# Patient Record
Sex: FEMALE | Race: BLACK OR AFRICAN AMERICAN | NOT HISPANIC OR LATINO | Employment: UNEMPLOYED | URBAN - METROPOLITAN AREA
[De-identification: names, ages, dates, MRNs, and addresses within clinical notes are randomized per-mention and may not be internally consistent; named-entity substitution may affect disease eponyms.]

---

## 2017-11-07 DIAGNOSIS — M25.561 RIGHT KNEE PAIN: ICD-10-CM

## 2017-11-07 DIAGNOSIS — M79.661 PAIN OF RIGHT LOWER LEG: Primary | ICD-10-CM

## 2017-11-13 ENCOUNTER — HOSPITAL ENCOUNTER (OUTPATIENT)
Dept: RADIOLOGY | Facility: HOSPITAL | Age: 72
Discharge: HOME | End: 2017-11-13
Attending: FAMILY MEDICINE
Payer: MEDICARE

## 2017-11-13 DIAGNOSIS — M79.661 PAIN OF RIGHT LOWER LEG: ICD-10-CM

## 2017-11-13 DIAGNOSIS — M25.561 RIGHT KNEE PAIN: ICD-10-CM

## 2017-11-13 PROCEDURE — 93971 EXTREMITY STUDY: CPT | Mod: 26,,, | Performed by: RADIOLOGY

## 2017-11-13 PROCEDURE — 93971 EXTREMITY STUDY: CPT | Mod: TC

## 2019-11-26 ENCOUNTER — TELEPHONE (OUTPATIENT)
Dept: ORTHOPEDICS | Facility: CLINIC | Age: 74
End: 2019-11-26

## 2019-11-26 NOTE — TELEPHONE ENCOUNTER
Spoke with pt    Rescheduled her appointment with Dr Lin to sports medicine.  Pt with right shoulder pain.  Pt will bring outside MRI.  Pt requesting MD only    
28-Oct-2018 09:12

## 2019-12-03 ENCOUNTER — HOSPITAL ENCOUNTER (OUTPATIENT)
Dept: RADIOLOGY | Facility: HOSPITAL | Age: 74
Discharge: HOME | End: 2019-12-03
Attending: ORTHOPAEDIC SURGERY
Payer: MEDICARE

## 2019-12-03 ENCOUNTER — OFFICE VISIT (OUTPATIENT)
Dept: SPORTS MEDICINE | Facility: CLINIC | Age: 74
End: 2019-12-03
Payer: MEDICARE

## 2019-12-03 VITALS
HEIGHT: 66 IN | BODY MASS INDEX: 22.5 KG/M2 | HEART RATE: 69 BPM | SYSTOLIC BLOOD PRESSURE: 128 MMHG | WEIGHT: 140 LBS | DIASTOLIC BLOOD PRESSURE: 83 MMHG

## 2019-12-03 DIAGNOSIS — M25.511 RIGHT SHOULDER PAIN, UNSPECIFIED CHRONICITY: ICD-10-CM

## 2019-12-03 DIAGNOSIS — M19.011 PRIMARY OSTEOARTHRITIS OF RIGHT SHOULDER: Primary | ICD-10-CM

## 2019-12-03 PROCEDURE — 99999 PR PBB SHADOW E&M-EST. PATIENT-LVL III: ICD-10-PCS | Mod: PBBFAC,,, | Performed by: ORTHOPAEDIC SURGERY

## 2019-12-03 PROCEDURE — 1101F PT FALLS ASSESS-DOCD LE1/YR: CPT | Mod: CPTII,S$GLB,, | Performed by: ORTHOPAEDIC SURGERY

## 2019-12-03 PROCEDURE — 1159F PR MEDICATION LIST DOCUMENTED IN MEDICAL RECORD: ICD-10-PCS | Mod: S$GLB,,, | Performed by: ORTHOPAEDIC SURGERY

## 2019-12-03 PROCEDURE — 99999 PR PBB SHADOW E&M-EST. PATIENT-LVL III: CPT | Mod: PBBFAC,,, | Performed by: ORTHOPAEDIC SURGERY

## 2019-12-03 PROCEDURE — 99203 PR OFFICE/OUTPT VISIT, NEW, LEVL III, 30-44 MIN: ICD-10-PCS | Mod: S$GLB,,, | Performed by: ORTHOPAEDIC SURGERY

## 2019-12-03 PROCEDURE — 1126F AMNT PAIN NOTED NONE PRSNT: CPT | Mod: S$GLB,,, | Performed by: ORTHOPAEDIC SURGERY

## 2019-12-03 PROCEDURE — 1101F PR PT FALLS ASSESS DOC 0-1 FALLS W/OUT INJ PAST YR: ICD-10-PCS | Mod: CPTII,S$GLB,, | Performed by: ORTHOPAEDIC SURGERY

## 2019-12-03 PROCEDURE — 1126F PR PAIN SEVERITY QUANTIFIED, NO PAIN PRESENT: ICD-10-PCS | Mod: S$GLB,,, | Performed by: ORTHOPAEDIC SURGERY

## 2019-12-03 PROCEDURE — 73030 X-RAY EXAM OF SHOULDER: CPT | Mod: TC,RT

## 2019-12-03 PROCEDURE — 1159F MED LIST DOCD IN RCRD: CPT | Mod: S$GLB,,, | Performed by: ORTHOPAEDIC SURGERY

## 2019-12-03 PROCEDURE — 73030 XR SHOULDER COMPLETE 2 OR MORE VIEWS RIGHT: ICD-10-PCS | Mod: 26,RT,, | Performed by: RADIOLOGY

## 2019-12-03 PROCEDURE — 99203 OFFICE O/P NEW LOW 30 MIN: CPT | Mod: S$GLB,,, | Performed by: ORTHOPAEDIC SURGERY

## 2019-12-03 PROCEDURE — 73030 X-RAY EXAM OF SHOULDER: CPT | Mod: 26,RT,, | Performed by: RADIOLOGY

## 2019-12-03 RX ORDER — AMLODIPINE BESYLATE 10 MG/1
10 TABLET ORAL NIGHTLY
Refills: 1 | COMMUNITY
Start: 2019-10-29

## 2019-12-03 RX ORDER — GABAPENTIN 300 MG/1
300 CAPSULE ORAL NIGHTLY PRN
Refills: 0 | COMMUNITY
Start: 2019-11-14

## 2019-12-03 RX ORDER — ACETAMINOPHEN AND CODEINE PHOSPHATE 300; 30 MG/1; MG/1
1 TABLET ORAL 2 TIMES DAILY PRN
Refills: 0 | COMMUNITY
Start: 2019-11-14 | End: 2022-12-05

## 2019-12-03 NOTE — LETTER
December 3, 2019      Raymond Delvalle MD  [address]  [place]  [facility] [address]           [facility]  [address]  [facility] [address]  Phone: [phone]          Patient: Elena Willingham   MR Number: [id number]   YOB: 1945   Date of Visit: 12/3/2019       Dear Dr. Raymond Delvalle:    Thank you for referring Elena Willingham to me for evaluation. Attached you will find relevant portions of my assessment and plan of care.    If you have questions, please do not hesitate to call me. I look forward to following Elena Willingham along with you.    Sincerely,    W Luca Woodson MD    Enclosure  CC:  No Recipients    If you would like to receive this communication electronically, please contact [email or web address] or [phone] to request more information on [employer] Link access.    For providers and/or their staff who would like to refer a patient to [facility], please contact us through our one-stop-shop provider referral line, [facility], at [phone].    If you feel you have received this communication in error or would no longer like to receive these types of communications, please e-mail [email or web address]

## 2019-12-03 NOTE — PROGRESS NOTES
"CC: Right shoulder pain     74 y.o. Female presents as a new patient to me. She is retired. RHD. Complaint is right shoulder pain for almost a year. Atraumatic onset. Pain localizes over the shoulder and radiates down the arm to the hand. Worse with activity and motion. Pain is disruptive to sleep at night. Better with rest. Denies neck pain. Treatment thus far has included activity modifications, rest, and oral medication.  She has also tried physical therapy with minimal relief.  She has not had any injections.  She has an MRI performed by her primary care doctor for her neck and her shoulder. Here today to discuss diagnosis and treatment options.     PMHx notable for HTN.   Negative for tobacco.   Negative for diabetes.     Pain Score: 0-No pain    PAST MEDICAL HISTORY:   History reviewed. No pertinent past medical history.    PAST SURGICAL HISTORY:  History reviewed. No pertinent surgical history.    FAMILY HISTORY:  History reviewed. No pertinent family history.    MEDICATIONS:    Current Outpatient Medications:     acetaminophen-codeine 300-30mg (TYLENOL #3) 300-30 mg Tab, Take 1 tablet by mouth 2 (two) times daily as needed. for pain, Disp: , Rfl: 0    amLODIPine (NORVASC) 10 MG tablet, Take 10 mg by mouth once daily., Disp: , Rfl: 1    gabapentin (NEURONTIN) 300 MG capsule, Take 300 mg by mouth 2 (two) times daily., Disp: , Rfl: 0    ALLERGIES:  Review of patient's allergies indicates:  No Known Allergies     REVIEW OF SYSTEMS:  Constitution: Negative. Negative for chills, fever and night sweats.    Hematologic/Lymphatic: Negative for bleeding problem. Does not bruise/bleed easily.   Skin: Negative for dry skin, itching and rash.   Musculoskeletal: Negative for falls. Positive for right shoulder pain and muscle weakness.     All other review of symptoms were reviewed and found to be noncontributory.     PHYSICAL EXAMINATION:  Vitals:  /83   Pulse 69   Ht 5' 6" (1.676 m)   Wt 63.5 kg (140 lb)   " BMI 22.60 kg/m²    General: Well-developed well-nourished 74 y.o. femalein no acute distress   Cardiovascular: Regular rhythm by palpation of distal pulse, normal color and temperature, no concerning varicosities on symptomatic side   Lungs: No labored breathing or wheezing appreciated   Neuro: Alert and oriented ×3   Psychiatric: well oriented to person, place and time, demonstrates normal mood and affect   Skin: No rashes, lesions or ulcers, normal temperature, turgor, and texture on uninvolved extremity    Ortho/SPM Exam  Examination of the right shoulder demonstrates no swelling.  Active forward elevation to 90° compared to 160° on the left side.  Passive forward elevation the right side also to 110°.  Active and passive external rotation to about 30°.  Pain and crepitus with range of motion. Internal rotation to the iliac crest.  4/-5 scaption strength.  4/5 external rotation strength.   Belly press negative.  Negative Spurling's exam.  No midline neck tenderness.    IMAGING:  Xrays including AP, Outlet and Axillary Lateral of RIGHT shoulder are ordered / images reviewed by me:   Advanced glenohumeral DJD with a large inferior goat's beard osteophyte.  Anterior humeral head subluxation.     MRI of RIGHT shoulder performed 9/26/2018: likely small partial thickness rotator cuff tear, advanced DJD noted    MRI of the cervical spine from the same date: multilevel degenerative disease most pronounced at the lower cervical segments with neural and foraminal stenosis.     ASSESSMENT:      ICD-10-CM ICD-9-CM   1. Primary osteoarthritis of right shoulder M19.011 715.11   2. Right shoulder pain, unspecified chronicity M25.511 719.41     Partial-thickness rotator cuff tearing on MRI    PLAN:     Findings were discussed with the patient. Treatment options reviewed both operative and non operative.  The patient is a candidate for an anatomic shoulder arthroplasty.  Would require intraoperative rotator cuff tissue assessment  and possible conversion to a reverse prosthesis. The patient has no interest in surgery. She also has no interest in an intra-articular injection at this time.  Possible return to clinic for that.  She has had prior physical therapy.  She will self-directed at this time. Return to clinic as needed.    Would need a preoperative CT scan prior to any shoulder arthroplasty.    Procedures

## 2020-05-13 ENCOUNTER — TELEPHONE (OUTPATIENT)
Dept: SPORTS MEDICINE | Facility: CLINIC | Age: 75
End: 2020-05-13

## 2020-05-13 NOTE — TELEPHONE ENCOUNTER
Scheduled patient for injection next Thursday.    ----- Message from Indiana Nunez MA sent at 5/13/2020 10:02 AM CDT -----  Contact: pt   Looks like pt saw Dr Woodson in December and note states pt was not interested in an injection at the time but would return to clinic if she decided to get one.  Nicolasa  ----- Message -----  From: Katie Hong  Sent: 5/13/2020   9:30 AM CDT  To: [facility]ANTO[facility] Staff    Type:  Needs Medical Advice    Who Called: Patient  Symptoms (please be specific): wants shot in shoulder  Would the patient rather a call back or a response via [facility]? Callback  Best Call Back Number: [phone]  Additional Information: pt wants to schedule an appointment to get injection in her shoulder

## 2020-05-21 ENCOUNTER — OFFICE VISIT (OUTPATIENT)
Dept: SPORTS MEDICINE | Facility: CLINIC | Age: 75
End: 2020-05-21
Payer: MEDICARE

## 2020-05-21 VITALS
WEIGHT: 140 LBS | HEIGHT: 66 IN | DIASTOLIC BLOOD PRESSURE: 87 MMHG | BODY MASS INDEX: 22.5 KG/M2 | HEART RATE: 82 BPM | SYSTOLIC BLOOD PRESSURE: 135 MMHG

## 2020-05-21 DIAGNOSIS — M19.011 PRIMARY OSTEOARTHRITIS OF RIGHT SHOULDER: Primary | ICD-10-CM

## 2020-05-21 PROCEDURE — 1159F PR MEDICATION LIST DOCUMENTED IN MEDICAL RECORD: ICD-10-PCS | Mod: S$GLB,,, | Performed by: PHYSICIAN ASSISTANT

## 2020-05-21 PROCEDURE — 1125F AMNT PAIN NOTED PAIN PRSNT: CPT | Mod: S$GLB,,, | Performed by: PHYSICIAN ASSISTANT

## 2020-05-21 PROCEDURE — 1101F PR PT FALLS ASSESS DOC 0-1 FALLS W/OUT INJ PAST YR: ICD-10-PCS | Mod: CPTII,S$GLB,, | Performed by: PHYSICIAN ASSISTANT

## 2020-05-21 PROCEDURE — 99999 PR PBB SHADOW E&M-EST. PATIENT-LVL III: ICD-10-PCS | Mod: PBBFAC,,, | Performed by: PHYSICIAN ASSISTANT

## 2020-05-21 PROCEDURE — 1159F MED LIST DOCD IN RCRD: CPT | Mod: S$GLB,,, | Performed by: PHYSICIAN ASSISTANT

## 2020-05-21 PROCEDURE — 20610 DRAIN/INJ JOINT/BURSA W/O US: CPT | Mod: RT,S$GLB,, | Performed by: PHYSICIAN ASSISTANT

## 2020-05-21 PROCEDURE — 99213 OFFICE O/P EST LOW 20 MIN: CPT | Mod: 25,S$GLB,, | Performed by: PHYSICIAN ASSISTANT

## 2020-05-21 PROCEDURE — 1101F PT FALLS ASSESS-DOCD LE1/YR: CPT | Mod: CPTII,S$GLB,, | Performed by: PHYSICIAN ASSISTANT

## 2020-05-21 PROCEDURE — 99213 PR OFFICE/OUTPT VISIT, EST, LEVL III, 20-29 MIN: ICD-10-PCS | Mod: 25,S$GLB,, | Performed by: PHYSICIAN ASSISTANT

## 2020-05-21 PROCEDURE — 99999 PR PBB SHADOW E&M-EST. PATIENT-LVL III: CPT | Mod: PBBFAC,,, | Performed by: PHYSICIAN ASSISTANT

## 2020-05-21 PROCEDURE — 1125F PR PAIN SEVERITY QUANTIFIED, PAIN PRESENT: ICD-10-PCS | Mod: S$GLB,,, | Performed by: PHYSICIAN ASSISTANT

## 2020-05-21 PROCEDURE — 20610 PR DRAIN/INJECT LARGE JOINT/BURSA: ICD-10-PCS | Mod: RT,S$GLB,, | Performed by: PHYSICIAN ASSISTANT

## 2020-05-21 RX ORDER — TRIAMCINOLONE ACETONIDE 40 MG/ML
40 INJECTION, SUSPENSION INTRA-ARTICULAR; INTRAMUSCULAR
Status: COMPLETED | OUTPATIENT
Start: 2020-05-21 | End: 2020-05-21

## 2020-05-21 RX ADMIN — TRIAMCINOLONE ACETONIDE 40 MG: 40 INJECTION, SUSPENSION INTRA-ARTICULAR; INTRAMUSCULAR at 08:05

## 2020-05-21 NOTE — PROGRESS NOTES
CC: Right shoulder pain     74 y.o. Female presents as a new patient to me. She is retired.  Following up for right shoulder pain for over 1 year now.  She was last seen by Dr. Woodson on 12/03/2019 where self-directed physical therapy was discussed.  She was not interested in injections or surgery at that time.  She returns to clinic today stating that the shoulder pain is described as constant, throbbing, 8/10.  She mentions the pain is located over her shoulder with occasional radiation down her arm to hand.  The pain is worse with any overhead motion or reaching across her body.  The pain is better with rest.  Pain does disrupt her sleep at night.  She denies neck pain, rather states if she is trying to use her shoulder a lot she will have occasional neck and trapezius stiffness.  Treatment thus far has included activity modifications, rest, and oral medication.  She has also tried physical therapy with minimal relief.  She has not had any injections.  She has an MRI performed by her primary care doctor for her neck and her shoulder. Here today to discuss diagnosis and treatment options.     PMHx notable for HTN.   Negative for tobacco.   Negative for diabetes.          PAST MEDICAL HISTORY:   No past medical history on file.    PAST SURGICAL HISTORY:  No past surgical history on file.    FAMILY HISTORY:  No family history on file.    MEDICATIONS:    Current Outpatient Medications:     acetaminophen-codeine 300-30mg (TYLENOL #3) 300-30 mg Tab, Take 1 tablet by mouth 2 (two) times daily as needed. for pain, Disp: , Rfl: 0    amLODIPine (NORVASC) 10 MG tablet, Take 10 mg by mouth once daily., Disp: , Rfl: 1    gabapentin (NEURONTIN) 300 MG capsule, Take 300 mg by mouth 2 (two) times daily., Disp: , Rfl: 0    ALLERGIES:  Review of patient's allergies indicates:  No Known Allergies     REVIEW OF SYSTEMS:  Constitution: Negative. Negative for chills, fever and night sweats.    Hematologic/Lymphatic: Negative for  bleeding problem. Does not bruise/bleed easily.   Skin: Negative for dry skin, itching and rash.   Musculoskeletal: Negative for falls. Positive for right shoulder pain and muscle weakness.     All other review of symptoms were reviewed and found to be noncontributory.     PHYSICAL EXAMINATION:  Vitals:  There were no vitals taken for this visit.   General: Well-developed well-nourished 74 y.o. femalein no acute distress   Cardiovascular: Regular rhythm by palpation of distal pulse, normal color and temperature, no concerning varicosities on symptomatic side   Lungs: No labored breathing or wheezing appreciated   Neuro: Alert and oriented ×3   Psychiatric: well oriented to person, place and time, demonstrates normal mood and affect   Skin: No rashes, lesions or ulcers, normal temperature, turgor, and texture on uninvolved extremity    Ortho/SPM Exam  Examination of the right shoulder demonstrates no swelling.  Active forward elevation to 95° compared to 150° on the left side.  Passive forward elevation the right side also to 100° with moderate to severe pain.  Active and passive external rotation to about 30°.  Pain and crepitus with range of motion. Internal rotation to the iliac crest.  4-/5 scaption strength.  4/5 external rotation strength.   Belly press negative.  Negative Spurling's exam.  No midline neck tenderness.    Previous IMAGING:  Xrays including AP, Outlet and Axillary Lateral of RIGHT shoulder are ordered / images reviewed by me:   Advanced glenohumeral DJD with a large inferior goat's beard osteophyte.  Anterior humeral head subluxation.     MRI of RIGHT shoulder performed 9/26/2018: likely small partial thickness rotator cuff tear, advanced DJD noted    MRI of the cervical spine from the same date: multilevel degenerative disease most pronounced at the lower cervical segments with neural and foraminal stenosis.     ASSESSMENT:      ICD-10-CM ICD-9-CM   1. Primary osteoarthritis of right shoulder  M19.011 715.11         PLAN:     1.  Discussed examination findings and imaging with patient in clinic today.  2.  Patient would like to proceed with cortisone injection and right shoulder as she is not interested in having surgery at all.  3.  Recommended Tylenol for pain as needed.  4.  Follow-up in 3 months.    Procedures  The injection site was identified and the skin was prepared with Chloraprep. The right shoulder was injected with 1 ml of Kenalog 40mg and 4 ml Ropivacaine under sterile technique. Elena Willingham tolerated the procedure well, she was advised to rest the right shoulder today, ice and support. she did receive immediate relief of the pain in and about her right shoulder. she was told this would be short lived and is secondary to the Ropivacaine. she may have an increase in her discomfort tonight followed by steady improvement over the next several days. It may take 1-3 weeks following the injection to get the full benefit of the medication.  I will see her back in 3 months. Sooner if she has any problems or concerns.

## 2020-08-11 DIAGNOSIS — M79.604 BILATERAL LEG AND FOOT PAIN: Primary | ICD-10-CM

## 2020-08-11 DIAGNOSIS — M79.671 BILATERAL LEG AND FOOT PAIN: Primary | ICD-10-CM

## 2020-08-11 DIAGNOSIS — M79.605 BILATERAL LEG AND FOOT PAIN: Primary | ICD-10-CM

## 2020-08-11 DIAGNOSIS — M79.672 BILATERAL LEG AND FOOT PAIN: Primary | ICD-10-CM

## 2020-08-13 ENCOUNTER — HOSPITAL ENCOUNTER (OUTPATIENT)
Dept: RADIOLOGY | Facility: OTHER | Age: 75
Discharge: HOME | End: 2020-08-13
Attending: SURGERY
Payer: MEDICARE

## 2020-08-13 DIAGNOSIS — M79.605 BILATERAL LEG AND FOOT PAIN: ICD-10-CM

## 2020-08-13 DIAGNOSIS — M79.672 BILATERAL LEG AND FOOT PAIN: ICD-10-CM

## 2020-08-13 DIAGNOSIS — M79.604 BILATERAL LEG AND FOOT PAIN: ICD-10-CM

## 2020-08-13 DIAGNOSIS — M79.671 BILATERAL LEG AND FOOT PAIN: ICD-10-CM

## 2020-08-13 PROCEDURE — 93970 EXTREMITY STUDY: CPT | Mod: TC

## 2020-08-13 PROCEDURE — 93970 EXTREMITY STUDY: CPT | Mod: 26,,, | Performed by: RADIOLOGY

## 2020-08-13 PROCEDURE — 93970 US LOWER EXTREMITY VEINS BILATERAL INSUFFICIENCY: ICD-10-PCS | Mod: 26,,, | Performed by: RADIOLOGY

## 2020-08-14 ENCOUNTER — INITIAL CONSULT (OUTPATIENT)
Dept: VASCULAR SURGERY | Facility: CLINIC | Age: 75
End: 2020-08-14
Payer: MEDICARE

## 2020-08-14 VITALS
HEIGHT: 66 IN | SYSTOLIC BLOOD PRESSURE: 139 MMHG | WEIGHT: 138.88 LBS | BODY MASS INDEX: 22.32 KG/M2 | HEART RATE: 111 BPM | DIASTOLIC BLOOD PRESSURE: 85 MMHG

## 2020-08-14 DIAGNOSIS — R20.2 RIGHT LEG PARESTHESIAS: ICD-10-CM

## 2020-08-14 DIAGNOSIS — I73.9 ASYMPTOMATIC PVD (PERIPHERAL VASCULAR DISEASE): Primary | ICD-10-CM

## 2020-08-14 PROCEDURE — 1101F PR PT FALLS ASSESS DOC 0-1 FALLS W/OUT INJ PAST YR: ICD-10-PCS | Mod: CPTII,S$GLB,, | Performed by: SURGERY

## 2020-08-14 PROCEDURE — 1101F PT FALLS ASSESS-DOCD LE1/YR: CPT | Mod: CPTII,S$GLB,, | Performed by: SURGERY

## 2020-08-14 PROCEDURE — 1126F PR PAIN SEVERITY QUANTIFIED, NO PAIN PRESENT: ICD-10-PCS | Mod: S$GLB,,, | Performed by: SURGERY

## 2020-08-14 PROCEDURE — 99204 PR OFFICE/OUTPT VISIT, NEW, LEVL IV, 45-59 MIN: ICD-10-PCS | Mod: S$GLB,,, | Performed by: SURGERY

## 2020-08-14 PROCEDURE — 1126F AMNT PAIN NOTED NONE PRSNT: CPT | Mod: S$GLB,,, | Performed by: SURGERY

## 2020-08-14 PROCEDURE — 1159F PR MEDICATION LIST DOCUMENTED IN MEDICAL RECORD: ICD-10-PCS | Mod: S$GLB,,, | Performed by: SURGERY

## 2020-08-14 PROCEDURE — 99204 OFFICE O/P NEW MOD 45 MIN: CPT | Mod: S$GLB,,, | Performed by: SURGERY

## 2020-08-14 PROCEDURE — 1159F MED LIST DOCD IN RCRD: CPT | Mod: S$GLB,,, | Performed by: SURGERY

## 2020-08-14 NOTE — LETTER
August 14, 2020      Raymond Delvalle MD  [address]  [facility] [address]           [facility] [address]  [address] [address]  Phone: [phone]  Fax: [phone]          Patient: Elena Willingham   MR Number: [id number]   YOB: 1945   Date of Visit: 8/14/2020       Dear Dr. Raymond Delvalle:    Thank you for referring Elena Willingham to me for evaluation. Attached you will find relevant portions of my assessment and plan of care.    If you have questions, please do not hesitate to call me. I look forward to following Elena Willingham along with you.    Sincerely,    Marky Lowe MD    Enclosure  CC:  No Recipients    If you would like to receive this communication electronically, please contact [email or web address] or [phone] to request more information on [employer] Link access.    For providers and/or their staff who would like to refer a patient to [facility], please contact us through our one-stop-shop provider referral line, [facility], at [phone].    If you feel you have received this communication in error or would no longer like to receive these types of communications, please e-mail [email or web address]

## 2020-08-14 NOTE — PROGRESS NOTES
Marky Lowe MD, RPVI                                 [facility]                           [facility]                             08/14/2020    HPI:  Elena Willingham is a 75 y.o. female with There is no problem list on file for this patient.   being managed by PCP and specialists who is here today for evaluation of PVD.  Pt has no c/o claudication, rest pain or wounds.  She does have c/o RLE muscle spasms and a h/o RLE paresthesias due to sciatica s/p injections.  Patient states location is RLE occurring for years.  Associated signs and symptoms include spider veins, no edema.  Quality is tingling and severity is 5/10.  Symptoms began yrs ago.  Alleviating factors include walking.  Worsening factors include sleeping.  Patient is not wearing compression stockings daily.  No FH of venous disease.  Symptoms do not limit patient's functional status and daily activities.  No DVT history.  No venous interventions.  No low sodium diet.  No excessive water intake.    Migraine with aura: no  PFO/ASD/right to left shunt: no  Pregnant: no  Breastfeeding: no    no MI  no Stroke  Tobacco use: no    History reviewed. No pertinent past medical history.  History reviewed. No pertinent surgical history.  History reviewed. No pertinent family history.  Social History     Socioeconomic History    Marital status:      Spouse name: Not on file    Number of children: Not on file    Years of education: Not on file    Highest education level: Not on file   Occupational History    Not on file   Social Needs    Financial resource strain: Not on file    Food insecurity     Worry: Not on file     Inability: Not on file    Transportation needs     Medical: Not on file     Non-medical: Not on file   Tobacco Use    Smoking status: Former Smoker    Smokeless tobacco: Former User   Substance and Sexual Activity    Alcohol use: Not on file    Drug use: Not on file    Sexual activity: Not  on file   Lifestyle    Physical activity     Days per week: Not on file     Minutes per session: Not on file    Stress: Not on file   Relationships    Social connections     Talks on phone: Not on file     Gets together: Not on file     Attends Islam service: Not on file     Active member of club or organization: Not on file     Attends meetings of clubs or organizations: Not on file     Relationship status: Not on file   Other Topics Concern    Not on file   Social History Narrative    Not on file       Current Outpatient Medications:     acetaminophen-codeine 300-30mg (TYLENOL #3) 300-30 mg Tab, Take 1 tablet by mouth 2 (two) times daily as needed. for pain, Disp: , Rfl: 0    amLODIPine (NORVASC) 10 MG tablet, Take 10 mg by mouth once daily., Disp: , Rfl: 1    gabapentin (NEURONTIN) 300 MG capsule, Take 300 mg by mouth 2 (two) times daily., Disp: , Rfl: 0    REVIEW OF SYSTEMS:  General: No fevers or chills; ENT: No sore throat; Allergy and Immunology: no persistent infections; Hematological and Lymphatic: No history of bleeding or easy bruising; Endocrine: negative; Respiratory: no cough, shortness of breath, or wheezing; Cardiovascular: no chest pain or dyspnea on exertion; Gastrointestinal: no abdominal pain/back, change in bowel habits, or bloody stools; Genito-Urinary: no dysuria, trouble voiding, or hematuria; Musculoskeletal: no edema; Neurological: no TIA or stroke symptoms; Psychiatric: no nervousness, anxiety or depression.    PHYSICAL EXAM:      Pulse: (!) 111         General appearance:  Alert, well-appearing, and in no distress.  Oriented to person, place, and time                    Neurological: Normal speech, no focal findings noted; CN II - XII grossly intact. RLE with sensation to light touch, LLE with sensation to light touch.            Musculoskeletal: Digits/nail without cyanosis/clubbing.  Strength 5/5 BLE.                    Neck: Supple, no significant adenopathy                   Chest:  No wheezes, symmetric air entry. No use of accessory muscles               Cardiac: Normal rate and regular rhythm            Abdomen: Soft, nontender, nondistended      Extremities:   2+ R DP pulse, 2+ L DP pulse      1+ RLE edema, 1+ LLE edema    Skin:  RLE no ulcer; LLE no ulcer      RLE + spider veins, LLE + spider veins      RLE no varicose veins, LLE no varicose veins    CEAP 3/3    LAB RESULTS:  No results found for: CBC  No results found for: LABPROT, INR  No results found for: NA, K, CL, CO2, GLU, BUN, CREATININE, CALCIUM, ANIONGAP, EGFRNONAA  No results found for: WBC, RBC, HGB, HCT, MCV, MCH, MCHC, RDW, PLT, MPV, GRAN, LYMPH, MONO, EOS, BASO, GRAN, EOSINOPHIL, BASOPHIL, DIFFMETHOD  .No results found for: HGBA1C    IMAGING:  All pertinent imaging has been reviewed and interpreted independently.    Venous US 8/2020 Impression: L GSV reflux, no DVT    IMP/PLAN:  75 y.o. female with There is no problem list on file for this patient.   being managed by PCP and specialists who is here today for evaluation of PVD.    -Asymptomatic PVD - rec medical mgmt with daily ASA, heart healthy lifestyle  -If pt develops BLE edema - recommend compression with Rx stockings, elevation, dietary changes associated with water and sodium intake discussed at length with patient  -Exercise   -Neuro referral for RLE MSK vs neurogenic pain  -RTC 6 months for further evaluation with JANENE    I spent 14 minutes evaluating this patient and greater than 50% of the time was spent counseling, coordinator care and discussing the plan of care.  All questions were answered and patient stated understanding with agreement with the above treatment plan.    Marky Lowe MD [facility]  Vascular and Endovascular Surgery

## 2020-08-14 NOTE — PATIENT INSTRUCTIONS
Peripheral Artery Disease (PAD)     Peripheral artery disease (PAD) occurs when the arteries that carry blood to the limbs are narrowed or blocked. This is usually due to a buildup of a fatty substance called plaque in the walls of the arteries.  PAD most often affects the arteries in the legs. When these arteries are narrowed or blocked, blood flow to the legs is reduced. This can cause leg and foot pain and other symptoms. If severe enough, reduced blood flow to the legs can lead to tissue death (gangrene) and the loss of a toe, foot, or leg. Having PAD also makes it more likely that arteries in other body areas are blocked. For instance, arteries that carry blood to the heart or brain may be affected. This raises the chances of heart attack and stroke.  Risk factors  Certain factors can make PAD more likely. They include:  · Smoking  · Diabetes  · High blood pressure  · Unhealthy cholesterol levels  · Obesity  · Inactive lifestyle  · Older age  · Family history of PAD  Symptoms  Many people with PAD have no symptoms. If symptoms do occur, they can include:  · Pain in the muscles of the calves, thighs or hips that gets worse with activity and better with rest (intermittent claudication)  · Achy, tired, or heavy feeling in the legs  · Weakness, numbness, tingling, or loss of feeling in the legs  · Changes in skin color of the legs  · Sores on the legs and feet  · Cold leg, feet, or toes  · Pain the feet or toes even when lying down (rest pain)  Home care  PAD is a chronic (lifelong) condition. Treatment is focused on managing your condition and lowering your health risks. This may include doing the following:  · If you smoke, quit. This helps prevent further damage to your arteries and lowers your health risks. Ask your provider about medicines or products that can help you quit smoking. Also consider joining a stop-smoking program or support group.  · Be more active. This helps you lose weight and manage  problems such as high blood pressure and unhealthy cholesterol levels. Start a walking program if advised to by your provider. Your provider may also help you form a safe exercise program that is right for your needs.  · Make healthy eating changes. This includes eating less fat, salt, and sugar.  · Take medicines for high blood pressure, unhealthy cholesterol levels, and diabetes as directed.  · Have your blood pressure and cholesterol levels checked as often as directed.  · If you have diabetes, try to keep your blood sugar well controlled. Test your blood sugar as directed.  · If you are overweight, talk to your provider about forming a weight-loss plan.  · Watch for cuts, scrapes, or open sores on your feet. Poor blood flow to the feet may slow healing and increase the risk of infection from these problems.   Follow-up care  Follow up with your healthcare provider, or as advised. If imaging tests such as ultrasound were done, they will be reviewed by a doctor. You will be told the results and any new findings that may affect your care.  When to seek medical advice   Call your healthcare provider right away if any of these occur:  · Sudden severe pain in the legs or feet  · Sudden cold, pale or blue color in the legs or feet  · Weakness or numbness in the legs or feet that worsens  · Any sore or wound in the legs or feet that wont heal  · Weak pulse in your legs or feet  Know the Signs of Heart Attack and Stroke  People with PAD are at high risk for heart attack and stroke. Knowing the signs of these problems can help you protect your health and get help when you need it. Call 911 right away if you have any of the following:  Signs of a Heart Attack  · Chest discomfort, such as pain, aching, tightness, or pressure that lasts more than a few minutes, or that comes and goes  · Pain or discomfort in the arms, back, shoulders, neck, or jaw  · Shortness of breath  · Sweating (often a cold, clammy  sweat)  · Nausea  · Lightheadedness  Signs of a Stroke  · Sudden numbness or weakness of the face, arms, or legs, especially on one side  · Sudden confusion or trouble speaking or understanding  · Sudden trouble seeing in one or both eyes  · Sudden trouble walking, dizziness, or loss of balance  · Sudden, severe headache with no known cause   Date Last Reviewed: 9/21/2015  © 4065-2823 [employer]. [address] [place] [address], [place] [id number]. All rights reserved. This information is not intended as a substitute for professional medical care. Always follow your healthcare professional's instructions.

## 2020-08-17 DIAGNOSIS — M79.672 BILATERAL LEG AND FOOT PAIN: Primary | ICD-10-CM

## 2020-08-17 DIAGNOSIS — M79.604 BILATERAL LEG AND FOOT PAIN: Primary | ICD-10-CM

## 2020-08-17 DIAGNOSIS — M79.671 BILATERAL LEG AND FOOT PAIN: Primary | ICD-10-CM

## 2020-08-17 DIAGNOSIS — M79.605 BILATERAL LEG AND FOOT PAIN: Primary | ICD-10-CM

## 2020-08-20 ENCOUNTER — OFFICE VISIT (OUTPATIENT)
Dept: SPORTS MEDICINE | Facility: CLINIC | Age: 75
End: 2020-08-20
Payer: MEDICARE

## 2020-08-20 VITALS
WEIGHT: 155 LBS | BODY MASS INDEX: 24.91 KG/M2 | HEART RATE: 76 BPM | DIASTOLIC BLOOD PRESSURE: 82 MMHG | HEIGHT: 66 IN | SYSTOLIC BLOOD PRESSURE: 129 MMHG

## 2020-08-20 DIAGNOSIS — M19.011 PRIMARY OSTEOARTHRITIS OF RIGHT SHOULDER: Primary | ICD-10-CM

## 2020-08-20 DIAGNOSIS — M25.511 RIGHT SHOULDER PAIN, UNSPECIFIED CHRONICITY: ICD-10-CM

## 2020-08-20 PROCEDURE — 1159F MED LIST DOCD IN RCRD: CPT | Mod: S$GLB,,, | Performed by: PHYSICIAN ASSISTANT

## 2020-08-20 PROCEDURE — 1125F AMNT PAIN NOTED PAIN PRSNT: CPT | Mod: S$GLB,,, | Performed by: PHYSICIAN ASSISTANT

## 2020-08-20 PROCEDURE — 99213 OFFICE O/P EST LOW 20 MIN: CPT | Mod: S$GLB,,, | Performed by: PHYSICIAN ASSISTANT

## 2020-08-20 PROCEDURE — 1159F PR MEDICATION LIST DOCUMENTED IN MEDICAL RECORD: ICD-10-PCS | Mod: S$GLB,,, | Performed by: PHYSICIAN ASSISTANT

## 2020-08-20 PROCEDURE — 99999 PR PBB SHADOW E&M-EST. PATIENT-LVL III: ICD-10-PCS | Mod: PBBFAC,,, | Performed by: PHYSICIAN ASSISTANT

## 2020-08-20 PROCEDURE — 99213 PR OFFICE/OUTPT VISIT, EST, LEVL III, 20-29 MIN: ICD-10-PCS | Mod: S$GLB,,, | Performed by: PHYSICIAN ASSISTANT

## 2020-08-20 PROCEDURE — 99999 PR PBB SHADOW E&M-EST. PATIENT-LVL III: CPT | Mod: PBBFAC,,, | Performed by: PHYSICIAN ASSISTANT

## 2020-08-20 PROCEDURE — 1101F PR PT FALLS ASSESS DOC 0-1 FALLS W/OUT INJ PAST YR: ICD-10-PCS | Mod: CPTII,S$GLB,, | Performed by: PHYSICIAN ASSISTANT

## 2020-08-20 PROCEDURE — 1125F PR PAIN SEVERITY QUANTIFIED, PAIN PRESENT: ICD-10-PCS | Mod: S$GLB,,, | Performed by: PHYSICIAN ASSISTANT

## 2020-08-20 PROCEDURE — 1101F PT FALLS ASSESS-DOCD LE1/YR: CPT | Mod: CPTII,S$GLB,, | Performed by: PHYSICIAN ASSISTANT

## 2020-08-20 NOTE — PROGRESS NOTES
"CC: Right shoulder pain     75 y.o. Female presents as an established patient following up for right shoulder pain.  She was last seen by me in May of 2020 for a right shoulder subacromial cortisone injection.  She presents to clinic today stating that her shoulder feels noticeably better after this injection.  She does still have some soreness/sharp pains occasionally to the biceps region of her arm.  She states his pain is only noticed with certain motions.  Pain is rated at 4/10 when present, pain does not radiate to wrist or hand.  Overall she is very happy with the results of her injection.    PMHx notable for HTN.   Negative for tobacco.   Negative for diabetes.     Pain Score:   4    PAST MEDICAL HISTORY:   History reviewed. No pertinent past medical history.    PAST SURGICAL HISTORY:  History reviewed. No pertinent surgical history.    FAMILY HISTORY:  History reviewed. No pertinent family history.    MEDICATIONS:    Current Outpatient Medications:     acetaminophen-codeine 300-30mg (TYLENOL #3) 300-30 mg Tab, Take 1 tablet by mouth 2 (two) times daily as needed. for pain, Disp: , Rfl: 0    amLODIPine (NORVASC) 10 MG tablet, Take 10 mg by mouth once daily., Disp: , Rfl: 1    gabapentin (NEURONTIN) 300 MG capsule, Take 300 mg by mouth 2 (two) times daily., Disp: , Rfl: 0    ALLERGIES:  Review of patient's allergies indicates:  No Known Allergies     REVIEW OF SYSTEMS:  Constitution: Negative. Negative for chills, fever and night sweats.    Hematologic/Lymphatic: Negative for bleeding problem. Does not bruise/bleed easily.   Skin: Negative for dry skin, itching and rash.   Musculoskeletal: Negative for falls. Positive for right shoulder pain and muscle weakness.     All other review of symptoms were reviewed and found to be noncontributory.     PHYSICAL EXAMINATION:  Vitals:  /82   Pulse 76   Ht 5' 6" (1.676 m)   Wt 70.3 kg (155 lb)   BMI 25.02 kg/m²    General: Well-developed well-nourished 75 " y.o. femalein no acute distress   Cardiovascular: Regular rhythm by palpation of distal pulse, normal color and temperature, no concerning varicosities on symptomatic side   Lungs: No labored breathing or wheezing appreciated   Neuro: Alert and oriented ×3   Psychiatric: well oriented to person, place and time, demonstrates normal mood and affect   Skin: No rashes, lesions or ulcers, normal temperature, turgor, and texture on uninvolved extremity    Ortho/SPM Exam  Examination of the right shoulder demonstrates no swelling.  Active forward elevation to 140° compared to 150° on the left side.  Passive forward elevation the right side also to 145° with mild discomfort to biceps region or shoulder.  Active and passive external rotation to about 30°.  Slight Pain and crepitus with range of motion. Internal rotation to the iliac crest.  4+/5 scaption strength.  4/5 external rotation strength.   Belly press negative.  Negative Spurling's exam.  No midline neck tenderness.    Previous IMAGING:  Xrays including AP, Outlet and Axillary Lateral of RIGHT shoulder are ordered / images reviewed by me:   Advanced glenohumeral DJD with a large inferior goat's beard osteophyte.  Anterior humeral head subluxation.     MRI of RIGHT shoulder performed 9/26/2018: likely small partial thickness rotator cuff tear, advanced DJD noted    MRI of the cervical spine from the same date: multilevel degenerative disease most pronounced at the lower cervical segments with neural and foraminal stenosis.     ASSESSMENT:      ICD-10-CM ICD-9-CM   1. Primary osteoarthritis of right shoulder  M19.011 715.11   2. Right shoulder pain, unspecified chronicity  M25.511 719.41         PLAN:     1.  Discussed examination findings and imaging with patient in clinic today.  2.  Patient received shoulder conditioning program in clinic today.  3.  Due to satisfaction with relief of symptoms at this time, I would did not recommend any further medical treatment  until her shoulder became symptomatic again.  4.  She did has slight tenderness palpation over the proximal biceps tendon, I advised that if this was to worsen or continue to bother her over the next 4-6 weeks after doing some therapeutic exercises that she can return for a biceps tendon injection.  5.  Follow-up as needed for right shoulder pain.

## 2020-08-26 DIAGNOSIS — M79.671 BILATERAL LEG AND FOOT PAIN: Primary | ICD-10-CM

## 2020-08-26 DIAGNOSIS — M79.672 BILATERAL LEG AND FOOT PAIN: Primary | ICD-10-CM

## 2020-08-26 DIAGNOSIS — M79.605 BILATERAL LEG AND FOOT PAIN: Primary | ICD-10-CM

## 2020-08-26 DIAGNOSIS — M79.604 BILATERAL LEG AND FOOT PAIN: Primary | ICD-10-CM

## 2020-10-13 ENCOUNTER — LAB VISIT (OUTPATIENT)
Dept: LAB | Facility: HOSPITAL | Age: 75
End: 2020-10-13
Attending: INTERNAL MEDICINE
Payer: MEDICARE

## 2020-10-13 ENCOUNTER — OFFICE VISIT (OUTPATIENT)
Dept: RHEUMATOLOGY | Facility: CLINIC | Age: 75
End: 2020-10-13
Payer: MEDICARE

## 2020-10-13 VITALS
BODY MASS INDEX: 26.65 KG/M2 | HEIGHT: 66 IN | WEIGHT: 165.81 LBS | DIASTOLIC BLOOD PRESSURE: 83 MMHG | SYSTOLIC BLOOD PRESSURE: 123 MMHG | HEART RATE: 105 BPM

## 2020-10-13 DIAGNOSIS — M25.50 POLYARTHRALGIA: Primary | ICD-10-CM

## 2020-10-13 DIAGNOSIS — M19.049 HAND ARTHROPATHY: Primary | ICD-10-CM

## 2020-10-13 DIAGNOSIS — M25.50 POLYARTHRALGIA: ICD-10-CM

## 2020-10-13 LAB
CCP AB SER IA-ACNC: <0.5 U/ML
CRP SERPL-MCNC: 0.8 MG/L (ref 0–8.2)
ERYTHROCYTE [SEDIMENTATION RATE] IN BLOOD BY WESTERGREN METHOD: 13 MM/HR (ref 0–36)
RHEUMATOID FACT SERPL-ACNC: <10 IU/ML (ref 0–15)

## 2020-10-13 PROCEDURE — 99205 PR OFFICE/OUTPT VISIT, NEW, LEVL V, 60-74 MIN: ICD-10-PCS | Mod: S$GLB,,, | Performed by: INTERNAL MEDICINE

## 2020-10-13 PROCEDURE — 86431 RHEUMATOID FACTOR QUANT: CPT

## 2020-10-13 PROCEDURE — 85652 RBC SED RATE AUTOMATED: CPT

## 2020-10-13 PROCEDURE — 1101F PR PT FALLS ASSESS DOC 0-1 FALLS W/OUT INJ PAST YR: ICD-10-PCS | Mod: CPTII,S$GLB,, | Performed by: INTERNAL MEDICINE

## 2020-10-13 PROCEDURE — 99999 PR PBB SHADOW E&M-EST. PATIENT-LVL III: ICD-10-PCS | Mod: PBBFAC,,, | Performed by: INTERNAL MEDICINE

## 2020-10-13 PROCEDURE — 86039 ANTINUCLEAR ANTIBODIES (ANA): CPT

## 2020-10-13 PROCEDURE — 1159F PR MEDICATION LIST DOCUMENTED IN MEDICAL RECORD: ICD-10-PCS | Mod: S$GLB,,, | Performed by: INTERNAL MEDICINE

## 2020-10-13 PROCEDURE — 99999 PR PBB SHADOW E&M-EST. PATIENT-LVL III: CPT | Mod: PBBFAC,,, | Performed by: INTERNAL MEDICINE

## 2020-10-13 PROCEDURE — 86140 C-REACTIVE PROTEIN: CPT

## 2020-10-13 PROCEDURE — 86200 CCP ANTIBODY: CPT

## 2020-10-13 PROCEDURE — 86038 ANTINUCLEAR ANTIBODIES: CPT

## 2020-10-13 PROCEDURE — 86235 NUCLEAR ANTIGEN ANTIBODY: CPT | Mod: 59

## 2020-10-13 PROCEDURE — 1101F PT FALLS ASSESS-DOCD LE1/YR: CPT | Mod: CPTII,S$GLB,, | Performed by: INTERNAL MEDICINE

## 2020-10-13 PROCEDURE — 1159F MED LIST DOCD IN RCRD: CPT | Mod: S$GLB,,, | Performed by: INTERNAL MEDICINE

## 2020-10-13 PROCEDURE — 36415 COLL VENOUS BLD VENIPUNCTURE: CPT

## 2020-10-13 PROCEDURE — 99205 OFFICE O/P NEW HI 60 MIN: CPT | Mod: S$GLB,,, | Performed by: INTERNAL MEDICINE

## 2020-10-13 RX ORDER — DORZOLAMIDE HYDROCHLORIDE AND TIMOLOL MALEATE 20; 5 MG/ML; MG/ML
SOLUTION/ DROPS OPHTHALMIC
COMMUNITY
Start: 2020-07-24

## 2020-10-13 RX ORDER — DICLOFENAC SODIUM 10 MG/G
2 GEL TOPICAL 4 TIMES DAILY PRN
Qty: 1 TUBE | Refills: 3 | Status: SHIPPED | OUTPATIENT
Start: 2020-10-13 | End: 2021-10-18

## 2020-10-13 RX ORDER — BENZONATATE 100 MG/1
CAPSULE ORAL
COMMUNITY
Start: 2020-09-14 | End: 2022-12-05

## 2020-10-13 RX ORDER — A/SINGAPORE/GP1908/2015 IVR-180 (AN A/MICHIGAN/45/2015 (H1N1)PDM09-LIKE VIRUS, A/HONG KONG/4801/2014, NYMC X-263B (H3N2) (AN A/HONG KONG/4801/2014-LIKE VIRUS), AND B/BRISBANE/60/2008, WILD TYPE (A B/BRISBANE/60/2008-LIKE VIRUS) 15; 15; 15 UG/.5ML; UG/.5ML; UG/.5ML
INJECTION, SUSPENSION INTRAMUSCULAR
COMMUNITY
Start: 2020-09-11 | End: 2022-12-05 | Stop reason: ALTCHOICE

## 2020-10-13 RX ORDER — ESOMEPRAZOLE MAGNESIUM 40 MG/1
CAPSULE, DELAYED RELEASE ORAL
COMMUNITY
Start: 2020-09-19

## 2020-10-13 RX ORDER — TRAMADOL HYDROCHLORIDE 50 MG/1
50 TABLET ORAL EVERY 12 HOURS PRN
Qty: 60 TABLET | Refills: 5 | Status: SHIPPED | OUTPATIENT
Start: 2020-10-13 | End: 2022-12-05

## 2020-10-13 NOTE — PROGRESS NOTES
"Subjective:       Patient ID: Elena Willingham is a 75 y.o. female.    Chief Complaint: Disease Management    HPI     75 year old F with PMH of HTN here for evaluation. She has pain in right shoulder. She has pain in neck that radiates down the arm.  Pain is constant. Raising arm makes pain worse.   On occasion, she gets spasms in neck and in right leg.   Denies any joint swelling or stiffness.  Pain level can be as high as 8/10, aching and sometimes radiates from neck down the right arm.  Denies oral ulcers, fevers, rashes, photosensitivity, or sob.    No past medical history on file.    Review of Systems   Constitutional: Negative for activity change, appetite change, chills, diaphoresis, fatigue, fever and unexpected weight change.   HENT: Negative for congestion, ear discharge, ear pain, facial swelling, mouth sores, sinus pressure, sneezing, sore throat, tinnitus and trouble swallowing.    Eyes: Negative for photophobia, pain, discharge, redness, itching and visual disturbance.   Respiratory: Negative for apnea, chest tightness, shortness of breath, wheezing and stridor.    Cardiovascular: Negative for leg swelling.   Gastrointestinal: Negative for abdominal distention, abdominal pain, anal bleeding, blood in stool, constipation, diarrhea and nausea.   Endocrine: Negative for cold intolerance and heat intolerance.   Genitourinary: Negative for difficulty urinating and dysuria.   Musculoskeletal: Positive for arthralgias. Negative for back pain, gait problem, joint swelling, myalgias, neck pain and neck stiffness.   Skin: Negative for color change, pallor, rash and wound.   Neurological: Negative for dizziness, seizures, light-headedness and numbness.   Hematological: Negative for adenopathy. Does not bruise/bleed easily.   Psychiatric/Behavioral: Negative for sleep disturbance. The patient is not nervous/anxious.              Objective:   /83   Pulse 105   Ht 5' 6" (1.676 m)   Wt 75.2 kg (165 lb " 12.6 oz)   BMI 26.76 kg/m²      Physical Exam   Constitutional: She is oriented to person, place, and time.   HENT:   Head: Normocephalic and atraumatic.   Right Ear: External ear normal.   Left Ear: External ear normal.   Nose: Nose normal.   Mouth/Throat: Oropharynx is clear and moist. No oropharyngeal exudate.   Eyes: Conjunctivae and EOM are normal. Pupils are equal, round, and reactive to light. Right eye exhibits no discharge. Left eye exhibits no discharge. No scleral icterus.   Neck: Neck supple. No JVD present. No thyromegaly present.   Cardiovascular: Normal rate, regular rhythm, normal heart sounds and intact distal pulses.  Exam reveals no gallop and no friction rub.    No murmur heard.  Pulmonary/Chest: Effort normal and breath sounds normal. No respiratory distress. She has no wheezes. She has no rales. She exhibits no tenderness.   Abdominal: Soft. Bowel sounds are normal. She exhibits no distension and no mass. There is no abdominal tenderness. There is no rebound and no guarding.   Lymphadenopathy:     She has no cervical adenopathy.   Neurological: She is alert and oriented to person, place, and time. No cranial nerve deficit. Gait normal. Coordination normal.   Skin: Skin is dry. No rash noted. No erythema. No pallor.     Psychiatric: Affect and judgment normal.   Musculoskeletal: Tenderness present. No deformity or edema.      Comments: Pain with abduction to 120 degrees            Labs: reviewed      Right shoulder xray (2020): I personally reviewed;FINDINGS:  Right shoulder: No fracture dislocation bone destruction seen.  There is advanced DJD of the glenohumeral joint with a large inferior osteophyte suggesting H ADD arthropathy as well.          No data to display     Assessment:     75 year old F with PMH of HTN here for evaluation of joint pain.  She has pain in right arm and has known DJD in right shoulder.  I will work her up for inflammatory arthritis but have low suspicion.  She has  taken tramadol in the past with improvement so will prescribe it.    No diagnosis found.        Plan:       Problem List Items Addressed This Visit     None      labs  xrays  Start tylenol arthritis in AM  Start tramadol 50mg po qhs prn  Start voltaren gel QID PRN  *

## 2020-10-13 NOTE — LETTER
October 13, 2020      Raymond Delvalle MD  [address]  [address]  [facility] [address]           [id number]  [address]  [facility] [address]  Phone: [phone]  Fax: [phone]          Patient: Elena Willingham   MR Number: [id number]   YOB: 1945   Date of Visit: 10/13/2020       Dear Dr. Raymond Delvalle:    Thank you for referring Elena Willingham to me for evaluation. Attached you will find relevant portions of my assessment and plan of care.    If you have questions, please do not hesitate to call me. I look forward to following Elena Willingham along with you.    Sincerely,    Marsha Oro MD    Enclosure  CC:  No Recipients    If you would like to receive this communication electronically, please contact [email or web address] or [phone] to request more information on [employer] Link access.    For providers and/or their staff who would like to refer a patient to [facility], please contact us through our one-stop-shop provider referral line, [facility], at [phone].    If you feel you have received this communication in error or would no longer like to receive these types of communications, please e-mail [email or web address]

## 2020-10-15 LAB
ANA PATTERN 1: NORMAL
ANA SER QL IF: POSITIVE
ANA TITR SER IF: NORMAL {TITER}

## 2020-10-16 LAB
ANTI SM ANTIBODY: 0.06 RATIO (ref 0–0.99)
ANTI SM/RNP ANTIBODY: 0.07 RATIO (ref 0–0.99)
ANTI-SM INTERPRETATION: NEGATIVE
ANTI-SM/RNP INTERPRETATION: NEGATIVE
ANTI-SSA ANTIBODY: 0.05 RATIO (ref 0–0.99)
ANTI-SSA INTERPRETATION: NEGATIVE
ANTI-SSB ANTIBODY: 0.07 RATIO (ref 0–0.99)
ANTI-SSB INTERPRETATION: NEGATIVE
DSDNA AB SER-ACNC: NORMAL [IU]/ML

## 2020-10-19 ENCOUNTER — TELEPHONE (OUTPATIENT)
Dept: RHEUMATOLOGY | Facility: CLINIC | Age: 75
End: 2020-10-19

## 2020-10-19 NOTE — TELEPHONE ENCOUNTER
----- Message from Allen Doran sent at 10/19/2020 10:01 AM CDT -----  Contact: pt  Please call pt at [phone]    Patient has questions about taking traMADoL (ULTRAM) 50 mg tablet    Thank you

## 2020-10-20 ENCOUNTER — TELEPHONE (OUTPATIENT)
Dept: RHEUMATOLOGY | Facility: CLINIC | Age: 75
End: 2020-10-20

## 2020-10-20 DIAGNOSIS — M25.50 POLYARTHRALGIA: Primary | ICD-10-CM

## 2020-10-20 NOTE — TELEPHONE ENCOUNTER
Left message for the patient about setting her up with the pain doctor. Dr Oro said that she don't prescribe narcotics so the patient will have to see PMr

## 2020-10-20 NOTE — TELEPHONE ENCOUNTER
----- Message from Marsha Oro MD sent at 10/20/2020  8:38 AM CDT -----  Contact: pt  Tell her I dont prescribe narcotics but will get hr set up with a pain specialist.  Please set her up with PMR.  Dr. MANJARREZ  ----- Message -----  From: Christine Nunez MA  Sent: 10/19/2020  10:45 AM CDT  To: Marsha Oro MD    Patient said that the medication works great on the pain but it keeps up.So she want be taking it anymore. Wants to know if she can get the Tylenol 3 instead. She said Friday was the last day she took it.   ----- Message -----  From: Allen Doran  Sent: 10/19/2020  10:01 AM CDT  To: [facility] Staff    Please call pt at [phone]    Patient has questions about taking traMADoL (ULTRAM) 50 mg tablet    Thank you

## 2020-12-08 ENCOUNTER — TELEPHONE (OUTPATIENT)
Dept: SPORTS MEDICINE | Facility: CLINIC | Age: 75
End: 2020-12-08

## 2020-12-08 NOTE — TELEPHONE ENCOUNTER
Called and spoke with patient Elena and schedule her an appointment for this Thursday at 10:30 for her right shoulder.    ----- Message from Anastasia Guerrero sent at 12/8/2020 11:00 AM CST -----  Type:  Needs Medical Advice    Who Called: self  Reason:Needs to schedule an injection for the pain in her right shoulder   Would the patient rather a call back or a response via [employer]? call  Best Call Back Number:[phone]  Additional Information: none

## 2020-12-12 NOTE — PROGRESS NOTES
CC: right shoulder pain    75 y.o. Female presents today for CSI injection of her right shoulder (biceps). Pt was referred by Hernandez Min PA-C. Pt reports tenderness in biceps down to right wrist.     Attempted treatments: voltaren gel, tramadol, CSI 05/2020  Pain score: 0/10 at rest, 10/10 when pain returns  History of trauma/injury: none since visit with Hernandez Min  Affecting ADLs: yes     REVIEW OF SYSTEMS:   Constitution: Patient denies fever or chills.  Eyes: Patient denies eye pain or vision changes.  CVS: Patient denies chest pain.  Lungs: Patient denies shortness of breath or cough.  Skin: Patient denies skin rash or itching.    Musculoskeletal: Patient denies recent falls. See HPI.  Psych: Patient denies any current anxiety or nervousness.    PAST MEDICAL HISTORY:   History reviewed. No pertinent past medical history.    MEDICATIONS:     Current Outpatient Medications:     acetaminophen-codeine 300-30mg (TYLENOL #3) 300-30 mg Tab, Take 1 tablet by mouth 2 (two) times daily as needed. for pain, Disp: , Rfl: 0    amLODIPine (NORVASC) 10 MG tablet, Take 10 mg by mouth once daily., Disp: , Rfl: 1    benzonatate (TESSALON) 100 MG capsule, TK ONE C PO Q 8 H PRF COUGH, Disp: , Rfl:     diclofenac sodium (VOLTAREN) 1 % Gel, Apply 2 g topically 4 (four) times daily as needed., Disp: 1 Tube, Rfl: 3    dorzolamide-timolol 2-0.5% (COSOPT) 22.3-6.8 mg/mL ophthalmic solution, INSTILL 1 DROP INTO LEFT EYE TWICE DAILY, Disp: , Rfl:     esomeprazole (NEXIUM) 40 MG capsule, , Disp: , Rfl:     FLUAD QUAD 2020-21,65Y UP,,PF, 60 mcg (15 mcg x 4)/0.5 mL Syrg, ADM 0.5ML IM UTD, Disp: , Rfl:     gabapentin (NEURONTIN) 300 MG capsule, Take 300 mg by mouth 2 (two) times daily., Disp: , Rfl: 0    traMADoL (ULTRAM) 50 mg tablet, Take 1 tablet (50 mg total) by mouth every 12 (twelve) hours as needed for Pain., Disp: 60 tablet, Rfl: 5    ALLERGIES:   Review of patient's allergies indicates:   Allergen Reactions    Percodan  "[oxycodone-aspirin]     Tramadol Other (See Comments)     Insomnia        PHYSICAL EXAMINATION:  /82   Pulse 80   Ht 5' 6" (1.676 m)   Wt 74 kg (163 lb 2.3 oz)   BMI 26.33 kg/m²   There are no signs of infection at the injection site, including no rubor, calor, or skin lesions.  Gen: NAD.  Psych: Affect & judgment nl.  Neuro: Grossly CNI. TRUONG.  HEENT: -Trach dev. -Eye d/c. -Rhinorrhea.  CV: Color nl. -E/C/C. WWPx4.  Pulm: -Dyspnea. -Cough.  Lymph: -Edema.  Int: -Rash/lesion noted.    ASSESSMENT:      ICD-10-CM ICD-9-CM   1. Bicipital tendinitis of right shoulder  M75.21 726.12         PLAN:  Ultrasound-guided injection to the bicipital tendon sheath performed of his stay.  Patient tolerated procedure well.  Patient to follow up with her provider in 6 weeks..    All questions were answered to the best of my ability and all concerns were addressed at this time.    Follow up in 6 week(s) for above, or sooner if needed.      This note is dictated using the M*Modal Fluency Direct word recognition program. There are word recognition mistakes that are occasionally missed on review.        "

## 2020-12-16 ENCOUNTER — OFFICE VISIT (OUTPATIENT)
Dept: SPORTS MEDICINE | Facility: CLINIC | Age: 75
End: 2020-12-16
Payer: MEDICARE

## 2020-12-16 VITALS
WEIGHT: 163.13 LBS | BODY MASS INDEX: 26.22 KG/M2 | HEART RATE: 80 BPM | DIASTOLIC BLOOD PRESSURE: 82 MMHG | HEIGHT: 66 IN | SYSTOLIC BLOOD PRESSURE: 126 MMHG

## 2020-12-16 DIAGNOSIS — M75.21 BICIPITAL TENDINITIS OF RIGHT SHOULDER: Primary | ICD-10-CM

## 2020-12-16 PROCEDURE — 20550 TENDON SHEATH: ICD-10-PCS | Mod: RT,S$GLB,, | Performed by: STUDENT IN AN ORGANIZED HEALTH CARE EDUCATION/TRAINING PROGRAM

## 2020-12-16 PROCEDURE — 99499 UNLISTED E&M SERVICE: CPT | Mod: S$GLB,,, | Performed by: STUDENT IN AN ORGANIZED HEALTH CARE EDUCATION/TRAINING PROGRAM

## 2020-12-16 PROCEDURE — 99999 PR PBB SHADOW E&M-EST. PATIENT-LVL III: CPT | Mod: PBBFAC,,, | Performed by: STUDENT IN AN ORGANIZED HEALTH CARE EDUCATION/TRAINING PROGRAM

## 2020-12-16 PROCEDURE — 20550 NJX 1 TENDON SHEATH/LIGAMENT: CPT | Mod: RT,S$GLB,, | Performed by: STUDENT IN AN ORGANIZED HEALTH CARE EDUCATION/TRAINING PROGRAM

## 2020-12-16 PROCEDURE — 99999 PR PBB SHADOW E&M-EST. PATIENT-LVL III: ICD-10-PCS | Mod: PBBFAC,,, | Performed by: STUDENT IN AN ORGANIZED HEALTH CARE EDUCATION/TRAINING PROGRAM

## 2020-12-16 PROCEDURE — 99499 NO LOS: ICD-10-PCS | Mod: S$GLB,,, | Performed by: STUDENT IN AN ORGANIZED HEALTH CARE EDUCATION/TRAINING PROGRAM

## 2020-12-16 PROCEDURE — 76942 TENDON SHEATH: ICD-10-PCS | Mod: 26,S$GLB,, | Performed by: STUDENT IN AN ORGANIZED HEALTH CARE EDUCATION/TRAINING PROGRAM

## 2020-12-16 PROCEDURE — 76942 ECHO GUIDE FOR BIOPSY: CPT | Mod: 26,S$GLB,, | Performed by: STUDENT IN AN ORGANIZED HEALTH CARE EDUCATION/TRAINING PROGRAM

## 2020-12-16 RX ORDER — TRIAMCINOLONE ACETONIDE 40 MG/ML
40 INJECTION, SUSPENSION INTRA-ARTICULAR; INTRAMUSCULAR
Status: DISCONTINUED | OUTPATIENT
Start: 2020-12-16 | End: 2020-12-16 | Stop reason: HOSPADM

## 2020-12-16 RX ADMIN — TRIAMCINOLONE ACETONIDE 40 MG: 40 INJECTION, SUSPENSION INTRA-ARTICULAR; INTRAMUSCULAR at 09:12

## 2020-12-16 NOTE — PROCEDURES
Tendon Sheath    Date/Time: 12/16/2020 9:30 AM  Performed by: Ashlie Farias MD  Authorized by: Ashlie Farias MD     Indications:  Pain and diagnostic evaluation  Site marked: the procedure site was marked    Timeout: prior to procedure the correct patient, procedure, and site was verified    Anesthetic total (ml):  2 (Ropivacaine 0.2%)    Location:  Shoulder  Site:  R bicep tendon  Ultrasonic guidance for needle placement?: Yes    Needle size:  25 G  Approach:  Dorsal  Medications:  40 mg triamcinolone acetonide 40 mg/mL  Patient tolerance:  Patient tolerated the procedure well with no immediate complications    Additional Comments: TECHNIQUE: Real time ultrasound examination of the right biceps tendon sheath was performed with SonFuzmote Edge 2, 9-L MHz linear probe(s). Ultrasound guidance was used for needle localization. Images were saved and stored for documentation. Short and long axis images of the anterior knee were taken prior to injection. Dynamic visualization of the needle was continuous throughout the procedures and maintained in good position.

## 2021-01-10 ENCOUNTER — IMMUNIZATION (OUTPATIENT)
Dept: PRIMARY CARE CLINIC | Facility: CLINIC | Age: 76
End: 2021-01-10
Payer: MEDICARE

## 2021-01-10 DIAGNOSIS — Z23 NEED FOR VACCINATION: ICD-10-CM

## 2021-01-10 PROCEDURE — 91300 COVID-19, MRNA, LNP-S, PF, 30 MCG/0.3 ML DOSE VACCINE: CPT | Mod: PBBFAC | Performed by: FAMILY MEDICINE

## 2021-01-27 ENCOUNTER — OFFICE VISIT (OUTPATIENT)
Dept: SPORTS MEDICINE | Facility: CLINIC | Age: 76
End: 2021-01-27
Payer: MEDICARE

## 2021-01-27 VITALS
SYSTOLIC BLOOD PRESSURE: 124 MMHG | HEIGHT: 66 IN | HEART RATE: 87 BPM | DIASTOLIC BLOOD PRESSURE: 78 MMHG | BODY MASS INDEX: 26.2 KG/M2 | WEIGHT: 163 LBS

## 2021-01-27 DIAGNOSIS — M19.011 PRIMARY OSTEOARTHRITIS OF RIGHT SHOULDER: Primary | ICD-10-CM

## 2021-01-27 DIAGNOSIS — M25.511 RIGHT SHOULDER PAIN, UNSPECIFIED CHRONICITY: ICD-10-CM

## 2021-01-27 PROCEDURE — 1101F PT FALLS ASSESS-DOCD LE1/YR: CPT | Mod: CPTII,S$GLB,, | Performed by: PHYSICIAN ASSISTANT

## 2021-01-27 PROCEDURE — 1126F AMNT PAIN NOTED NONE PRSNT: CPT | Mod: S$GLB,,, | Performed by: PHYSICIAN ASSISTANT

## 2021-01-27 PROCEDURE — 99999 PR PBB SHADOW E&M-EST. PATIENT-LVL III: CPT | Mod: PBBFAC,,, | Performed by: PHYSICIAN ASSISTANT

## 2021-01-27 PROCEDURE — 3288F FALL RISK ASSESSMENT DOCD: CPT | Mod: CPTII,S$GLB,, | Performed by: PHYSICIAN ASSISTANT

## 2021-01-27 PROCEDURE — 1159F MED LIST DOCD IN RCRD: CPT | Mod: S$GLB,,, | Performed by: PHYSICIAN ASSISTANT

## 2021-01-27 PROCEDURE — 99213 PR OFFICE/OUTPT VISIT, EST, LEVL III, 20-29 MIN: ICD-10-PCS | Mod: S$GLB,,, | Performed by: PHYSICIAN ASSISTANT

## 2021-01-27 PROCEDURE — 1159F PR MEDICATION LIST DOCUMENTED IN MEDICAL RECORD: ICD-10-PCS | Mod: S$GLB,,, | Performed by: PHYSICIAN ASSISTANT

## 2021-01-27 PROCEDURE — 1126F PR PAIN SEVERITY QUANTIFIED, NO PAIN PRESENT: ICD-10-PCS | Mod: S$GLB,,, | Performed by: PHYSICIAN ASSISTANT

## 2021-01-27 PROCEDURE — 99213 OFFICE O/P EST LOW 20 MIN: CPT | Mod: S$GLB,,, | Performed by: PHYSICIAN ASSISTANT

## 2021-01-27 PROCEDURE — 1101F PR PT FALLS ASSESS DOC 0-1 FALLS W/OUT INJ PAST YR: ICD-10-PCS | Mod: CPTII,S$GLB,, | Performed by: PHYSICIAN ASSISTANT

## 2021-01-27 PROCEDURE — 3288F PR FALLS RISK ASSESSMENT DOCUMENTED: ICD-10-PCS | Mod: CPTII,S$GLB,, | Performed by: PHYSICIAN ASSISTANT

## 2021-01-27 PROCEDURE — 99999 PR PBB SHADOW E&M-EST. PATIENT-LVL III: ICD-10-PCS | Mod: PBBFAC,,, | Performed by: PHYSICIAN ASSISTANT

## 2021-01-31 ENCOUNTER — IMMUNIZATION (OUTPATIENT)
Dept: PRIMARY CARE CLINIC | Facility: CLINIC | Age: 76
End: 2021-01-31
Payer: MEDICARE

## 2021-01-31 DIAGNOSIS — Z23 NEED FOR VACCINATION: Primary | ICD-10-CM

## 2021-01-31 PROCEDURE — 91300 COVID-19, MRNA, LNP-S, PF, 30 MCG/0.3 ML DOSE VACCINE: CPT | Mod: PBBFAC | Performed by: EMERGENCY MEDICINE

## 2021-01-31 PROCEDURE — 0002A COVID-19, MRNA, LNP-S, PF, 30 MCG/0.3 ML DOSE VACCINE: CPT | Mod: PBBFAC | Performed by: EMERGENCY MEDICINE

## 2021-02-23 ENCOUNTER — PATIENT MESSAGE (OUTPATIENT)
Dept: RHEUMATOLOGY | Facility: CLINIC | Age: 76
End: 2021-02-23

## 2021-03-16 ENCOUNTER — OFFICE VISIT (OUTPATIENT)
Dept: NEUROLOGY | Facility: CLINIC | Age: 76
End: 2021-03-16
Payer: MEDICARE

## 2021-03-16 ENCOUNTER — HOSPITAL ENCOUNTER (OUTPATIENT)
Dept: RADIOLOGY | Facility: HOSPITAL | Age: 76
Discharge: HOME | End: 2021-03-16
Attending: NEUROLOGICAL SURGERY
Payer: MEDICARE

## 2021-03-16 VITALS
WEIGHT: 157.88 LBS | HEART RATE: 88 BPM | DIASTOLIC BLOOD PRESSURE: 74 MMHG | SYSTOLIC BLOOD PRESSURE: 121 MMHG | HEIGHT: 66 IN | BODY MASS INDEX: 25.37 KG/M2

## 2021-03-16 DIAGNOSIS — I73.9 ASYMPTOMATIC PVD (PERIPHERAL VASCULAR DISEASE): ICD-10-CM

## 2021-03-16 DIAGNOSIS — M54.12 CERVICAL RADICULOPATHY: Primary | ICD-10-CM

## 2021-03-16 DIAGNOSIS — R20.2 RIGHT LEG PARESTHESIAS: ICD-10-CM

## 2021-03-16 DIAGNOSIS — M54.12 CERVICAL RADICULOPATHY: ICD-10-CM

## 2021-03-16 PROCEDURE — 1101F PT FALLS ASSESS-DOCD LE1/YR: CPT | Mod: CPTII,S$GLB,, | Performed by: NEUROLOGICAL SURGERY

## 2021-03-16 PROCEDURE — 3288F FALL RISK ASSESSMENT DOCD: CPT | Mod: CPTII,S$GLB,, | Performed by: NEUROLOGICAL SURGERY

## 2021-03-16 PROCEDURE — 72052 XR CERVICAL SPINE 5 VIEW WITH FLEX AND EXT: ICD-10-PCS | Mod: 26,,, | Performed by: RADIOLOGY

## 2021-03-16 PROCEDURE — 99204 OFFICE O/P NEW MOD 45 MIN: CPT | Mod: S$GLB,,, | Performed by: NEUROLOGICAL SURGERY

## 2021-03-16 PROCEDURE — 3288F PR FALLS RISK ASSESSMENT DOCUMENTED: ICD-10-PCS | Mod: CPTII,S$GLB,, | Performed by: NEUROLOGICAL SURGERY

## 2021-03-16 PROCEDURE — 1126F PR PAIN SEVERITY QUANTIFIED, NO PAIN PRESENT: ICD-10-PCS | Mod: S$GLB,,, | Performed by: NEUROLOGICAL SURGERY

## 2021-03-16 PROCEDURE — 1126F AMNT PAIN NOTED NONE PRSNT: CPT | Mod: S$GLB,,, | Performed by: NEUROLOGICAL SURGERY

## 2021-03-16 PROCEDURE — 72052 X-RAY EXAM NECK SPINE 6/>VWS: CPT | Mod: TC,FY

## 2021-03-16 PROCEDURE — 99204 PR OFFICE/OUTPT VISIT, NEW, LEVL IV, 45-59 MIN: ICD-10-PCS | Mod: S$GLB,,, | Performed by: NEUROLOGICAL SURGERY

## 2021-03-16 PROCEDURE — 1101F PR PT FALLS ASSESS DOC 0-1 FALLS W/OUT INJ PAST YR: ICD-10-PCS | Mod: CPTII,S$GLB,, | Performed by: NEUROLOGICAL SURGERY

## 2021-03-16 PROCEDURE — 1159F MED LIST DOCD IN RCRD: CPT | Mod: S$GLB,,, | Performed by: NEUROLOGICAL SURGERY

## 2021-03-16 PROCEDURE — 72052 X-RAY EXAM NECK SPINE 6/>VWS: CPT | Mod: 26,,, | Performed by: RADIOLOGY

## 2021-03-16 PROCEDURE — 99999 PR PBB SHADOW E&M-EST. PATIENT-LVL III: CPT | Mod: PBBFAC,,, | Performed by: NEUROLOGICAL SURGERY

## 2021-03-16 PROCEDURE — 1159F PR MEDICATION LIST DOCUMENTED IN MEDICAL RECORD: ICD-10-PCS | Mod: S$GLB,,, | Performed by: NEUROLOGICAL SURGERY

## 2021-03-16 PROCEDURE — 99999 PR PBB SHADOW E&M-EST. PATIENT-LVL III: ICD-10-PCS | Mod: PBBFAC,,, | Performed by: NEUROLOGICAL SURGERY

## 2021-03-18 ENCOUNTER — PATIENT MESSAGE (OUTPATIENT)
Dept: RESEARCH | Facility: HOSPITAL | Age: 76
End: 2021-03-18

## 2021-03-26 ENCOUNTER — PATIENT MESSAGE (OUTPATIENT)
Dept: RESEARCH | Facility: HOSPITAL | Age: 76
End: 2021-03-26

## 2021-04-16 ENCOUNTER — OFFICE VISIT (OUTPATIENT)
Dept: URGENT CARE | Facility: CLINIC | Age: 76
End: 2021-04-16
Payer: MEDICARE

## 2021-04-16 VITALS
SYSTOLIC BLOOD PRESSURE: 123 MMHG | HEART RATE: 73 BPM | DIASTOLIC BLOOD PRESSURE: 85 MMHG | OXYGEN SATURATION: 97 % | BODY MASS INDEX: 25.37 KG/M2 | TEMPERATURE: 98 F | WEIGHT: 157.88 LBS | HEIGHT: 66 IN | RESPIRATION RATE: 16 BRPM

## 2021-04-16 DIAGNOSIS — R51.9 SINUS HEADACHE: Primary | ICD-10-CM

## 2021-04-16 DIAGNOSIS — R09.82 PND (POST-NASAL DRIP): ICD-10-CM

## 2021-04-16 DIAGNOSIS — H65.01 NON-RECURRENT ACUTE SEROUS OTITIS MEDIA OF RIGHT EAR: ICD-10-CM

## 2021-04-16 LAB
CTP QC/QA: YES
SARS-COV-2 RDRP RESP QL NAA+PROBE: NEGATIVE

## 2021-04-16 PROCEDURE — U0002: ICD-10-PCS | Mod: QW,S$GLB,, | Performed by: INTERNAL MEDICINE

## 2021-04-16 PROCEDURE — 99203 PR OFFICE/OUTPT VISIT, NEW, LEVL III, 30-44 MIN: ICD-10-PCS | Mod: CS,S$GLB,, | Performed by: INTERNAL MEDICINE

## 2021-04-16 PROCEDURE — U0002 COVID-19 LAB TEST NON-CDC: HCPCS | Mod: QW,S$GLB,, | Performed by: INTERNAL MEDICINE

## 2021-04-16 PROCEDURE — 99203 OFFICE O/P NEW LOW 30 MIN: CPT | Mod: CS,S$GLB,, | Performed by: INTERNAL MEDICINE

## 2021-04-16 RX ORDER — LEVOCETIRIZINE DIHYDROCHLORIDE 5 MG/1
5 TABLET, FILM COATED ORAL NIGHTLY
Qty: 30 TABLET | Refills: 11 | Status: SHIPPED | OUTPATIENT
Start: 2021-04-16 | End: 2022-12-05 | Stop reason: ALTCHOICE

## 2021-04-16 RX ORDER — PREDNISONE 20 MG/1
20 TABLET ORAL DAILY
Qty: 3 TABLET | Refills: 0 | Status: SHIPPED | OUTPATIENT
Start: 2021-04-16 | End: 2021-04-19

## 2021-04-16 RX ORDER — FLUTICASONE PROPIONATE 50 MCG
1 SPRAY, SUSPENSION (ML) NASAL DAILY
Qty: 11.1 ML | Refills: 0 | Status: SHIPPED | OUTPATIENT
Start: 2021-04-16

## 2021-10-12 ENCOUNTER — HOSPITAL ENCOUNTER (OUTPATIENT)
Dept: RADIOLOGY | Facility: HOSPITAL | Age: 76
Discharge: HOME | End: 2021-10-12
Attending: PHYSICIAN ASSISTANT
Payer: MEDICARE

## 2021-10-12 ENCOUNTER — OFFICE VISIT (OUTPATIENT)
Dept: SPORTS MEDICINE | Facility: CLINIC | Age: 76
End: 2021-10-12
Payer: MEDICARE

## 2021-10-12 VITALS
DIASTOLIC BLOOD PRESSURE: 73 MMHG | BODY MASS INDEX: 25.55 KG/M2 | SYSTOLIC BLOOD PRESSURE: 127 MMHG | WEIGHT: 159 LBS | HEIGHT: 66 IN | HEART RATE: 80 BPM

## 2021-10-12 DIAGNOSIS — M25.511 RIGHT SHOULDER PAIN, UNSPECIFIED CHRONICITY: ICD-10-CM

## 2021-10-12 DIAGNOSIS — M19.011 PRIMARY OSTEOARTHRITIS OF RIGHT SHOULDER: Primary | ICD-10-CM

## 2021-10-12 DIAGNOSIS — M62.838 MUSCLE SPASMS OF NECK: ICD-10-CM

## 2021-10-12 PROCEDURE — 73030 X-RAY EXAM OF SHOULDER: CPT | Mod: 26,RT,, | Performed by: RADIOLOGY

## 2021-10-12 PROCEDURE — 1125F PR PAIN SEVERITY QUANTIFIED, PAIN PRESENT: ICD-10-PCS | Mod: CPTII,S$GLB,, | Performed by: PHYSICIAN ASSISTANT

## 2021-10-12 PROCEDURE — 3074F PR MOST RECENT SYSTOLIC BLOOD PRESSURE < 130 MM HG: ICD-10-PCS | Mod: CPTII,S$GLB,, | Performed by: PHYSICIAN ASSISTANT

## 2021-10-12 PROCEDURE — 20610 PR DRAIN/INJECT LARGE JOINT/BURSA: ICD-10-PCS | Mod: RT,S$GLB,, | Performed by: PHYSICIAN ASSISTANT

## 2021-10-12 PROCEDURE — 20610 DRAIN/INJ JOINT/BURSA W/O US: CPT | Mod: RT,S$GLB,, | Performed by: PHYSICIAN ASSISTANT

## 2021-10-12 PROCEDURE — 73030 X-RAY EXAM OF SHOULDER: CPT | Mod: TC,RT

## 2021-10-12 PROCEDURE — 73030 XR SHOULDER COMPLETE 2 OR MORE VIEWS RIGHT: ICD-10-PCS | Mod: 26,RT,, | Performed by: RADIOLOGY

## 2021-10-12 PROCEDURE — 1159F MED LIST DOCD IN RCRD: CPT | Mod: CPTII,S$GLB,, | Performed by: PHYSICIAN ASSISTANT

## 2021-10-12 PROCEDURE — 1101F PT FALLS ASSESS-DOCD LE1/YR: CPT | Mod: CPTII,S$GLB,, | Performed by: PHYSICIAN ASSISTANT

## 2021-10-12 PROCEDURE — 99213 OFFICE O/P EST LOW 20 MIN: CPT | Mod: 25,S$GLB,, | Performed by: PHYSICIAN ASSISTANT

## 2021-10-12 PROCEDURE — 1160F RVW MEDS BY RX/DR IN RCRD: CPT | Mod: CPTII,S$GLB,, | Performed by: PHYSICIAN ASSISTANT

## 2021-10-12 PROCEDURE — 1160F PR REVIEW ALL MEDS BY PRESCRIBER/CLIN PHARMACIST DOCUMENTED: ICD-10-PCS | Mod: CPTII,S$GLB,, | Performed by: PHYSICIAN ASSISTANT

## 2021-10-12 PROCEDURE — 1159F PR MEDICATION LIST DOCUMENTED IN MEDICAL RECORD: ICD-10-PCS | Mod: CPTII,S$GLB,, | Performed by: PHYSICIAN ASSISTANT

## 2021-10-12 PROCEDURE — 3288F PR FALLS RISK ASSESSMENT DOCUMENTED: ICD-10-PCS | Mod: CPTII,S$GLB,, | Performed by: PHYSICIAN ASSISTANT

## 2021-10-12 PROCEDURE — 99213 PR OFFICE/OUTPT VISIT, EST, LEVL III, 20-29 MIN: ICD-10-PCS | Mod: 25,S$GLB,, | Performed by: PHYSICIAN ASSISTANT

## 2021-10-12 PROCEDURE — 1101F PR PT FALLS ASSESS DOC 0-1 FALLS W/OUT INJ PAST YR: ICD-10-PCS | Mod: CPTII,S$GLB,, | Performed by: PHYSICIAN ASSISTANT

## 2021-10-12 PROCEDURE — 3078F PR MOST RECENT DIASTOLIC BLOOD PRESSURE < 80 MM HG: ICD-10-PCS | Mod: CPTII,S$GLB,, | Performed by: PHYSICIAN ASSISTANT

## 2021-10-12 PROCEDURE — 3078F DIAST BP <80 MM HG: CPT | Mod: CPTII,S$GLB,, | Performed by: PHYSICIAN ASSISTANT

## 2021-10-12 PROCEDURE — 99999 PR PBB SHADOW E&M-EST. PATIENT-LVL III: CPT | Mod: PBBFAC,,, | Performed by: PHYSICIAN ASSISTANT

## 2021-10-12 PROCEDURE — 3074F SYST BP LT 130 MM HG: CPT | Mod: CPTII,S$GLB,, | Performed by: PHYSICIAN ASSISTANT

## 2021-10-12 PROCEDURE — 1125F AMNT PAIN NOTED PAIN PRSNT: CPT | Mod: CPTII,S$GLB,, | Performed by: PHYSICIAN ASSISTANT

## 2021-10-12 PROCEDURE — 3288F FALL RISK ASSESSMENT DOCD: CPT | Mod: CPTII,S$GLB,, | Performed by: PHYSICIAN ASSISTANT

## 2021-10-12 PROCEDURE — 99999 PR PBB SHADOW E&M-EST. PATIENT-LVL III: ICD-10-PCS | Mod: PBBFAC,,, | Performed by: PHYSICIAN ASSISTANT

## 2021-10-12 RX ORDER — CHLORZOXAZONE 500 MG/1
500 TABLET ORAL 3 TIMES DAILY
Qty: 21 TABLET | Refills: 0 | Status: SHIPPED | OUTPATIENT
Start: 2021-10-12 | End: 2021-10-19

## 2021-10-12 RX ORDER — TRIAMCINOLONE ACETONIDE 40 MG/ML
40 INJECTION, SUSPENSION INTRA-ARTICULAR; INTRAMUSCULAR
Status: COMPLETED | OUTPATIENT
Start: 2021-10-12 | End: 2021-10-12

## 2021-10-12 RX ADMIN — TRIAMCINOLONE ACETONIDE 40 MG: 40 INJECTION, SUSPENSION INTRA-ARTICULAR; INTRAMUSCULAR at 10:10

## 2022-05-05 ENCOUNTER — OFFICE VISIT (OUTPATIENT)
Dept: SPORTS MEDICINE | Facility: CLINIC | Age: 77
End: 2022-05-05
Payer: MEDICARE

## 2022-05-05 DIAGNOSIS — M19.011 PRIMARY OSTEOARTHRITIS OF RIGHT SHOULDER: Primary | ICD-10-CM

## 2022-05-05 DIAGNOSIS — M25.511 RIGHT SHOULDER PAIN, UNSPECIFIED CHRONICITY: ICD-10-CM

## 2022-05-05 DIAGNOSIS — M62.838 MUSCLE SPASMS OF NECK: ICD-10-CM

## 2022-05-05 PROCEDURE — 99213 PR OFFICE/OUTPT VISIT, EST, LEVL III, 20-29 MIN: ICD-10-PCS | Mod: 25,S$GLB,, | Performed by: PHYSICIAN ASSISTANT

## 2022-05-05 PROCEDURE — 1101F PT FALLS ASSESS-DOCD LE1/YR: CPT | Mod: CPTII,S$GLB,, | Performed by: PHYSICIAN ASSISTANT

## 2022-05-05 PROCEDURE — 1159F MED LIST DOCD IN RCRD: CPT | Mod: CPTII,S$GLB,, | Performed by: PHYSICIAN ASSISTANT

## 2022-05-05 PROCEDURE — 20610 DRAIN/INJ JOINT/BURSA W/O US: CPT | Mod: RT,S$GLB,, | Performed by: PHYSICIAN ASSISTANT

## 2022-05-05 PROCEDURE — 20610 PR DRAIN/INJECT LARGE JOINT/BURSA: ICD-10-PCS | Mod: RT,S$GLB,, | Performed by: PHYSICIAN ASSISTANT

## 2022-05-05 PROCEDURE — 1101F PR PT FALLS ASSESS DOC 0-1 FALLS W/OUT INJ PAST YR: ICD-10-PCS | Mod: CPTII,S$GLB,, | Performed by: PHYSICIAN ASSISTANT

## 2022-05-05 PROCEDURE — 1159F PR MEDICATION LIST DOCUMENTED IN MEDICAL RECORD: ICD-10-PCS | Mod: CPTII,S$GLB,, | Performed by: PHYSICIAN ASSISTANT

## 2022-05-05 PROCEDURE — 3288F FALL RISK ASSESSMENT DOCD: CPT | Mod: CPTII,S$GLB,, | Performed by: PHYSICIAN ASSISTANT

## 2022-05-05 PROCEDURE — 3288F PR FALLS RISK ASSESSMENT DOCUMENTED: ICD-10-PCS | Mod: CPTII,S$GLB,, | Performed by: PHYSICIAN ASSISTANT

## 2022-05-05 PROCEDURE — 99213 OFFICE O/P EST LOW 20 MIN: CPT | Mod: 25,S$GLB,, | Performed by: PHYSICIAN ASSISTANT

## 2022-05-05 PROCEDURE — 99999 PR PBB SHADOW E&M-EST. PATIENT-LVL III: ICD-10-PCS | Mod: PBBFAC,,, | Performed by: PHYSICIAN ASSISTANT

## 2022-05-05 PROCEDURE — 99999 PR PBB SHADOW E&M-EST. PATIENT-LVL III: CPT | Mod: PBBFAC,,, | Performed by: PHYSICIAN ASSISTANT

## 2022-05-05 RX ORDER — TRIAMCINOLONE ACETONIDE 40 MG/ML
40 INJECTION, SUSPENSION INTRA-ARTICULAR; INTRAMUSCULAR
Status: COMPLETED | OUTPATIENT
Start: 2022-05-05 | End: 2022-05-05

## 2022-05-05 RX ORDER — CHLORZOXAZONE 500 MG/1
500 TABLET ORAL 3 TIMES DAILY
Qty: 21 TABLET | Refills: 0 | Status: SHIPPED | OUTPATIENT
Start: 2022-05-05 | End: 2022-05-12

## 2022-05-05 RX ADMIN — TRIAMCINOLONE ACETONIDE 40 MG: 40 INJECTION, SUSPENSION INTRA-ARTICULAR; INTRAMUSCULAR at 08:05

## 2022-05-05 NOTE — PROGRESS NOTES
CC: Right shoulder pain     76 y.o. Female presents as an established patient following up for right shoulder pain, requesting repeat CSI.  She states her shoulder was feeling great after her latest injection in October up until a week or so ago.  She states that she sometimes forgets about the arthritis and pain in her shoulder and will go to lift an object to move her shoulder quickly.  She states that she has not had any new injury mechanism, rather her shoulder just gradually started bothering her again.   She states the pain is a constant soreness, located over the lateral aspect of her shoulder that does radiate down her arm.  She denies any anterior shoulder pain.  Pain is worse with lifting objects overhead, and sleeping on her shoulder at night.  She is here today requesting repeat steroid injection her shoulder as she had significant relief with this in the past.    PMHx notable for HTN.   Negative for tobacco.   Negative for diabetes.          PAST MEDICAL HISTORY:   No past medical history on file.    PAST SURGICAL HISTORY:  No past surgical history on file.    FAMILY HISTORY:  No family history on file.    MEDICATIONS:    Current Outpatient Medications:     acetaminophen-codeine 300-30mg (TYLENOL #3) 300-30 mg Tab, Take 1 tablet by mouth 2 (two) times daily as needed. for pain, Disp: , Rfl: 0    amLODIPine (NORVASC) 10 MG tablet, Take 10 mg by mouth once daily., Disp: , Rfl: 1    benzonatate (TESSALON) 100 MG capsule, TK ONE C PO Q 8 H PRF COUGH, Disp: , Rfl:     diclofenac sodium (VOLTAREN) 1 % Gel, APPLY 2 GRAMS EXTERNALLY TO THE AFFECTED AREA FOUR TIMES DAILY AS NEEDED, Disp: 100 g, Rfl: 4    dorzolamide-timolol 2-0.5% (COSOPT) 22.3-6.8 mg/mL ophthalmic solution, INSTILL 1 DROP INTO LEFT EYE TWICE DAILY, Disp: , Rfl:     esomeprazole (NEXIUM) 40 MG capsule, , Disp: , Rfl:     FLUAD QUAD 2020-21,65Y UP,,PF, 60 mcg (15 mcg x 4)/0.5 mL Syrg, ADM 0.5ML IM UTD, Disp: , Rfl:     fluticasone  propionate (FLONASE) 50 mcg/actuation nasal spray, 1 spray (50 mcg total) by Each Nostril route once daily., Disp: 11.1 mL, Rfl: 0    gabapentin (NEURONTIN) 300 MG capsule, Take 300 mg by mouth 2 (two) times daily., Disp: , Rfl: 0    levocetirizine (XYZAL) 5 MG tablet, Take 1 tablet (5 mg total) by mouth every evening., Disp: 30 tablet, Rfl: 11    traMADoL (ULTRAM) 50 mg tablet, Take 1 tablet (50 mg total) by mouth every 12 (twelve) hours as needed for Pain. (Patient not taking: Reported on 10/12/2021), Disp: 60 tablet, Rfl: 5    ALLERGIES:  Review of patient's allergies indicates:   Allergen Reactions    Percodan [oxycodone-aspirin]     Tramadol Other (See Comments)     Insomnia        REVIEW OF SYSTEMS:  Constitution: Negative. Negative for chills, fever and night sweats.    Hematologic/Lymphatic: Negative for bleeding problem. Does not bruise/bleed easily.   Skin: Negative for dry skin, itching and rash.   Musculoskeletal: Negative for falls. Positive for right shoulder pain and muscle weakness.     All other review of symptoms were reviewed and found to be noncontributory.     PHYSICAL EXAMINATION:  Vitals:  There were no vitals taken for this visit.   General: Well-developed well-nourished 76 y.o. femalein no acute distress   Cardiovascular: Regular rhythm by palpation of distal pulse, normal color and temperature, no concerning varicosities on symptomatic side   Lungs: No labored breathing or wheezing appreciated   Neuro: Alert and oriented ×3   Psychiatric: well oriented to person, place and time, demonstrates normal mood and affect   Skin: No rashes, lesions or ulcers, normal temperature, turgor, and texture on uninvolved extremity    Ortho/SPM Exam  Examination of the right shoulder demonstrates no swelling.  Active forward elevation to 130° compared to 150° on the left side.  Passive forward elevation the right side also to 150° without discomfort to biceps region of shoulder.  Active and passive  external rotation to about 30°. Internal rotation to the iliac crest.  5-/5 scaption strength.  4+/5 external rotation strength.   Belly press negative.  Negative Spurling's exam.  No midline neck tenderness. Mild TTP over right trap.    Previous IMAGING:  Xrays including AP, Outlet and Axillary Lateral of RIGHT shoulder are ordered / images reviewed by me:   Advanced glenohumeral DJD with a large inferior goat's beard osteophyte.  Anterior humeral head subluxation.     ASSESSMENT:      ICD-10-CM ICD-9-CM   1. Primary osteoarthritis of right shoulder  M19.011 715.11   2. Right shoulder pain, unspecified chronicity  M25.511 719.41   3. Muscle spasms of neck  M62.838 728.85         PLAN:     1.  Discussed examination findings and imaging with patient in clinic today.  2. CSI right shoulder in clinic today  3. Follow up as needed for shoulder pain  4. She is not interested in total shoulder replacement in the future even if her pain presents, will continue conservative care  5. Parafon forte 500mg PO TID for muscle spasm of neck, advised that this can cause drowsiness so she should not try to operate a vehicle after taking this medication.  6. Patient verbalized understanding.    The injection site was identified and the skin was prepared with an ETOH solution. The right shoulder was injected with 1 ml of Kenalog 40mg and 4 ml ropivacaine under sterile technique. Elena Willingham tolerated the procedure well, she was advised to rest the right shoulder today, ice and support. she did receive immediate relief of the pain in and about her right shoulder. she was told this would be short lived and is secondary to the ropivacaine. she may have an increase in her discomfort tonight followed by steady improvement over the next several days. It may take 1-3 weeks following the injection to get the full benefit of the medication.  I will see her back in 4-6 months. Sooner if she has any problems or concerns.

## 2022-05-26 ENCOUNTER — OFFICE VISIT (OUTPATIENT)
Dept: URGENT CARE | Facility: CLINIC | Age: 77
End: 2022-05-26
Payer: MEDICARE

## 2022-05-26 VITALS
HEIGHT: 66 IN | OXYGEN SATURATION: 98 % | DIASTOLIC BLOOD PRESSURE: 77 MMHG | HEART RATE: 89 BPM | SYSTOLIC BLOOD PRESSURE: 116 MMHG | TEMPERATURE: 98 F | BODY MASS INDEX: 25.55 KG/M2 | RESPIRATION RATE: 18 BRPM | WEIGHT: 159 LBS

## 2022-05-26 DIAGNOSIS — J01.90 ACUTE SINUSITIS, RECURRENCE NOT SPECIFIED, UNSPECIFIED LOCATION: Primary | ICD-10-CM

## 2022-05-26 DIAGNOSIS — R05.9 COUGH: ICD-10-CM

## 2022-05-26 DIAGNOSIS — J30.9 ALLERGIC RHINITIS, UNSPECIFIED SEASONALITY, UNSPECIFIED TRIGGER: ICD-10-CM

## 2022-05-26 LAB
CTP QC/QA: YES
SARS-COV-2 RDRP RESP QL NAA+PROBE: NEGATIVE

## 2022-05-26 PROCEDURE — 3078F DIAST BP <80 MM HG: CPT | Mod: CPTII,S$GLB,, | Performed by: NURSE PRACTITIONER

## 2022-05-26 PROCEDURE — 1159F PR MEDICATION LIST DOCUMENTED IN MEDICAL RECORD: ICD-10-PCS | Mod: CPTII,S$GLB,, | Performed by: NURSE PRACTITIONER

## 2022-05-26 PROCEDURE — U0002: ICD-10-PCS | Mod: QW,S$GLB,, | Performed by: NURSE PRACTITIONER

## 2022-05-26 PROCEDURE — 1160F PR REVIEW ALL MEDS BY PRESCRIBER/CLIN PHARMACIST DOCUMENTED: ICD-10-PCS | Mod: CPTII,S$GLB,, | Performed by: NURSE PRACTITIONER

## 2022-05-26 PROCEDURE — 1126F AMNT PAIN NOTED NONE PRSNT: CPT | Mod: CPTII,S$GLB,, | Performed by: NURSE PRACTITIONER

## 2022-05-26 PROCEDURE — U0002 COVID-19 LAB TEST NON-CDC: HCPCS | Mod: QW,S$GLB,, | Performed by: NURSE PRACTITIONER

## 2022-05-26 PROCEDURE — 1159F MED LIST DOCD IN RCRD: CPT | Mod: CPTII,S$GLB,, | Performed by: NURSE PRACTITIONER

## 2022-05-26 PROCEDURE — 99214 OFFICE O/P EST MOD 30 MIN: CPT | Mod: S$GLB,,, | Performed by: NURSE PRACTITIONER

## 2022-05-26 PROCEDURE — 99214 PR OFFICE/OUTPT VISIT, EST, LEVL IV, 30-39 MIN: ICD-10-PCS | Mod: S$GLB,,, | Performed by: NURSE PRACTITIONER

## 2022-05-26 PROCEDURE — 1126F PR PAIN SEVERITY QUANTIFIED, NO PAIN PRESENT: ICD-10-PCS | Mod: CPTII,S$GLB,, | Performed by: NURSE PRACTITIONER

## 2022-05-26 PROCEDURE — 3074F SYST BP LT 130 MM HG: CPT | Mod: CPTII,S$GLB,, | Performed by: NURSE PRACTITIONER

## 2022-05-26 PROCEDURE — 3078F PR MOST RECENT DIASTOLIC BLOOD PRESSURE < 80 MM HG: ICD-10-PCS | Mod: CPTII,S$GLB,, | Performed by: NURSE PRACTITIONER

## 2022-05-26 PROCEDURE — 3074F PR MOST RECENT SYSTOLIC BLOOD PRESSURE < 130 MM HG: ICD-10-PCS | Mod: CPTII,S$GLB,, | Performed by: NURSE PRACTITIONER

## 2022-05-26 PROCEDURE — 1160F RVW MEDS BY RX/DR IN RCRD: CPT | Mod: CPTII,S$GLB,, | Performed by: NURSE PRACTITIONER

## 2022-05-26 RX ORDER — BENZONATATE 100 MG/1
100 CAPSULE ORAL 3 TIMES DAILY PRN
Qty: 30 CAPSULE | Refills: 1 | Status: SHIPPED | OUTPATIENT
Start: 2022-05-26

## 2022-05-26 RX ORDER — AZELASTINE 1 MG/ML
1 SPRAY, METERED NASAL 2 TIMES DAILY
Qty: 30 ML | Refills: 1 | Status: SHIPPED | OUTPATIENT
Start: 2022-05-26 | End: 2022-12-05

## 2022-05-26 NOTE — PATIENT INSTRUCTIONS
Return to Urgent Care or go to ER if symptoms worsen or fail to improve.  Follow up with PCP as recommended for further management.     THE FOLLOWING ARE RECOMMENDED TO HELP YOU MANAGE YOUR SYMPTOMS:    Use Nasal Saline Ocean Spray to relieve sinus congestion and pain. It is recommended that you use the nasal saline prior to using any topical nasal sprays to help clear the mucus so the medication is able to get to the mucus membranes.      Take Coricidin HBP cough/cold syrup (with dextromethorphan for cough) according to label instructions as needed for cough.     Use Flonase or similar Over the Counter steroid nasal spray -- 1-2 sprays/nostril per day-- you may initially use it 2xs/day x 5 days to help with symptoms; then resume 1-2 sprays/nostril per day. Spray towards the outer side of the nose; do not spray straight back and do not spray to the center of the nose, as it will not work as effectively and may cause your nose to bleed.     Take Acetaminophen according to label instructions for fever, throat pain, headache, and body aches    Use warm salt water gargles to ease your throat pain. Warm salt water gargles as needed for sore throat-  1/2 tsp salt to 1 cup warm water, gargle as desired.

## 2022-05-26 NOTE — PROGRESS NOTES
"Subjective:       Patient ID: Elena Willingham is a 76 y.o. female.    Vitals:  height is 5' 6" (1.676 m) and weight is 72.1 kg (159 lb). Her temperature is 98 °F (36.7 °C). Her blood pressure is 116/77 and her pulse is 89. Her respiration is 18 and oxygen saturation is 98%.     Chief Complaint: COVID-19 Concerns and Cough    Cough  This is a new problem. The current episode started 1 to 4 weeks ago (1 week now ). The problem occurs every few minutes. The cough is productive of sputum. Associated symptoms include headaches, nasal congestion and postnasal drip. Pertinent negatives include no chills, fever, sore throat, shortness of breath, weight loss or wheezing. Nothing aggravates the symptoms. She has tried OTC cough suppressant for the symptoms. The treatment provided mild relief. Her past medical history is significant for bronchitis and environmental allergies. There is no history of asthma, COPD or pneumonia.       Constitution: Negative for chills, sweating, fatigue and fever.   HENT: Positive for congestion and postnasal drip. Negative for sore throat.    Cardiovascular: Negative for leg swelling, palpitations, sob on exertion and passing out.   Respiratory: Positive for cough. Negative for chest tightness, shortness of breath and wheezing.    Allergic/Immunologic: Positive for environmental allergies and seasonal allergies.   Neurological: Positive for headaches.       Objective:      Physical Exam   Constitutional:  Non-toxic appearance. She does not appear ill. No distress.   HENT:   Nose: Mucosal edema and rhinorrhea present. No purulent discharge.   Mouth/Throat: Uvula is midline. No uvula swelling. Posterior oropharyngeal erythema present. No oropharyngeal exudate or posterior oropharyngeal edema.   Cardiovascular: Normal rate and regular rhythm.   Pulmonary/Chest: Effort normal and breath sounds normal. No stridor. No respiratory distress. She has no wheezes.   Neurological: She is alert.   Skin: " Skin is not diaphoretic.   Nursing note and vitals reviewed.        Results for orders placed or performed in visit on 05/26/22   POCT COVID-19 Rapid Screening   Result Value Ref Range    POC Rapid COVID Negative Negative     Acceptable Yes        Assessment:       1. Acute sinusitis, recurrence not specified, unspecified location    2. Cough    3. Allergic rhinitis, unspecified seasonality, unspecified trigger          Plan:         Acute sinusitis, recurrence not specified, unspecified location  -     azelastine (ASTELIN) 137 mcg (0.1 %) nasal spray; 1 spray (137 mcg total) by Nasal route 2 (two) times daily.  Dispense: 30 mL; Refill: 1    Cough  -     POCT COVID-19 Rapid Screening  -     benzonatate (TESSALON PERLES) 100 MG capsule; Take 1 capsule (100 mg total) by mouth 3 (three) times daily as needed for Cough.  Dispense: 30 capsule; Refill: 1    Allergic rhinitis, unspecified seasonality, unspecified trigger  -     azelastine (ASTELIN) 137 mcg (0.1 %) nasal spray; 1 spray (137 mcg total) by Nasal route 2 (two) times daily.  Dispense: 30 mL; Refill: 1

## 2022-08-08 ENCOUNTER — TELEPHONE (OUTPATIENT)
Dept: SPORTS MEDICINE | Facility: CLINIC | Age: 77
End: 2022-08-08
Payer: MEDICARE

## 2022-08-08 NOTE — TELEPHONE ENCOUNTER
Called and spoke with patient to let her know that Hernandez is no longer with us and schedule her with Dr. Woodson for next Tuesday 08/16/22 in the morning.

## 2022-08-16 ENCOUNTER — OFFICE VISIT (OUTPATIENT)
Dept: SPORTS MEDICINE | Facility: CLINIC | Age: 77
End: 2022-08-16
Payer: MEDICARE

## 2022-08-16 VITALS
DIASTOLIC BLOOD PRESSURE: 84 MMHG | SYSTOLIC BLOOD PRESSURE: 127 MMHG | WEIGHT: 155 LBS | HEART RATE: 81 BPM | HEIGHT: 66 IN | BODY MASS INDEX: 24.91 KG/M2

## 2022-08-16 DIAGNOSIS — M19.011 PRIMARY OSTEOARTHRITIS OF RIGHT SHOULDER: Primary | ICD-10-CM

## 2022-08-16 PROCEDURE — 99213 PR OFFICE/OUTPT VISIT, EST, LEVL III, 20-29 MIN: ICD-10-PCS | Mod: 25,S$GLB,, | Performed by: ORTHOPAEDIC SURGERY

## 2022-08-16 PROCEDURE — 1159F MED LIST DOCD IN RCRD: CPT | Mod: CPTII,S$GLB,, | Performed by: ORTHOPAEDIC SURGERY

## 2022-08-16 PROCEDURE — 20610 LARGE JOINT ASPIRATION/INJECTION: R GLENOHUMERAL: ICD-10-PCS | Mod: RT,S$GLB,, | Performed by: ORTHOPAEDIC SURGERY

## 2022-08-16 PROCEDURE — 1101F PR PT FALLS ASSESS DOC 0-1 FALLS W/OUT INJ PAST YR: ICD-10-PCS | Mod: CPTII,S$GLB,, | Performed by: ORTHOPAEDIC SURGERY

## 2022-08-16 PROCEDURE — 1101F PT FALLS ASSESS-DOCD LE1/YR: CPT | Mod: CPTII,S$GLB,, | Performed by: ORTHOPAEDIC SURGERY

## 2022-08-16 PROCEDURE — 3079F DIAST BP 80-89 MM HG: CPT | Mod: CPTII,S$GLB,, | Performed by: ORTHOPAEDIC SURGERY

## 2022-08-16 PROCEDURE — 3079F PR MOST RECENT DIASTOLIC BLOOD PRESSURE 80-89 MM HG: ICD-10-PCS | Mod: CPTII,S$GLB,, | Performed by: ORTHOPAEDIC SURGERY

## 2022-08-16 PROCEDURE — 20610 DRAIN/INJ JOINT/BURSA W/O US: CPT | Mod: RT,S$GLB,, | Performed by: ORTHOPAEDIC SURGERY

## 2022-08-16 PROCEDURE — 3288F PR FALLS RISK ASSESSMENT DOCUMENTED: ICD-10-PCS | Mod: CPTII,S$GLB,, | Performed by: ORTHOPAEDIC SURGERY

## 2022-08-16 PROCEDURE — 99999 PR PBB SHADOW E&M-EST. PATIENT-LVL III: ICD-10-PCS | Mod: PBBFAC,,, | Performed by: ORTHOPAEDIC SURGERY

## 2022-08-16 PROCEDURE — 3074F PR MOST RECENT SYSTOLIC BLOOD PRESSURE < 130 MM HG: ICD-10-PCS | Mod: CPTII,S$GLB,, | Performed by: ORTHOPAEDIC SURGERY

## 2022-08-16 PROCEDURE — 99213 OFFICE O/P EST LOW 20 MIN: CPT | Mod: 25,S$GLB,, | Performed by: ORTHOPAEDIC SURGERY

## 2022-08-16 PROCEDURE — 1159F PR MEDICATION LIST DOCUMENTED IN MEDICAL RECORD: ICD-10-PCS | Mod: CPTII,S$GLB,, | Performed by: ORTHOPAEDIC SURGERY

## 2022-08-16 PROCEDURE — 3074F SYST BP LT 130 MM HG: CPT | Mod: CPTII,S$GLB,, | Performed by: ORTHOPAEDIC SURGERY

## 2022-08-16 PROCEDURE — 1125F PR PAIN SEVERITY QUANTIFIED, PAIN PRESENT: ICD-10-PCS | Mod: CPTII,S$GLB,, | Performed by: ORTHOPAEDIC SURGERY

## 2022-08-16 PROCEDURE — 1125F AMNT PAIN NOTED PAIN PRSNT: CPT | Mod: CPTII,S$GLB,, | Performed by: ORTHOPAEDIC SURGERY

## 2022-08-16 PROCEDURE — 99999 PR PBB SHADOW E&M-EST. PATIENT-LVL III: CPT | Mod: PBBFAC,,, | Performed by: ORTHOPAEDIC SURGERY

## 2022-08-16 PROCEDURE — 3288F FALL RISK ASSESSMENT DOCD: CPT | Mod: CPTII,S$GLB,, | Performed by: ORTHOPAEDIC SURGERY

## 2022-08-16 RX ADMIN — TRIAMCINOLONE ACETONIDE 40 MG: 40 INJECTION, SUSPENSION INTRA-ARTICULAR; INTRAMUSCULAR at 07:08

## 2022-08-16 NOTE — PROGRESS NOTES
CC: Right shoulder pain     77 y.o. Female presents to clinic today for follow-up of right shoulder pain. She has been seen and treated by Hernandez Min PA-C for glenohumeral arthritis with CSI. She has had multiple injections in the past and reports relief that can last up to two months at a time. Her last injection was in May and she is here today to discuss a possible repeat injection. She states she is not interested in surgery at this time.    PMHx notable for HTN.   Negative for tobacco.   Negative for diabetes.    Pain Score:   7    PAST MEDICAL HISTORY:   No past medical history on file.    PAST SURGICAL HISTORY:  No past surgical history on file.    FAMILY HISTORY:  No family history on file.    MEDICATIONS:    Current Outpatient Medications:     acetaminophen-codeine 300-30mg (TYLENOL #3) 300-30 mg Tab, Take 1 tablet by mouth 2 (two) times daily as needed. for pain, Disp: , Rfl: 0    amLODIPine (NORVASC) 10 MG tablet, Take 10 mg by mouth once daily., Disp: , Rfl: 1    azelastine (ASTELIN) 137 mcg (0.1 %) nasal spray, 1 spray (137 mcg total) by Nasal route 2 (two) times daily., Disp: 30 mL, Rfl: 1    benzonatate (TESSALON PERLES) 100 MG capsule, Take 1 capsule (100 mg total) by mouth 3 (three) times daily as needed for Cough., Disp: 30 capsule, Rfl: 1    benzonatate (TESSALON) 100 MG capsule, TK ONE C PO Q 8 H PRF COUGH, Disp: , Rfl:     diclofenac sodium (VOLTAREN) 1 % Gel, APPLY 2 GRAMS EXTERNALLY TO THE AFFECTED AREA FOUR TIMES DAILY AS NEEDED, Disp: 100 g, Rfl: 4    dorzolamide-timolol 2-0.5% (COSOPT) 22.3-6.8 mg/mL ophthalmic solution, INSTILL 1 DROP INTO LEFT EYE TWICE DAILY, Disp: , Rfl:     esomeprazole (NEXIUM) 40 MG capsule, , Disp: , Rfl:     FLUAD QUAD 2020-21,65Y UP,,PF, 60 mcg (15 mcg x 4)/0.5 mL Syrg, ADM 0.5ML IM UTD, Disp: , Rfl:     fluticasone propionate (FLONASE) 50 mcg/actuation nasal spray, 1 spray (50 mcg total) by Each Nostril route once daily. (Patient not taking: Reported  "on 5/26/2022), Disp: 11.1 mL, Rfl: 0    gabapentin (NEURONTIN) 300 MG capsule, Take 300 mg by mouth 2 (two) times daily., Disp: , Rfl: 0    levocetirizine (XYZAL) 5 MG tablet, Take 1 tablet (5 mg total) by mouth every evening., Disp: 30 tablet, Rfl: 11    traMADoL (ULTRAM) 50 mg tablet, Take 1 tablet (50 mg total) by mouth every 12 (twelve) hours as needed for Pain. (Patient not taking: Reported on 5/26/2022), Disp: 60 tablet, Rfl: 5    ALLERGIES:  Review of patient's allergies indicates:   Allergen Reactions    Percodan [oxycodone-aspirin]     Tramadol Other (See Comments)     Insomnia        REVIEW OF SYSTEMS:  Constitution: Negative. Negative for chills, fever and night sweats.    Hematologic/Lymphatic: Negative for bleeding problem. Does not bruise/bleed easily.   Skin: Negative for dry skin, itching and rash.   Musculoskeletal: Negative for falls. Positive for right shoulder pain and muscle weakness.     All other review of symptoms were reviewed and found to be noncontributory.     PHYSICAL EXAMINATION:  Vitals:  /84   Pulse 81   Ht 5' 6" (1.676 m)   Wt 70.3 kg (155 lb)   BMI 25.02 kg/m²    General: Well-developed well-nourished 77 y.o. femalein no acute distress   Cardiovascular: Regular rhythm by palpation of distal pulse, normal color and temperature, no concerning varicosities on symptomatic side   Lungs: No labored breathing or wheezing appreciated   Neuro: Alert and oriented ×3   Psychiatric: well oriented to person, place and time, demonstrates normal mood and affect   Skin: No rashes, lesions or ulcers, normal temperature, turgor, and texture on uninvolved extremity    Ortho/SPM Exam  Examination of the right shoulder again demonstrates no swelling.  Active forward elevation to 130° compared to 150° on the left side.  Passive forward elevation the right side also to 150°.  Active and passive external rotation to about 30°, painful.  4/5 forward flexion strength.  4+/5 external rotation " Vitals: WNL  Gen: AAOx3, NAD, sitting comfortably in stretcher  Head: ncat, perrla, eomi b/l  Neck: supple, no lymphadenopathy, no midline deviation  Heart: rrr, no m/r/g  Lungs: b/l rhonchi diffuse  Abd: soft, nontender, non-distended, no rebound or guarding  Ext: no clubbing/cyanosis/edema  Neuro: sensation and muscle strength intact b/l, steady gait strength.   Belly press negative.  Negative Spurling's exam.  No midline neck tenderness. Mild TTP over right trap.    IMAGING:  Xrays including AP, Outlet and Axillary Lateral of RIGHT shoulder are ordered / images reviewed by me:   No fracture or acute change appreciated. Moderate to severe glenohumeral degenerative changes. Mild to moderate AC joint arthritis. Normal acromiohumeral distance.       ASSESSMENT:      ICD-10-CM ICD-9-CM   1. Primary osteoarthritis of right shoulder  M19.011 715.11       PLAN:     -Findings and treatment options were discussed with the patient  -CSI of right shoulder performed today in clinic  -RTC PRN  -All questions answered      Large Joint Aspiration/Injection: R glenohumeral    Date/Time: 8/16/2022 7:45 AM  Performed by: GAEL Woodson MD  Authorized by: GAEL Woodson MD     Consent Done?:  Yes (Verbal)  Indications:  Pain  Site marked: the procedure site was marked    Timeout: prior to procedure the correct patient, procedure, and site was verified    Prep: patient was prepped and draped in usual sterile fashion      Local anesthesia used?: Yes    Local anesthetic:  Co-phenylcaine spray (0.2% Naropin)  Anesthetic total (ml):  4      Details:  Needle Size:  22 G  Approach:  Superior  Location:  Shoulder  Site:  R glenohumeral  Medications:  40 mg triamcinolone acetonide 40 mg/mL  Patient tolerance:  Patient tolerated the procedure well with no immediate complications

## 2022-09-05 RX ORDER — TRIAMCINOLONE ACETONIDE 40 MG/ML
40 INJECTION, SUSPENSION INTRA-ARTICULAR; INTRAMUSCULAR
Status: DISCONTINUED | OUTPATIENT
Start: 2022-08-16 | End: 2022-09-05 | Stop reason: HOSPADM

## 2022-10-26 ENCOUNTER — PATIENT MESSAGE (OUTPATIENT)
Dept: SPORTS MEDICINE | Facility: CLINIC | Age: 77
End: 2022-10-26
Payer: MEDICARE

## 2022-11-03 ENCOUNTER — HOSPITAL ENCOUNTER (OUTPATIENT)
Dept: RADIOLOGY | Facility: HOSPITAL | Age: 77
Discharge: HOME | End: 2022-11-03
Attending: ORTHOPAEDIC SURGERY
Payer: MEDICARE

## 2022-11-03 ENCOUNTER — OFFICE VISIT (OUTPATIENT)
Dept: DERMATOLOGY | Facility: CLINIC | Age: 77
End: 2022-11-03
Payer: MEDICARE

## 2022-11-03 DIAGNOSIS — L82.1 SEBORRHEIC KERATOSES: Primary | ICD-10-CM

## 2022-11-03 DIAGNOSIS — Z41.1 ENCOUNTER FOR COSMETIC PROCEDURE: ICD-10-CM

## 2022-11-03 DIAGNOSIS — M25.511 RIGHT SHOULDER PAIN, UNSPECIFIED CHRONICITY: ICD-10-CM

## 2022-11-03 DIAGNOSIS — Z76.89 ENCOUNTER FOR SKIN CARE: ICD-10-CM

## 2022-11-03 PROCEDURE — 73030 XR SHOULDER COMPLETE 2 OR MORE VIEWS RIGHT: ICD-10-PCS | Mod: 26,RT,, | Performed by: RADIOLOGY

## 2022-11-03 PROCEDURE — 1160F RVW MEDS BY RX/DR IN RCRD: CPT | Mod: CPTII,S$GLB,, | Performed by: DERMATOLOGY

## 2022-11-03 PROCEDURE — 3288F PR FALLS RISK ASSESSMENT DOCUMENTED: ICD-10-PCS | Mod: CPTII,S$GLB,, | Performed by: DERMATOLOGY

## 2022-11-03 PROCEDURE — 1101F PR PT FALLS ASSESS DOC 0-1 FALLS W/OUT INJ PAST YR: ICD-10-PCS | Mod: CPTII,S$GLB,, | Performed by: DERMATOLOGY

## 2022-11-03 PROCEDURE — 3288F FALL RISK ASSESSMENT DOCD: CPT | Mod: CPTII,S$GLB,, | Performed by: DERMATOLOGY

## 2022-11-03 PROCEDURE — 1160F PR REVIEW ALL MEDS BY PRESCRIBER/CLIN PHARMACIST DOCUMENTED: ICD-10-PCS | Mod: CPTII,S$GLB,, | Performed by: DERMATOLOGY

## 2022-11-03 PROCEDURE — 1126F AMNT PAIN NOTED NONE PRSNT: CPT | Mod: CPTII,S$GLB,, | Performed by: DERMATOLOGY

## 2022-11-03 PROCEDURE — 73030 X-RAY EXAM OF SHOULDER: CPT | Mod: 26,RT,, | Performed by: RADIOLOGY

## 2022-11-03 PROCEDURE — 73030 X-RAY EXAM OF SHOULDER: CPT | Mod: TC,RT

## 2022-11-03 PROCEDURE — 99203 OFFICE O/P NEW LOW 30 MIN: CPT | Mod: S$GLB,,, | Performed by: DERMATOLOGY

## 2022-11-03 PROCEDURE — 99999 PR PBB SHADOW E&M-EST. PATIENT-LVL III: CPT | Mod: PBBFAC,,, | Performed by: DERMATOLOGY

## 2022-11-03 PROCEDURE — 1126F PR PAIN SEVERITY QUANTIFIED, NO PAIN PRESENT: ICD-10-PCS | Mod: CPTII,S$GLB,, | Performed by: DERMATOLOGY

## 2022-11-03 PROCEDURE — 1159F MED LIST DOCD IN RCRD: CPT | Mod: CPTII,S$GLB,, | Performed by: DERMATOLOGY

## 2022-11-03 PROCEDURE — 1159F PR MEDICATION LIST DOCUMENTED IN MEDICAL RECORD: ICD-10-PCS | Mod: CPTII,S$GLB,, | Performed by: DERMATOLOGY

## 2022-11-03 PROCEDURE — 99999 PR PBB SHADOW E&M-EST. PATIENT-LVL III: ICD-10-PCS | Mod: PBBFAC,,, | Performed by: DERMATOLOGY

## 2022-11-03 PROCEDURE — 99203 PR OFFICE/OUTPT VISIT, NEW, LEVL III, 30-44 MIN: ICD-10-PCS | Mod: S$GLB,,, | Performed by: DERMATOLOGY

## 2022-11-03 PROCEDURE — 1101F PT FALLS ASSESS-DOCD LE1/YR: CPT | Mod: CPTII,S$GLB,, | Performed by: DERMATOLOGY

## 2022-11-03 NOTE — PATIENT INSTRUCTIONS
Avoid hot water   Moisturize with coconut oil   Mild soap like Dove     Glytone products for aging skin on arms       Patient instructed in importance in daily sun protection. Sun avoidance and topical protection discussed.     Patient encouraged to wear hat for all outdoor exposure.     Also discussed sun protective clothing.

## 2022-11-03 NOTE — PROGRESS NOTES
Subjective:       Patient ID:  Elena Willingham is a 77 y.o. female who presents for   Chief Complaint   Patient presents with    Skin Check     UBSE     HPI    Review of Systems   Constitutional: Negative.    HENT: Negative.     Respiratory: Negative.     Musculoskeletal: Negative.       Objective:    Physical Exam   Constitutional: She appears well-developed and well-nourished.   Neurological: She is alert and oriented to person, place, and time.   Psychiatric: She has a normal mood and affect.   Skin:                    Diagram Legend     Erythematous scaling macule/papule c/w actinic keratosis       Vascular papule c/w angioma      Pigmented verrucoid papule/plaque c/w seborrheic keratosis      Yellow umbilicated papule c/w sebaceous hyperplasia      Irregularly shaped tan macule c/w lentigo     1-2 mm smooth white papules consistent with Milia      Movable subcutaneous cyst with punctum c/w epidermal inclusion cyst      Subcutaneous movable cyst c/w pilar cyst      Firm pink to brown papule c/w dermatofibroma      Pedunculated fleshy papule(s) c/w skin tag(s)      Evenly pigmented macule c/w junctional nevus     Mildly variegated pigmented, slightly irregular-bordered macule c/w mildly atypical nevus      Flesh colored to evenly pigmented papule c/w intradermal nevus       Pink pearly papule/plaque c/w basal cell carcinoma      Erythematous hyperkeratotic cursted plaque c/w SCC      Surgical scar with no sign of skin cancer recurrence      Open and closed comedones      Inflammatory papules and pustules      Verrucoid papule consistent consistent with wart     Erythematous eczematous patches and plaques     Dystrophic onycholytic nail with subungual debris c/w onychomycosis     Umbilicated papule    Erythematous-base heme-crusted tan verrucoid plaque consistent with inflamed seborrheic keratosis     Erythematous Silvery Scaling Plaque c/w Psoriasis     See annotation      Assessment / Plan:         Seborrheic keratoses  Discussed with patient the benign nature of these lesions and that no treatment is indicated.  Chronic nature of this condition discussed with patient.  Discussed with patient to use organic coconut oil or pure shea butter at least daily for moisturization for the body and organic jojoba oil at least daily for the face.  Sandals or slippers at home as not to slide around and risk fall on non carpeted floors if applied to the soles.    Encounter for skin care  Good skin care regimen discussed including limiting to one bath or shower per day, using lukewarm water with mild soap and moisturization to skin once to twice daily.  Consider glycerin bar soap or Dove.  Consider organic coconut oil.  No hot water bathing reviewed.    Encounter for cosmetic procedure  Recommend Glytone products for proven effects on collagen production with alpha hydroxy products.  Patient instructed in importance in daily sun protection. Sun avoidance and topical protection discussed.     Patient encouraged to wear hat for all outdoor exposure.     Also discussed sun protective clothing.           Follow up if symptoms worsen or fail to improve.

## 2022-11-07 ENCOUNTER — OFFICE VISIT (OUTPATIENT)
Dept: SPORTS MEDICINE | Facility: CLINIC | Age: 77
End: 2022-11-07
Payer: MEDICARE

## 2022-11-07 VITALS
HEIGHT: 66 IN | BODY MASS INDEX: 24.59 KG/M2 | SYSTOLIC BLOOD PRESSURE: 123 MMHG | DIASTOLIC BLOOD PRESSURE: 79 MMHG | WEIGHT: 153 LBS | HEART RATE: 81 BPM

## 2022-11-07 DIAGNOSIS — M19.011 OSTEOARTHRITIS OF RIGHT GLENOHUMERAL JOINT: Primary | ICD-10-CM

## 2022-11-07 PROCEDURE — 99999 PR PBB SHADOW E&M-EST. PATIENT-LVL III: ICD-10-PCS | Mod: PBBFAC,,, | Performed by: ORTHOPAEDIC SURGERY

## 2022-11-07 PROCEDURE — 3078F DIAST BP <80 MM HG: CPT | Mod: CPTII,S$GLB,, | Performed by: ORTHOPAEDIC SURGERY

## 2022-11-07 PROCEDURE — 3074F PR MOST RECENT SYSTOLIC BLOOD PRESSURE < 130 MM HG: ICD-10-PCS | Mod: CPTII,S$GLB,, | Performed by: ORTHOPAEDIC SURGERY

## 2022-11-07 PROCEDURE — 1159F PR MEDICATION LIST DOCUMENTED IN MEDICAL RECORD: ICD-10-PCS | Mod: CPTII,S$GLB,, | Performed by: ORTHOPAEDIC SURGERY

## 2022-11-07 PROCEDURE — 3288F FALL RISK ASSESSMENT DOCD: CPT | Mod: CPTII,S$GLB,, | Performed by: ORTHOPAEDIC SURGERY

## 2022-11-07 PROCEDURE — 99214 PR OFFICE/OUTPT VISIT, EST, LEVL IV, 30-39 MIN: ICD-10-PCS | Mod: S$GLB,,, | Performed by: ORTHOPAEDIC SURGERY

## 2022-11-07 PROCEDURE — 1125F PR PAIN SEVERITY QUANTIFIED, PAIN PRESENT: ICD-10-PCS | Mod: CPTII,S$GLB,, | Performed by: ORTHOPAEDIC SURGERY

## 2022-11-07 PROCEDURE — 3288F PR FALLS RISK ASSESSMENT DOCUMENTED: ICD-10-PCS | Mod: CPTII,S$GLB,, | Performed by: ORTHOPAEDIC SURGERY

## 2022-11-07 PROCEDURE — 99214 OFFICE O/P EST MOD 30 MIN: CPT | Mod: S$GLB,,, | Performed by: ORTHOPAEDIC SURGERY

## 2022-11-07 PROCEDURE — 1101F PT FALLS ASSESS-DOCD LE1/YR: CPT | Mod: CPTII,S$GLB,, | Performed by: ORTHOPAEDIC SURGERY

## 2022-11-07 PROCEDURE — 1159F MED LIST DOCD IN RCRD: CPT | Mod: CPTII,S$GLB,, | Performed by: ORTHOPAEDIC SURGERY

## 2022-11-07 PROCEDURE — 1125F AMNT PAIN NOTED PAIN PRSNT: CPT | Mod: CPTII,S$GLB,, | Performed by: ORTHOPAEDIC SURGERY

## 2022-11-07 PROCEDURE — 3078F PR MOST RECENT DIASTOLIC BLOOD PRESSURE < 80 MM HG: ICD-10-PCS | Mod: CPTII,S$GLB,, | Performed by: ORTHOPAEDIC SURGERY

## 2022-11-07 PROCEDURE — 99999 PR PBB SHADOW E&M-EST. PATIENT-LVL III: CPT | Mod: PBBFAC,,, | Performed by: ORTHOPAEDIC SURGERY

## 2022-11-07 PROCEDURE — 1101F PR PT FALLS ASSESS DOC 0-1 FALLS W/OUT INJ PAST YR: ICD-10-PCS | Mod: CPTII,S$GLB,, | Performed by: ORTHOPAEDIC SURGERY

## 2022-11-07 PROCEDURE — 3074F SYST BP LT 130 MM HG: CPT | Mod: CPTII,S$GLB,, | Performed by: ORTHOPAEDIC SURGERY

## 2022-11-07 NOTE — PROGRESS NOTES
CC: Right shoulder pain     77 y.o. Female presents to clinic today for follow-up of right shoulder pain. She was last seen 8/16/22 for a right glenohumeral injection CSI. She states she had 50% relief for 2 weeks. This is less relief and shorter duration then her prior shoulder injections. She is interested in discussing surgical options for her right shoulder.  This is a daily quality of life issue for her.  She is right-hand dominant.  No neck or radicular symptoms reported at this time.  Pain is localized to the shoulder joint.    From previous:  She has been seen and treated by Hernandez Min PA-C for glenohumeral arthritis with CSI. She has had multiple injections in the past and reports relief that can last up to two months at a time. Her last injection was in May and she is here today to discuss a possible repeat injection. She states she is not interested in surgery at this time.    PMHx notable for HTN.   Negative for tobacco.   Negative for diabetes.    Pain Score:   3    PAST MEDICAL HISTORY:   History reviewed. No pertinent past medical history.    PAST SURGICAL HISTORY:  History reviewed. No pertinent surgical history.    FAMILY HISTORY:  History reviewed. No pertinent family history.    MEDICATIONS:    Current Outpatient Medications:     acetaminophen-codeine 300-30mg (TYLENOL #3) 300-30 mg Tab, Take 1 tablet by mouth 2 (two) times daily as needed. for pain, Disp: , Rfl: 0    amLODIPine (NORVASC) 10 MG tablet, Take 10 mg by mouth once daily., Disp: , Rfl: 1    azelastine (ASTELIN) 137 mcg (0.1 %) nasal spray, 1 spray (137 mcg total) by Nasal route 2 (two) times daily., Disp: 30 mL, Rfl: 1    benzonatate (TESSALON PERLES) 100 MG capsule, Take 1 capsule (100 mg total) by mouth 3 (three) times daily as needed for Cough., Disp: 30 capsule, Rfl: 1    benzonatate (TESSALON) 100 MG capsule, TK ONE C PO Q 8 H PRF COUGH, Disp: , Rfl:     diclofenac sodium (VOLTAREN) 1 % Gel, APPLY 2 GRAMS EXTERNALLY TO THE  "AFFECTED AREA FOUR TIMES DAILY AS NEEDED, Disp: 100 g, Rfl: 4    dorzolamide-timolol 2-0.5% (COSOPT) 22.3-6.8 mg/mL ophthalmic solution, INSTILL 1 DROP INTO LEFT EYE TWICE DAILY, Disp: , Rfl:     esomeprazole (NEXIUM) 40 MG capsule, , Disp: , Rfl:     FLUAD QUAD 2020-21,65Y UP,,PF, 60 mcg (15 mcg x 4)/0.5 mL Syrg, ADM 0.5ML IM UTD, Disp: , Rfl:     fluticasone propionate (FLONASE) 50 mcg/actuation nasal spray, 1 spray (50 mcg total) by Each Nostril route once daily., Disp: 11.1 mL, Rfl: 0    gabapentin (NEURONTIN) 300 MG capsule, Take 300 mg by mouth 2 (two) times daily., Disp: , Rfl: 0    levocetirizine (XYZAL) 5 MG tablet, Take 1 tablet (5 mg total) by mouth every evening., Disp: 30 tablet, Rfl: 11    traMADoL (ULTRAM) 50 mg tablet, Take 1 tablet (50 mg total) by mouth every 12 (twelve) hours as needed for Pain., Disp: 60 tablet, Rfl: 5    ALLERGIES:  Review of patient's allergies indicates:   Allergen Reactions    Percodan [oxycodone-aspirin]     Tramadol Other (See Comments)     Insomnia     REVIEW OF SYSTEMS:  Constitution: Negative. Negative for chills, fever and night sweats.    Hematologic/Lymphatic: Negative for bleeding problem. Does not bruise/bleed easily.   Skin: Negative for dry skin, itching and rash.   Musculoskeletal: Negative for falls. Positive for right shoulder pain and muscle weakness.     All other review of symptoms were reviewed and found to be noncontributory.     PHYSICAL EXAMINATION:  Vitals:  /79   Pulse 81   Ht 5' 6" (1.676 m)   Wt 69.4 kg (153 lb)   BMI 24.69 kg/m²    General: Well-developed well-nourished 77 y.o. femalein no acute distress   Cardiovascular: Regular rhythm by palpation of distal pulse, normal color and temperature, no concerning varicosities on symptomatic side   Lungs: No labored breathing or wheezing appreciated   Neuro: Alert and oriented ×3   Psychiatric: well oriented to person, place and time, demonstrates normal mood and affect   Skin: No rashes, " lesions or ulcers, normal temperature, turgor, and texture on uninvolved extremity    Ortho/SPM Exam  Examination of the right shoulder again demonstrates no significant swelling.  Pain to palpation over the proximal humerus, shoulder joint and trapezius.  Active forward elevation to 90° compared to 150° on the left side.  Passive forward elevation the right side also to 110°, limited by pain and stiffness.  Crepitus on exam.  Active and passive external rotation to about 20°, painful.  4-/5 forward flexion strength.  4+/5 external rotation strength.   Belly press negative. Negative Spurling's exam.  No midline neck tenderness.     IMAGING:  Xrays including AP, Outlet and Axillary Lateral of RIGHT shoulder are ordered / images reviewed by me:   Significant DJD with osteophytosis and marked narrowing of the glenohumeral joint space.  Medial glenoid erosion with dysplasia    ASSESSMENT:      ICD-10-CM ICD-9-CM   1. Osteoarthritis of right glenohumeral joint  M19.011 715.91       PLAN:     -Findings and treatment options were discussed with the patient, both operative and non operative.  The patient continues to have significant pain and functional limitation despite extensive prior conservative treatment.  She wishes to proceed with more definitive management of the shoulder at this time.  We discussed the details of arthroplasty.  Reverse shoulder arthroplasty would be my recommendation based upon her age and degree of glenoid dysplasia.  The details of that were discussed include the expected postop rehab and recovery course.  She does wish to proceed.  -Plan for a Right reverse shoulder arthroplasty.  Preop CT scan for planning. Tornier with likely glenoid augment.   -Clearance -  PCP and dental  -RTC for preoperative appointment   -All questions answered      Informed Consent:    The details of the surgical procedure were explained, including the location of probable incisions and a description of possible  hardware and/or grafts to be used. Alternatives to both operative and non-operative options with associated risks and benefits were discussed. The patient understands the likely convalescence after surgery and, in particular, the expected postop rehab and recovery course. The outlined risks and potential complications of the proposed procedure include but are not limited to: infection, poor wound healing, scarring, deformity, stiffness, swelling, continued or recurrent pain, instability, hardware or prosthetic failure if implanted, symptomatic hardware requiring removal, weakness, neurovascular injury, numbness, chronic regional pain disorder, tissue nonhealing/irreparability/retear, subsequent contralateral limb injury or pathology, chondral injury, arthritis, fracture, blood clot formation, inability to return to previous level of activity, anesthetic or regional block complication up to death, need for additional procedure as indicated intraoperatively, and potential need for further surgery.    The patient was also informed and understands that the risks of surgery are greater for patients with a current condition or history of heart disease, obesity, clotting disorders, recurrent infections, steroid use, current or past smoking, and factors such as sedentary lifestyle and noncompliance with medications, therapy or follow-up. The degree of the increased risk is hard to estimate with any degree of precision. If applicable, smoking cessation was discussed.     All questions were answered. The patient has verbalized understanding of these issues and wishes to proceed with the surgery as discussed.

## 2022-11-08 ENCOUNTER — PATIENT MESSAGE (OUTPATIENT)
Dept: SPORTS MEDICINE | Facility: CLINIC | Age: 77
End: 2022-11-08
Payer: MEDICARE

## 2022-11-08 DIAGNOSIS — M19.011 PRIMARY OSTEOARTHRITIS OF RIGHT SHOULDER: Primary | ICD-10-CM

## 2022-11-08 DIAGNOSIS — M19.90 SENILE ARTHRITIS: ICD-10-CM

## 2022-11-14 ENCOUNTER — PATIENT MESSAGE (OUTPATIENT)
Dept: SPORTS MEDICINE | Facility: CLINIC | Age: 77
End: 2022-11-14
Payer: MEDICARE

## 2022-11-21 ENCOUNTER — HOSPITAL ENCOUNTER (OUTPATIENT)
Dept: RADIOLOGY | Facility: HOSPITAL | Age: 77
Discharge: HOME | End: 2022-11-21
Attending: STUDENT IN AN ORGANIZED HEALTH CARE EDUCATION/TRAINING PROGRAM
Payer: MEDICARE

## 2022-11-21 DIAGNOSIS — M19.011 OSTEOARTHRITIS OF RIGHT GLENOHUMERAL JOINT: ICD-10-CM

## 2022-11-21 PROCEDURE — 73200 CT SHOULDER WITHOUT CONTRAST RIGHT: ICD-10-PCS | Mod: 26,RT,, | Performed by: RADIOLOGY

## 2022-11-21 PROCEDURE — 73200 CT UPPER EXTREMITY W/O DYE: CPT | Mod: 26,RT,, | Performed by: RADIOLOGY

## 2022-11-21 PROCEDURE — 73200 CT UPPER EXTREMITY W/O DYE: CPT | Mod: TC,RT

## 2022-11-23 ENCOUNTER — PATIENT MESSAGE (OUTPATIENT)
Dept: SPORTS MEDICINE | Facility: CLINIC | Age: 77
End: 2022-11-23
Payer: MEDICARE

## 2022-12-02 ENCOUNTER — TELEPHONE (OUTPATIENT)
Dept: SPORTS MEDICINE | Facility: CLINIC | Age: 77
End: 2022-12-02
Payer: MEDICARE

## 2022-12-02 ENCOUNTER — OFFICE VISIT (OUTPATIENT)
Dept: SPORTS MEDICINE | Facility: CLINIC | Age: 77
End: 2022-12-02
Payer: MEDICARE

## 2022-12-02 VITALS
DIASTOLIC BLOOD PRESSURE: 86 MMHG | WEIGHT: 153 LBS | BODY MASS INDEX: 24.59 KG/M2 | HEART RATE: 105 BPM | SYSTOLIC BLOOD PRESSURE: 128 MMHG | HEIGHT: 66 IN

## 2022-12-02 DIAGNOSIS — M75.21 BICIPITAL TENDINITIS OF RIGHT SHOULDER: ICD-10-CM

## 2022-12-02 DIAGNOSIS — M25.511 RIGHT SHOULDER PAIN, UNSPECIFIED CHRONICITY: ICD-10-CM

## 2022-12-02 DIAGNOSIS — M19.011 PRIMARY OSTEOARTHRITIS OF RIGHT SHOULDER: Primary | ICD-10-CM

## 2022-12-02 PROCEDURE — 3074F PR MOST RECENT SYSTOLIC BLOOD PRESSURE < 130 MM HG: ICD-10-PCS | Mod: CPTII,S$GLB,, | Performed by: PHYSICIAN ASSISTANT

## 2022-12-02 PROCEDURE — 3079F DIAST BP 80-89 MM HG: CPT | Mod: CPTII,S$GLB,, | Performed by: PHYSICIAN ASSISTANT

## 2022-12-02 PROCEDURE — 1126F AMNT PAIN NOTED NONE PRSNT: CPT | Mod: CPTII,S$GLB,, | Performed by: PHYSICIAN ASSISTANT

## 2022-12-02 PROCEDURE — 99999 PR PBB SHADOW E&M-EST. PATIENT-LVL IV: ICD-10-PCS | Mod: PBBFAC,,, | Performed by: PHYSICIAN ASSISTANT

## 2022-12-02 PROCEDURE — 99999 PR PBB SHADOW E&M-EST. PATIENT-LVL IV: CPT | Mod: PBBFAC,,, | Performed by: PHYSICIAN ASSISTANT

## 2022-12-02 PROCEDURE — 99499 NO LOS: ICD-10-PCS | Mod: S$GLB,,, | Performed by: PHYSICIAN ASSISTANT

## 2022-12-02 PROCEDURE — 3074F SYST BP LT 130 MM HG: CPT | Mod: CPTII,S$GLB,, | Performed by: PHYSICIAN ASSISTANT

## 2022-12-02 PROCEDURE — 1126F PR PAIN SEVERITY QUANTIFIED, NO PAIN PRESENT: ICD-10-PCS | Mod: CPTII,S$GLB,, | Performed by: PHYSICIAN ASSISTANT

## 2022-12-02 PROCEDURE — 99499 UNLISTED E&M SERVICE: CPT | Mod: S$GLB,,, | Performed by: PHYSICIAN ASSISTANT

## 2022-12-02 PROCEDURE — 3079F PR MOST RECENT DIASTOLIC BLOOD PRESSURE 80-89 MM HG: ICD-10-PCS | Mod: CPTII,S$GLB,, | Performed by: PHYSICIAN ASSISTANT

## 2022-12-02 RX ORDER — OXYCODONE HYDROCHLORIDE 5 MG/1
TABLET ORAL
Qty: 28 TABLET | Refills: 0 | Status: SHIPPED | OUTPATIENT
Start: 2022-12-02 | End: 2022-12-05

## 2022-12-02 RX ORDER — ONDANSETRON 4 MG/1
4 TABLET, FILM COATED ORAL EVERY 8 HOURS PRN
Qty: 30 TABLET | Refills: 0 | Status: SHIPPED | OUTPATIENT
Start: 2022-12-02

## 2022-12-02 RX ORDER — ASPIRIN 81 MG/1
TABLET ORAL
Qty: 28 TABLET | Refills: 0 | Status: SHIPPED | OUTPATIENT
Start: 2022-12-02

## 2022-12-02 NOTE — H&P
"Elena Willingham  is here for a completion of her perioperative paperwork. she  Is scheduled to undergo Right reverse shoulder arthroplasty on 12/7/2022.      She does need clearance for this procedure.     Per PCP, "cleared for surgery"  Per dental, cleared for surgery  Scanned under media tab    Risks, indications and benefits of the surgical procedure were discussed with the patient. All questions with regard to surgery, rehab, expected return to functional activities, activities of daily living and recreational endeavors were answered to her satisfaction.    Discussed COVID-19 with the patient, they are aware of our current policies and procedures, were given the option of delaying surgery, and they elect to proceed.    Patient was informed and understands the risks of surgery are greater for patients with a current condition or hx of heart disease, obesity, clotting disorders, recurrent infections, steroid use, current or past smoking, and factors such as sedentary lifestyle and noncompliance with medications, therapy or f/u. The degree of the increased risk is hard to estimate w/ any degree of precision.    Once no other questions were asked, a brief history and physical exam was then performed.    PAST MEDICAL HISTORY: History reviewed. No pertinent past medical history.  PAST SURGICAL HISTORY: History reviewed. No pertinent surgical history.  FAMILY HISTORY: History reviewed. No pertinent family history.  SOCIAL HISTORY:   Social History     Socioeconomic History    Marital status:    Tobacco Use    Smoking status: Former    Smokeless tobacco: Former       MEDICATIONS:   Current Outpatient Medications:     acetaminophen-codeine 300-30mg (TYLENOL #3) 300-30 mg Tab, Take 1 tablet by mouth 2 (two) times daily as needed. for pain, Disp: , Rfl: 0    amLODIPine (NORVASC) 10 MG tablet, Take 10 mg by mouth once daily., Disp: , Rfl: 1    azelastine (ASTELIN) 137 mcg (0.1 %) nasal spray, 1 spray (137 mcg " total) by Nasal route 2 (two) times daily., Disp: 30 mL, Rfl: 1    benzonatate (TESSALON PERLES) 100 MG capsule, Take 1 capsule (100 mg total) by mouth 3 (three) times daily as needed for Cough., Disp: 30 capsule, Rfl: 1    benzonatate (TESSALON) 100 MG capsule, TK ONE C PO Q 8 H PRF COUGH, Disp: , Rfl:     diclofenac sodium (VOLTAREN) 1 % Gel, APPLY 2 GRAMS EXTERNALLY TO THE AFFECTED AREA FOUR TIMES DAILY AS NEEDED, Disp: 100 g, Rfl: 4    dorzolamide-timolol 2-0.5% (COSOPT) 22.3-6.8 mg/mL ophthalmic solution, INSTILL 1 DROP INTO LEFT EYE TWICE DAILY, Disp: , Rfl:     esomeprazole (NEXIUM) 40 MG capsule, , Disp: , Rfl:     FLUAD QUAD 2020-21,65Y UP,,PF, 60 mcg (15 mcg x 4)/0.5 mL Syrg, ADM 0.5ML IM UTD, Disp: , Rfl:     fluticasone propionate (FLONASE) 50 mcg/actuation nasal spray, 1 spray (50 mcg total) by Each Nostril route once daily., Disp: 11.1 mL, Rfl: 0    gabapentin (NEURONTIN) 300 MG capsule, Take 300 mg by mouth 2 (two) times daily., Disp: , Rfl: 0    levocetirizine (XYZAL) 5 MG tablet, Take 1 tablet (5 mg total) by mouth every evening., Disp: 30 tablet, Rfl: 11    traMADoL (ULTRAM) 50 mg tablet, Take 1 tablet (50 mg total) by mouth every 12 (twelve) hours as needed for Pain., Disp: 60 tablet, Rfl: 5  ALLERGIES:   Review of patient's allergies indicates:   Allergen Reactions    Percodan [oxycodone-aspirin]     Tramadol Other (See Comments)     Insomnia       Review of Systems   Constitution: Negative. Negative for chills, fever and night sweats.   HENT: Negative for congestion and headaches.    Eyes: Negative for blurred vision, left vision loss and right vision loss.   Cardiovascular: Negative for chest pain and syncope.   Respiratory: Negative for cough and shortness of breath.    Endocrine: Negative for polydipsia, polyphagia and polyuria.   Hematologic/Lymphatic: Negative for bleeding problem. Does not bruise/bleed easily.   Skin: Negative for dry skin, itching and rash.   Musculoskeletal: Negative for  falls and muscle weakness.   Gastrointestinal: Negative for abdominal pain and bowel incontinence.   Genitourinary: Negative for bladder incontinence and nocturia.   Neurological: Negative for disturbances in coordination, loss of balance and seizures.   Psychiatric/Behavioral: Negative for depression. The patient does not have insomnia.    Allergic/Immunologic: Negative for hives and persistent infections.     PHYSICAL EXAM:  GEN: A&Ox3, WD WN NAD  HEENT: WNL  CHEST: CTAB, no W/R/R  HEART: RRR, no M/R/G   ABD: Soft, NT ND, BS x4 QUADS  MS: Refer to previous note for detailed MS exam  NEURO: CN II-XII intact       The surgical consent was then reviewed with the patient, who agreed with all the contents of the consent form and it was signed.     Due to the serious nature of total joint infection and the high prevalence of community acquired MRSA, vancomycin will be used perioperatively.     PHYSICAL THERAPY:  She was also instructed regarding physical therapy and will begin on POD#1-3. She is doing physical therapy at [facility].    POST OP CARE: Instructions were reviewed including care of the wound and dressing after surgery and when she can shower.     PAIN MANAGEMENT: Elena Willingham was instructed regarding the Polar ice unit that will be in place after surgery and her postoperative pain medications.     MEDICATION:  Norco  mg 1-2 q 4 hours PRN for pain  Zofran 4 mg q 8 hours PRN for nausea and vomiting.  Aspirin 81mg BID x 4 weeks for DVT prophylaxis starting on the evening after surgery.    Post op meds to be delivered bedside prior to discharge. Deliver to family if patient is in surgery at 5pm.     Patient was instructed to purchase and take Colace to counter possible GI side effects of taking opiates.     DVT prophylaxis was discussed with the patient today including risk factors for developing DVTs and history of DVTs. The patient was asked if any specific  recommendations were given from the doctor/s that did pre-operative surgical clearance.      If the patient was previously taking 81mg baby aspirin, they were told to not take additional baby aspirin, using the above stated aspirin and to restart the 81mg aspirin daily after completion of the aspirin dose.      Patient was also told to buy over the counter Prilosec medication and take it once daily for GI protection as long as they are taking NSAIDs or Aspirin.     The patient was told that narcotic pain medications may make them drowsy and instructions were given to not sign legal documents, drive or operate heavy machinery, cars, or equipment while under the influence of narcotic medications.     Dr. Woodson was present in clinic during this pre-op evaluation. The patient was offered the opportunity to ask Dr. Woodson any further questions regarding the procedure which may not have been addressed during their previous informed consent discussion. The patient has declined to see Dr. Woodson today.    As there were no other questions to be asked, she was given my business card along with Dr. Woodsno's business card if she has any questions or concerns prior to surgery or in the postop period.

## 2022-12-02 NOTE — H&P (VIEW-ONLY)
"Elena Willingham  is here for a completion of her perioperative paperwork. she  Is scheduled to undergo Right reverse shoulder arthroplasty on 12/7/2022.      She does need clearance for this procedure.     Per PCP, "cleared for surgery"  Per dental, cleared for surgery  Scanned under media tab    Risks, indications and benefits of the surgical procedure were discussed with the patient. All questions with regard to surgery, rehab, expected return to functional activities, activities of daily living and recreational endeavors were answered to her satisfaction.    Discussed COVID-19 with the patient, they are aware of our current policies and procedures, were given the option of delaying surgery, and they elect to proceed.    Patient was informed and understands the risks of surgery are greater for patients with a current condition or hx of heart disease, obesity, clotting disorders, recurrent infections, steroid use, current or past smoking, and factors such as sedentary lifestyle and noncompliance with medications, therapy or f/u. The degree of the increased risk is hard to estimate w/ any degree of precision.    Once no other questions were asked, a brief history and physical exam was then performed.    PAST MEDICAL HISTORY: History reviewed. No pertinent past medical history.  PAST SURGICAL HISTORY: History reviewed. No pertinent surgical history.  FAMILY HISTORY: History reviewed. No pertinent family history.  SOCIAL HISTORY:   Social History     Socioeconomic History    Marital status:    Tobacco Use    Smoking status: Former    Smokeless tobacco: Former       MEDICATIONS:   Current Outpatient Medications:     acetaminophen-codeine 300-30mg (TYLENOL #3) 300-30 mg Tab, Take 1 tablet by mouth 2 (two) times daily as needed. for pain, Disp: , Rfl: 0    amLODIPine (NORVASC) 10 MG tablet, Take 10 mg by mouth once daily., Disp: , Rfl: 1    azelastine (ASTELIN) 137 mcg (0.1 %) nasal spray, 1 spray (137 mcg " total) by Nasal route 2 (two) times daily., Disp: 30 mL, Rfl: 1    benzonatate (TESSALON PERLES) 100 MG capsule, Take 1 capsule (100 mg total) by mouth 3 (three) times daily as needed for Cough., Disp: 30 capsule, Rfl: 1    benzonatate (TESSALON) 100 MG capsule, TK ONE C PO Q 8 H PRF COUGH, Disp: , Rfl:     diclofenac sodium (VOLTAREN) 1 % Gel, APPLY 2 GRAMS EXTERNALLY TO THE AFFECTED AREA FOUR TIMES DAILY AS NEEDED, Disp: 100 g, Rfl: 4    dorzolamide-timolol 2-0.5% (COSOPT) 22.3-6.8 mg/mL ophthalmic solution, INSTILL 1 DROP INTO LEFT EYE TWICE DAILY, Disp: , Rfl:     esomeprazole (NEXIUM) 40 MG capsule, , Disp: , Rfl:     FLUAD QUAD 2020-21,65Y UP,,PF, 60 mcg (15 mcg x 4)/0.5 mL Syrg, ADM 0.5ML IM UTD, Disp: , Rfl:     fluticasone propionate (FLONASE) 50 mcg/actuation nasal spray, 1 spray (50 mcg total) by Each Nostril route once daily., Disp: 11.1 mL, Rfl: 0    gabapentin (NEURONTIN) 300 MG capsule, Take 300 mg by mouth 2 (two) times daily., Disp: , Rfl: 0    levocetirizine (XYZAL) 5 MG tablet, Take 1 tablet (5 mg total) by mouth every evening., Disp: 30 tablet, Rfl: 11    traMADoL (ULTRAM) 50 mg tablet, Take 1 tablet (50 mg total) by mouth every 12 (twelve) hours as needed for Pain., Disp: 60 tablet, Rfl: 5  ALLERGIES:   Review of patient's allergies indicates:   Allergen Reactions    Percodan [oxycodone-aspirin]     Tramadol Other (See Comments)     Insomnia       Review of Systems   Constitution: Negative. Negative for chills, fever and night sweats.   HENT: Negative for congestion and headaches.    Eyes: Negative for blurred vision, left vision loss and right vision loss.   Cardiovascular: Negative for chest pain and syncope.   Respiratory: Negative for cough and shortness of breath.    Endocrine: Negative for polydipsia, polyphagia and polyuria.   Hematologic/Lymphatic: Negative for bleeding problem. Does not bruise/bleed easily.   Skin: Negative for dry skin, itching and rash.   Musculoskeletal: Negative for  falls and muscle weakness.   Gastrointestinal: Negative for abdominal pain and bowel incontinence.   Genitourinary: Negative for bladder incontinence and nocturia.   Neurological: Negative for disturbances in coordination, loss of balance and seizures.   Psychiatric/Behavioral: Negative for depression. The patient does not have insomnia.    Allergic/Immunologic: Negative for hives and persistent infections.     PHYSICAL EXAM:  GEN: A&Ox3, WD WN NAD  HEENT: WNL  CHEST: CTAB, no W/R/R  HEART: RRR, no M/R/G   ABD: Soft, NT ND, BS x4 QUADS  MS: Refer to previous note for detailed MS exam  NEURO: CN II-XII intact       The surgical consent was then reviewed with the patient, who agreed with all the contents of the consent form and it was signed.     Due to the serious nature of total joint infection and the high prevalence of community acquired MRSA, vancomycin will be used perioperatively.     PHYSICAL THERAPY:  She was also instructed regarding physical therapy and will begin on POD#1-3. She is doing physical therapy at [facility].    POST OP CARE: Instructions were reviewed including care of the wound and dressing after surgery and when she can shower.     PAIN MANAGEMENT: Elena Willingham was instructed regarding the Polar ice unit that will be in place after surgery and her postoperative pain medications.     MEDICATION:  Norco  mg 1-2 q 4 hours PRN for pain  Zofran 4 mg q 8 hours PRN for nausea and vomiting.  Aspirin 81mg BID x 4 weeks for DVT prophylaxis starting on the evening after surgery.    Post op meds to be delivered bedside prior to discharge. Deliver to family if patient is in surgery at 5pm.     Patient was instructed to purchase and take Colace to counter possible GI side effects of taking opiates.     DVT prophylaxis was discussed with the patient today including risk factors for developing DVTs and history of DVTs. The patient was asked if any specific  recommendations were given from the doctor/s that did pre-operative surgical clearance.      If the patient was previously taking 81mg baby aspirin, they were told to not take additional baby aspirin, using the above stated aspirin and to restart the 81mg aspirin daily after completion of the aspirin dose.      Patient was also told to buy over the counter Prilosec medication and take it once daily for GI protection as long as they are taking NSAIDs or Aspirin.     The patient was told that narcotic pain medications may make them drowsy and instructions were given to not sign legal documents, drive or operate heavy machinery, cars, or equipment while under the influence of narcotic medications.     Dr. Woodson was present in clinic during this pre-op evaluation. The patient was offered the opportunity to ask Dr. Woodson any further questions regarding the procedure which may not have been addressed during their previous informed consent discussion. The patient has declined to see Dr. Woodson today.    As there were no other questions to be asked, she was given my business card along with Dr. Woodson's business card if she has any questions or concerns prior to surgery or in the postop period.

## 2022-12-03 DIAGNOSIS — M19.019 OSTEOARTHRITIS OF GLENOHUMERAL JOINT, UNSPECIFIED LATERALITY: ICD-10-CM

## 2022-12-03 DIAGNOSIS — I10 HYPERTENSION, UNSPECIFIED TYPE: Primary | ICD-10-CM

## 2022-12-03 DIAGNOSIS — M79.621 PAIN OF RIGHT UPPER ARM: ICD-10-CM

## 2022-12-03 NOTE — ANESTHESIA PAT ROS NOTE
12/03/2022  Elena Willingham is a 77 y.o., female.      Pre-op Assessment          Review of Systems  Anesthesia Hx:  No problems with previous Anesthesia  S/p retinal detachment repair  S/p mass removal from left surgery              Social:  Former Smoker, Social Alcohol Use Last smoked 30 plus years ago.  5-6 cig a day      Hematology/Oncology:    Oncology Normal    -- Denies Anemia:                                  EENT/Dental:   Allergies; currently post nasal drip          Cardiovascular:  Exercise tolerance: good   Hypertension, well controlled               Denies CP or SOB                         Pulmonary:     Denies Asthma.   Denies Shortness of breath.   Denies Sleep Apnea.                Renal/:  Renal/ Normal                 Hepatic/GI:     GERD, well controlled             Musculoskeletal:  Arthritis               Neurological:           Nerve problem in right legs-hx of steroids injections in the right lower back. Last 10 plus years ago                            Endocrine:  Endocrine Normal            Psych:  Psychiatric Normal                       Anesthesia Assessment: Preoperative EQUATION    Planned Procedure: Procedure(s) (LRB):  ARTHROPLASTY shoulder reverse (Right)  Requested Anesthesia Type:General  Surgeon: GAEL Woodson MD  Service: Orthopedics  Known or anticipated Date of Surgery:12/7/2022    Surgeon notes: reviewed-Osteoarthritis of right glenohumeral joint     Electronic QUestionnaire Assessment completed via nurse interview with patient.      Triage considerations:     The patient has no apparent active cardiac condition (No unstable coronary Syndrome such as severe unstable angina or recent [<1 month] myocardial infarction, decompensated CHF, severe valvular   disease or significant arrhythmia)    Previous anesthesia records:Not available    Last PCP note: outside  [facility]   Subspecialty notes: Vascular Surgery- 08/2020- Dr. Lowe-Asymptomatic PVD - rec medical mgmt with daily ASA, heart healthy lifestyle    Other important co-morbidities: HTN      Tests already available:  No recent tests.             Instructions given. (See in Nurse's note)    Optimization:  Anesthesia Preop Clinic Assessment  Indicated    Medical Opinion Indicated       Sub-specialist consult indicated:   TBD       Plan:    Testing:  BMP, EKG, and Hematology Profile Dr. Delvalle 12/7-preop evaluation.  Scanned into media.  Labs/test scanned into media, CBC, CMP,CXR, UA and EKG    Pre-anesthesia  visit       Visit focus: possible regional anesthesia and/or nerve block      Consultation:Patient's PCP for a statement of optimization Dr. Delvalle         Patient  has previously scheduled Medical Appointment:    Navigation: Tests Scheduled.              Consults scheduled.             Results will be tracked by Preop Clinic.

## 2022-12-05 RX ORDER — DEXTROSE MONOHYDRATE AND SODIUM CHLORIDE 5; .9 G/100ML; G/100ML
INJECTION, SOLUTION INTRAVENOUS CONTINUOUS
Status: CANCELLED | OUTPATIENT
Start: 2022-12-05

## 2022-12-05 RX ORDER — CARBOXYMETHYLCELLULOSE SODIUM 5 MG/ML
1 SOLUTION/ DROPS OPHTHALMIC 2 TIMES DAILY PRN
COMMUNITY

## 2022-12-05 RX ORDER — BISACODYL 10 MG
10 SUPPOSITORY, RECTAL RECTAL DAILY
Status: CANCELLED | OUTPATIENT
Start: 2022-12-05

## 2022-12-05 RX ORDER — FENTANYL CITRATE 50 UG/ML
100 INJECTION, SOLUTION INTRAMUSCULAR; INTRAVENOUS
Status: CANCELLED | OUTPATIENT
Start: 2022-12-05 | End: 2022-12-06

## 2022-12-05 RX ORDER — HYDROCODONE BITARTRATE AND ACETAMINOPHEN 10; 325 MG/1; MG/1
1 TABLET ORAL EVERY 4 HOURS PRN
Status: CANCELLED | OUTPATIENT
Start: 2022-12-05

## 2022-12-05 RX ORDER — PROCHLORPERAZINE EDISYLATE 5 MG/ML
5 INJECTION INTRAMUSCULAR; INTRAVENOUS EVERY 6 HOURS PRN
Status: CANCELLED | OUTPATIENT
Start: 2022-12-05

## 2022-12-05 RX ORDER — MUPIROCIN 20 MG/G
OINTMENT TOPICAL
Status: CANCELLED | OUTPATIENT
Start: 2022-12-05

## 2022-12-05 RX ORDER — MIDAZOLAM HYDROCHLORIDE 1 MG/ML
4 INJECTION INTRAMUSCULAR; INTRAVENOUS
Status: CANCELLED | OUTPATIENT
Start: 2022-12-05 | End: 2022-12-06

## 2022-12-05 RX ORDER — MONTELUKAST SODIUM 10 MG/1
10 TABLET ORAL DAILY PRN
COMMUNITY

## 2022-12-05 RX ORDER — HYDROCODONE BITARTRATE AND ACETAMINOPHEN 5; 325 MG/1; MG/1
1 TABLET ORAL EVERY 4 HOURS PRN
Status: CANCELLED | OUTPATIENT
Start: 2022-12-05

## 2022-12-05 RX ORDER — CELECOXIB 200 MG/1
400 CAPSULE ORAL
Status: CANCELLED | OUTPATIENT
Start: 2022-12-05

## 2022-12-05 RX ORDER — MUPIROCIN 20 MG/G
1 OINTMENT TOPICAL 2 TIMES DAILY
Status: CANCELLED | OUTPATIENT
Start: 2022-12-05 | End: 2022-12-10

## 2022-12-05 RX ORDER — ONDANSETRON 8 MG/1
8 TABLET, ORALLY DISINTEGRATING ORAL EVERY 8 HOURS PRN
Status: CANCELLED | OUTPATIENT
Start: 2022-12-05

## 2022-12-05 RX ORDER — POLYETHYLENE GLYCOL 3350 17 G/17G
17 POWDER, FOR SOLUTION ORAL DAILY
Status: CANCELLED | OUTPATIENT
Start: 2022-12-05

## 2022-12-05 RX ORDER — PREGABALIN 75 MG/1
75 CAPSULE ORAL NIGHTLY
Status: CANCELLED | OUTPATIENT
Start: 2022-12-05

## 2022-12-05 RX ORDER — ACETAMINOPHEN 500 MG
1000 TABLET ORAL
Status: CANCELLED | OUTPATIENT
Start: 2022-12-05

## 2022-12-05 RX ORDER — ASCORBIC ACID 500 MG
500 TABLET ORAL DAILY
COMMUNITY

## 2022-12-05 RX ORDER — AMOXICILLIN 250 MG
1 CAPSULE ORAL 2 TIMES DAILY
Status: CANCELLED | OUTPATIENT
Start: 2022-12-05

## 2022-12-05 RX ORDER — SODIUM CHLORIDE 0.9 % (FLUSH) 0.9 %
10 SYRINGE (ML) INJECTION
Status: CANCELLED | OUTPATIENT
Start: 2022-12-05

## 2022-12-05 RX ORDER — CELECOXIB 200 MG/1
200 CAPSULE ORAL DAILY
Status: CANCELLED | OUTPATIENT
Start: 2022-12-06

## 2022-12-05 RX ORDER — ROPIVACAINE HYDROCHLORIDE 2 MG/ML
6 INJECTION, SOLUTION EPIDURAL; INFILTRATION; PERINEURAL CONTINUOUS
Status: CANCELLED | OUTPATIENT
Start: 2022-12-05

## 2022-12-05 RX ORDER — ACETAMINOPHEN 500 MG
1000 TABLET ORAL EVERY 6 HOURS
Status: CANCELLED | OUTPATIENT
Start: 2022-12-05 | End: 2022-12-07

## 2022-12-05 RX ORDER — VITAMIN E 268 MG
400 CAPSULE ORAL DAILY
COMMUNITY

## 2022-12-05 RX ORDER — LANOLIN ALCOHOL/MO/W.PET/CERES
100 CREAM (GRAM) TOPICAL DAILY
COMMUNITY

## 2022-12-05 RX ORDER — FAMOTIDINE 20 MG/1
20 TABLET, FILM COATED ORAL 2 TIMES DAILY
Status: CANCELLED | OUTPATIENT
Start: 2022-12-05

## 2022-12-05 RX ORDER — FOLIC ACID 0.8 MG
800 TABLET ORAL DAILY
COMMUNITY

## 2022-12-05 RX ORDER — HYDROCODONE BITARTRATE AND ACETAMINOPHEN 10; 325 MG/1; MG/1
1 TABLET ORAL EVERY 6 HOURS PRN
Qty: 21 TABLET | Refills: 0 | Status: SHIPPED | OUTPATIENT
Start: 2022-12-05 | End: 2022-12-23 | Stop reason: SDUPTHER

## 2022-12-05 NOTE — PRE-PROCEDURE INSTRUCTIONS
Chart review; triage plan initiated.  Phone contact-medication reconciliation completed; instructed to hold vitamins, supplements and NSAIDs prior to surgery. Patient states she received preop instructions and have soap to shower with.

## 2022-12-06 ENCOUNTER — ANESTHESIA EVENT (OUTPATIENT)
Dept: SURGERY | Facility: HOSPITAL | Age: 77
End: 2022-12-06
Payer: MEDICARE

## 2022-12-06 ENCOUNTER — TELEPHONE (OUTPATIENT)
Dept: SPORTS MEDICINE | Facility: CLINIC | Age: 77
End: 2022-12-06
Payer: MEDICARE

## 2022-12-06 NOTE — TELEPHONE ENCOUNTER
----- Message from Candace Pitts sent at 12/6/2022  1:05 PM CST -----  Contact: pt  Pt calling for time of arrival       Confirmed patient's contact info below:  Contact Name: Elena Willingham  Phone Number: [phone]

## 2022-12-07 ENCOUNTER — ANESTHESIA (OUTPATIENT)
Dept: SURGERY | Facility: HOSPITAL | Age: 77
End: 2022-12-07
Payer: MEDICARE

## 2022-12-07 ENCOUNTER — HOSPITAL ENCOUNTER (OUTPATIENT)
Facility: HOSPITAL | Age: 77
Discharge: HOME | End: 2022-12-08
Attending: ORTHOPAEDIC SURGERY | Admitting: ORTHOPAEDIC SURGERY
Payer: MEDICARE

## 2022-12-07 DIAGNOSIS — M25.511 RIGHT SHOULDER PAIN, UNSPECIFIED CHRONICITY: ICD-10-CM

## 2022-12-07 DIAGNOSIS — M75.21 BICIPITAL TENDINITIS OF RIGHT SHOULDER: ICD-10-CM

## 2022-12-07 DIAGNOSIS — M19.011 PRIMARY OSTEOARTHRITIS OF RIGHT SHOULDER: ICD-10-CM

## 2022-12-07 PROCEDURE — 64415 PR NERVE BLOCK INJ, ANES/STEROID, BRACHIAL PLEXUS, INCL IMAG GUIDANCE: ICD-10-PCS | Mod: 59,RT,, | Performed by: ANESTHESIOLOGY

## 2022-12-07 PROCEDURE — C1769 GUIDE WIRE: HCPCS | Performed by: ORTHOPAEDIC SURGERY

## 2022-12-07 PROCEDURE — 76942 ECHO GUIDE FOR BIOPSY: CPT | Performed by: ANESTHESIOLOGY

## 2022-12-07 PROCEDURE — 23472 PR RECONSTR TOTAL SHOULDER IMPLANT: ICD-10-PCS | Mod: 82,RT,, | Performed by: ORTHOPAEDIC SURGERY

## 2022-12-07 PROCEDURE — 63600175 PHARM REV CODE 636 W HCPCS: Performed by: PHYSICIAN ASSISTANT

## 2022-12-07 PROCEDURE — 25000003 PHARM REV CODE 250: Performed by: PHYSICIAN ASSISTANT

## 2022-12-07 PROCEDURE — 23472 PR RECONSTR TOTAL SHOULDER IMPLANT: ICD-10-PCS | Mod: RT,,, | Performed by: ORTHOPAEDIC SURGERY

## 2022-12-07 PROCEDURE — D9220A PRA ANESTHESIA: ICD-10-PCS | Mod: ANES,,, | Performed by: ANESTHESIOLOGY

## 2022-12-07 PROCEDURE — C1713 ANCHOR/SCREW BN/BN,TIS/BN: HCPCS | Performed by: ORTHOPAEDIC SURGERY

## 2022-12-07 PROCEDURE — 23472 RECONSTRUCT SHOULDER JOINT: CPT | Mod: RT,,, | Performed by: ORTHOPAEDIC SURGERY

## 2022-12-07 PROCEDURE — C1776 JOINT DEVICE (IMPLANTABLE): HCPCS | Performed by: ORTHOPAEDIC SURGERY

## 2022-12-07 PROCEDURE — 37000008 HC ANESTHESIA 1ST 15 MINUTES: Performed by: ORTHOPAEDIC SURGERY

## 2022-12-07 PROCEDURE — 76942 PR U/S GUIDANCE FOR NEEDLE GUIDANCE: ICD-10-PCS | Mod: 26,,, | Performed by: ANESTHESIOLOGY

## 2022-12-07 PROCEDURE — 27100025 HC TUBING, SET FLUID WARMER: Performed by: ANESTHESIOLOGY

## 2022-12-07 PROCEDURE — 94761 N-INVAS EAR/PLS OXIMETRY MLT: CPT

## 2022-12-07 PROCEDURE — D9220A PRA ANESTHESIA: ICD-10-PCS | Mod: CRNA,,, | Performed by: NURSE ANESTHETIST, CERTIFIED REGISTERED

## 2022-12-07 PROCEDURE — 63600175 PHARM REV CODE 636 W HCPCS: Performed by: ANESTHESIOLOGY

## 2022-12-07 PROCEDURE — 63600175 PHARM REV CODE 636 W HCPCS: Performed by: STUDENT IN AN ORGANIZED HEALTH CARE EDUCATION/TRAINING PROGRAM

## 2022-12-07 PROCEDURE — 64415 NJX AA&/STRD BRCH PLXS IMG: CPT | Mod: 59,RT,, | Performed by: ANESTHESIOLOGY

## 2022-12-07 PROCEDURE — 25000003 PHARM REV CODE 250: Performed by: NURSE ANESTHETIST, CERTIFIED REGISTERED

## 2022-12-07 PROCEDURE — 36000705 HC OR TIME LEV I EA ADD 15 MIN: Performed by: ORTHOPAEDIC SURGERY

## 2022-12-07 PROCEDURE — 25000003 PHARM REV CODE 250: Performed by: ORTHOPAEDIC SURGERY

## 2022-12-07 PROCEDURE — 23430 PR REPAIR BICEPS LONG TENDON: ICD-10-PCS | Mod: 82,59,51,RT | Performed by: ORTHOPAEDIC SURGERY

## 2022-12-07 PROCEDURE — 27800903 OPTIME MED/SURG SUP & DEVICES OTHER IMPLANTS: Performed by: ORTHOPAEDIC SURGERY

## 2022-12-07 PROCEDURE — C1889 IMPLANT/INSERT DEVICE, NOC: HCPCS | Performed by: ORTHOPAEDIC SURGERY

## 2022-12-07 PROCEDURE — 71000033 HC RECOVERY, INTIAL HOUR: Performed by: ORTHOPAEDIC SURGERY

## 2022-12-07 PROCEDURE — 76942 ECHO GUIDE FOR BIOPSY: CPT | Mod: 26,,, | Performed by: ANESTHESIOLOGY

## 2022-12-07 PROCEDURE — 23430 REPAIR BICEPS TENDON: CPT | Mod: 59,51,RT, | Performed by: ORTHOPAEDIC SURGERY

## 2022-12-07 PROCEDURE — 23472 RECONSTRUCT SHOULDER JOINT: CPT | Mod: 82,RT,, | Performed by: ORTHOPAEDIC SURGERY

## 2022-12-07 PROCEDURE — 63600175 PHARM REV CODE 636 W HCPCS: Performed by: ORTHOPAEDIC SURGERY

## 2022-12-07 PROCEDURE — 27201423 OPTIME MED/SURG SUP & DEVICES STERILE SUPPLY: Performed by: ORTHOPAEDIC SURGERY

## 2022-12-07 PROCEDURE — 37000009 HC ANESTHESIA EA ADD 15 MINS: Performed by: ORTHOPAEDIC SURGERY

## 2022-12-07 PROCEDURE — 36000704 HC OR TIME LEV I 1ST 15 MIN: Performed by: ORTHOPAEDIC SURGERY

## 2022-12-07 PROCEDURE — 99900035 HC TECH TIME PER 15 MIN (STAT)

## 2022-12-07 PROCEDURE — 94799 UNLISTED PULMONARY SVC/PX: CPT

## 2022-12-07 PROCEDURE — D9220A PRA ANESTHESIA: Mod: CRNA,,, | Performed by: NURSE ANESTHETIST, CERTIFIED REGISTERED

## 2022-12-07 PROCEDURE — 23430 REPAIR BICEPS TENDON: CPT | Mod: 82,59,51,RT | Performed by: ORTHOPAEDIC SURGERY

## 2022-12-07 PROCEDURE — 71000039 HC RECOVERY, EACH ADD'L HOUR: Performed by: ORTHOPAEDIC SURGERY

## 2022-12-07 PROCEDURE — 23430 PR REPAIR BICEPS LONG TENDON: ICD-10-PCS | Mod: 59,51,RT, | Performed by: ORTHOPAEDIC SURGERY

## 2022-12-07 PROCEDURE — 63600175 PHARM REV CODE 636 W HCPCS: Performed by: NURSE ANESTHETIST, CERTIFIED REGISTERED

## 2022-12-07 PROCEDURE — D9220A PRA ANESTHESIA: Mod: ANES,,, | Performed by: ANESTHESIOLOGY

## 2022-12-07 PROCEDURE — 25000003 PHARM REV CODE 250: Performed by: STUDENT IN AN ORGANIZED HEALTH CARE EDUCATION/TRAINING PROGRAM

## 2022-12-07 DEVICE — IMPLANTABLE DEVICE: Type: IMPLANTABLE DEVICE | Site: SHOULDER | Status: FUNCTIONAL

## 2022-12-07 DEVICE — BASEPLATE GLENOID REV 18X5MM: Type: IMPLANTABLE DEVICE | Site: SHOULDER | Status: FUNCTIONAL

## 2022-12-07 DEVICE — SCREW AEQUALIS CNTRL 6.5X35MM: Type: IMPLANTABLE DEVICE | Site: SHOULDER | Status: FUNCTIONAL

## 2022-12-07 DEVICE — SPTERE GLENOID AEQUALIS 36MM: Type: IMPLANTABLE DEVICE | Site: SHOULDER | Status: FUNCTIONAL

## 2022-12-07 RX ORDER — AMOXICILLIN 250 MG
1 CAPSULE ORAL 2 TIMES DAILY
Status: DISCONTINUED | OUTPATIENT
Start: 2022-12-07 | End: 2022-12-08 | Stop reason: HOSPADM

## 2022-12-07 RX ORDER — CEFAZOLIN SODIUM 2 G/50ML
2 SOLUTION INTRAVENOUS
Status: COMPLETED | OUTPATIENT
Start: 2022-12-07 | End: 2022-12-07

## 2022-12-07 RX ORDER — CELECOXIB 200 MG/1
200 CAPSULE ORAL DAILY
Status: DISCONTINUED | OUTPATIENT
Start: 2022-12-08 | End: 2022-12-08 | Stop reason: HOSPADM

## 2022-12-07 RX ORDER — VANCOMYCIN HYDROCHLORIDE 1 G/20ML
INJECTION, POWDER, LYOPHILIZED, FOR SOLUTION INTRAVENOUS
Status: DISCONTINUED | OUTPATIENT
Start: 2022-12-07 | End: 2022-12-07 | Stop reason: HOSPADM

## 2022-12-07 RX ORDER — EPHEDRINE SULFATE 50 MG/ML
INJECTION, SOLUTION INTRAVENOUS
Status: DISCONTINUED | OUTPATIENT
Start: 2022-12-07 | End: 2022-12-07

## 2022-12-07 RX ORDER — NEOSTIGMINE METHYLSULFATE 1 MG/ML
INJECTION, SOLUTION INTRAVENOUS
Status: DISCONTINUED | OUTPATIENT
Start: 2022-12-07 | End: 2022-12-07

## 2022-12-07 RX ORDER — PREGABALIN 75 MG/1
75 CAPSULE ORAL NIGHTLY
Status: DISCONTINUED | OUTPATIENT
Start: 2022-12-07 | End: 2022-12-08 | Stop reason: HOSPADM

## 2022-12-07 RX ORDER — ACETAMINOPHEN 500 MG
1000 TABLET ORAL EVERY 6 HOURS
Status: DISCONTINUED | OUTPATIENT
Start: 2022-12-08 | End: 2022-12-08 | Stop reason: HOSPADM

## 2022-12-07 RX ORDER — PROPOFOL 10 MG/ML
VIAL (ML) INTRAVENOUS
Status: DISCONTINUED | OUTPATIENT
Start: 2022-12-07 | End: 2022-12-07

## 2022-12-07 RX ORDER — MIDAZOLAM HYDROCHLORIDE 1 MG/ML
4 INJECTION INTRAMUSCULAR; INTRAVENOUS
Status: DISCONTINUED | OUTPATIENT
Start: 2022-12-07 | End: 2022-12-07 | Stop reason: HOSPADM

## 2022-12-07 RX ORDER — FAMOTIDINE 20 MG/1
20 TABLET, FILM COATED ORAL 2 TIMES DAILY
Status: DISCONTINUED | OUTPATIENT
Start: 2022-12-07 | End: 2022-12-08 | Stop reason: HOSPADM

## 2022-12-07 RX ORDER — SODIUM CHLORIDE 0.9 % (FLUSH) 0.9 %
10 SYRINGE (ML) INJECTION
Status: DISCONTINUED | OUTPATIENT
Start: 2022-12-07 | End: 2022-12-07

## 2022-12-07 RX ORDER — OXYCODONE HYDROCHLORIDE 5 MG/1
5 TABLET ORAL EVERY 6 HOURS PRN
Status: DISCONTINUED | OUTPATIENT
Start: 2022-12-07 | End: 2022-12-07

## 2022-12-07 RX ORDER — MUPIROCIN 20 MG/G
1 OINTMENT TOPICAL 2 TIMES DAILY
Status: DISCONTINUED | OUTPATIENT
Start: 2022-12-07 | End: 2022-12-07

## 2022-12-07 RX ORDER — ACETAMINOPHEN 325 MG/1
650 TABLET ORAL EVERY 6 HOURS
Status: DISCONTINUED | OUTPATIENT
Start: 2022-12-08 | End: 2022-12-07

## 2022-12-07 RX ORDER — CARBOXYMETHYLCELLULOSE SODIUM 10 MG/ML
GEL OPHTHALMIC
Status: DISCONTINUED | OUTPATIENT
Start: 2022-12-07 | End: 2022-12-07

## 2022-12-07 RX ORDER — PROCHLORPERAZINE EDISYLATE 5 MG/ML
5 INJECTION INTRAMUSCULAR; INTRAVENOUS EVERY 6 HOURS PRN
Status: DISCONTINUED | OUTPATIENT
Start: 2022-12-07 | End: 2022-12-08 | Stop reason: HOSPADM

## 2022-12-07 RX ORDER — ROPIVACAINE HYDROCHLORIDE 5 MG/ML
INJECTION, SOLUTION EPIDURAL; INFILTRATION; PERINEURAL
Status: COMPLETED | OUTPATIENT
Start: 2022-12-07 | End: 2022-12-07

## 2022-12-07 RX ORDER — TRANEXAMIC ACID 100 MG/ML
1000 INJECTION, SOLUTION INTRAVENOUS
Status: DISCONTINUED | OUTPATIENT
Start: 2022-12-07 | End: 2022-12-07 | Stop reason: HOSPADM

## 2022-12-07 RX ORDER — KETAMINE HCL IN 0.9 % NACL 50 MG/5 ML
SYRINGE (ML) INTRAVENOUS
Status: DISCONTINUED | OUTPATIENT
Start: 2022-12-07 | End: 2022-12-07

## 2022-12-07 RX ORDER — FENTANYL CITRATE 50 UG/ML
INJECTION, SOLUTION INTRAMUSCULAR; INTRAVENOUS
Status: DISCONTINUED | OUTPATIENT
Start: 2022-12-07 | End: 2022-12-07

## 2022-12-07 RX ORDER — HALOPERIDOL 5 MG/ML
0.5 INJECTION INTRAMUSCULAR EVERY 10 MIN PRN
Status: DISCONTINUED | OUTPATIENT
Start: 2022-12-07 | End: 2022-12-07 | Stop reason: HOSPADM

## 2022-12-07 RX ORDER — DEXTROSE MONOHYDRATE AND SODIUM CHLORIDE 5; .9 G/100ML; G/100ML
INJECTION, SOLUTION INTRAVENOUS CONTINUOUS
Status: DISCONTINUED | OUTPATIENT
Start: 2022-12-07 | End: 2022-12-08 | Stop reason: HOSPADM

## 2022-12-07 RX ORDER — POLYETHYLENE GLYCOL 3350 17 G/17G
17 POWDER, FOR SOLUTION ORAL DAILY
Status: DISCONTINUED | OUTPATIENT
Start: 2022-12-07 | End: 2022-12-08 | Stop reason: HOSPADM

## 2022-12-07 RX ORDER — MORPHINE SULFATE 15 MG/1
30 TABLET ORAL EVERY 4 HOURS PRN
Status: DISCONTINUED | OUTPATIENT
Start: 2022-12-07 | End: 2022-12-08 | Stop reason: HOSPADM

## 2022-12-07 RX ORDER — ACETAMINOPHEN 500 MG
1000 TABLET ORAL
Status: COMPLETED | OUTPATIENT
Start: 2022-12-07 | End: 2022-12-07

## 2022-12-07 RX ORDER — MORPHINE SULFATE 15 MG/1
15 TABLET ORAL EVERY 4 HOURS PRN
Status: DISCONTINUED | OUTPATIENT
Start: 2022-12-07 | End: 2022-12-08 | Stop reason: HOSPADM

## 2022-12-07 RX ORDER — ACETAMINOPHEN 500 MG
1000 TABLET ORAL EVERY 6 HOURS
Status: DISCONTINUED | OUTPATIENT
Start: 2022-12-07 | End: 2022-12-07

## 2022-12-07 RX ORDER — HYDROCODONE BITARTRATE AND ACETAMINOPHEN 10; 325 MG/1; MG/1
1 TABLET ORAL EVERY 4 HOURS PRN
Status: DISCONTINUED | OUTPATIENT
Start: 2022-12-07 | End: 2022-12-07

## 2022-12-07 RX ORDER — ROPIVACAINE HYDROCHLORIDE 2 MG/ML
6 INJECTION, SOLUTION EPIDURAL; INFILTRATION; PERINEURAL CONTINUOUS
Status: DISCONTINUED | OUTPATIENT
Start: 2022-12-07 | End: 2022-12-07

## 2022-12-07 RX ORDER — LIDOCAINE HCL/PF 100 MG/5ML
SYRINGE (ML) INTRAVENOUS
Status: DISCONTINUED | OUTPATIENT
Start: 2022-12-07 | End: 2022-12-07

## 2022-12-07 RX ORDER — ONDANSETRON HYDROCHLORIDE 2 MG/ML
INJECTION, SOLUTION INTRAMUSCULAR; INTRAVENOUS
Status: DISCONTINUED | OUTPATIENT
Start: 2022-12-07 | End: 2022-12-07

## 2022-12-07 RX ORDER — TRANEXAMIC ACID 100 MG/ML
1000 INJECTION, SOLUTION INTRAVENOUS
Status: COMPLETED | OUTPATIENT
Start: 2022-12-07 | End: 2022-12-07

## 2022-12-07 RX ORDER — OXYCODONE HYDROCHLORIDE 5 MG/1
5 TABLET ORAL
Status: DISCONTINUED | OUTPATIENT
Start: 2022-12-07 | End: 2022-12-07 | Stop reason: HOSPADM

## 2022-12-07 RX ORDER — SODIUM CHLORIDE 9 MG/ML
INJECTION, SOLUTION INTRAVENOUS CONTINUOUS
Status: DISCONTINUED | OUTPATIENT
Start: 2022-12-07 | End: 2022-12-08 | Stop reason: HOSPADM

## 2022-12-07 RX ORDER — SODIUM CHLORIDE 0.9 % (FLUSH) 0.9 %
10 SYRINGE (ML) INJECTION
Status: DISCONTINUED | OUTPATIENT
Start: 2022-12-07 | End: 2022-12-07 | Stop reason: HOSPADM

## 2022-12-07 RX ORDER — DEXAMETHASONE SODIUM PHOSPHATE 4 MG/ML
INJECTION, SOLUTION INTRA-ARTICULAR; INTRALESIONAL; INTRAMUSCULAR; INTRAVENOUS; SOFT TISSUE
Status: DISCONTINUED | OUTPATIENT
Start: 2022-12-07 | End: 2022-12-07

## 2022-12-07 RX ORDER — ROCURONIUM BROMIDE 10 MG/ML
INJECTION, SOLUTION INTRAVENOUS
Status: DISCONTINUED | OUTPATIENT
Start: 2022-12-07 | End: 2022-12-07

## 2022-12-07 RX ORDER — ONDANSETRON 8 MG/1
8 TABLET, ORALLY DISINTEGRATING ORAL EVERY 8 HOURS PRN
Status: DISCONTINUED | OUTPATIENT
Start: 2022-12-07 | End: 2022-12-08 | Stop reason: HOSPADM

## 2022-12-07 RX ORDER — MUPIROCIN 20 MG/G
OINTMENT TOPICAL
Status: DISCONTINUED | OUTPATIENT
Start: 2022-12-07 | End: 2022-12-07 | Stop reason: HOSPADM

## 2022-12-07 RX ORDER — FENTANYL CITRATE 50 UG/ML
100 INJECTION, SOLUTION INTRAMUSCULAR; INTRAVENOUS
Status: DISCONTINUED | OUTPATIENT
Start: 2022-12-07 | End: 2022-12-07 | Stop reason: HOSPADM

## 2022-12-07 RX ORDER — FAMOTIDINE 10 MG/ML
INJECTION INTRAVENOUS
Status: DISCONTINUED | OUTPATIENT
Start: 2022-12-07 | End: 2022-12-07

## 2022-12-07 RX ORDER — VANCOMYCIN HCL IN 5 % DEXTROSE 1G/250ML
1000 PLASTIC BAG, INJECTION (ML) INTRAVENOUS
Status: COMPLETED | OUTPATIENT
Start: 2022-12-07 | End: 2022-12-07

## 2022-12-07 RX ORDER — BISACODYL 10 MG
10 SUPPOSITORY, RECTAL RECTAL DAILY
Status: DISCONTINUED | OUTPATIENT
Start: 2022-12-07 | End: 2022-12-08 | Stop reason: HOSPADM

## 2022-12-07 RX ORDER — HYDROCODONE BITARTRATE AND ACETAMINOPHEN 5; 325 MG/1; MG/1
1 TABLET ORAL EVERY 4 HOURS PRN
Status: DISCONTINUED | OUTPATIENT
Start: 2022-12-07 | End: 2022-12-07

## 2022-12-07 RX ORDER — CELECOXIB 200 MG/1
400 CAPSULE ORAL
Status: COMPLETED | OUTPATIENT
Start: 2022-12-07 | End: 2022-12-07

## 2022-12-07 RX ORDER — FENTANYL CITRATE 50 UG/ML
25 INJECTION, SOLUTION INTRAMUSCULAR; INTRAVENOUS EVERY 5 MIN PRN
Status: DISCONTINUED | OUTPATIENT
Start: 2022-12-07 | End: 2022-12-07 | Stop reason: HOSPADM

## 2022-12-07 RX ORDER — ONDANSETRON 2 MG/ML
4 INJECTION INTRAMUSCULAR; INTRAVENOUS DAILY PRN
Status: DISCONTINUED | OUTPATIENT
Start: 2022-12-07 | End: 2022-12-07 | Stop reason: HOSPADM

## 2022-12-07 RX ORDER — ROPIVACAINE HYDROCHLORIDE 2 MG/ML
INJECTION, SOLUTION EPIDURAL; INFILTRATION; PERINEURAL
Status: COMPLETED | OUTPATIENT
Start: 2022-12-07 | End: 2022-12-07

## 2022-12-07 RX ORDER — OXYCODONE HYDROCHLORIDE 10 MG/1
10 TABLET ORAL EVERY 6 HOURS PRN
Status: DISCONTINUED | OUTPATIENT
Start: 2022-12-07 | End: 2022-12-07

## 2022-12-07 RX ADMIN — SODIUM CHLORIDE, SODIUM GLUCONATE, SODIUM ACETATE, POTASSIUM CHLORIDE, MAGNESIUM CHLORIDE, SODIUM PHOSPHATE, DIBASIC, AND POTASSIUM PHOSPHATE: .53; .5; .37; .037; .03; .012; .00082 INJECTION, SOLUTION INTRAVENOUS at 09:12

## 2022-12-07 RX ADMIN — TRANEXAMIC ACID 1000 MG: 100 INJECTION, SOLUTION INTRAVENOUS at 11:12

## 2022-12-07 RX ADMIN — ONDANSETRON 4 MG: 2 INJECTION INTRAMUSCULAR; INTRAVENOUS at 11:12

## 2022-12-07 RX ADMIN — EPHEDRINE SULFATE 15 MG: 50 INJECTION INTRAVENOUS at 10:12

## 2022-12-07 RX ADMIN — ROCURONIUM BROMIDE 50 MG: 10 INJECTION INTRAVENOUS at 09:12

## 2022-12-07 RX ADMIN — DEXAMETHASONE SODIUM PHOSPHATE 8 MG: 4 INJECTION, SOLUTION INTRAMUSCULAR; INTRAVENOUS at 09:12

## 2022-12-07 RX ADMIN — MIDAZOLAM HYDROCHLORIDE 1 MG: 1 INJECTION, SOLUTION INTRAMUSCULAR; INTRAVENOUS at 08:12

## 2022-12-07 RX ADMIN — PREGABALIN 75 MG: 75 CAPSULE ORAL at 08:12

## 2022-12-07 RX ADMIN — ACETAMINOPHEN 1000 MG: 500 TABLET ORAL at 11:12

## 2022-12-07 RX ADMIN — SODIUM CHLORIDE: 0.9 INJECTION, SOLUTION INTRAVENOUS at 07:12

## 2022-12-07 RX ADMIN — VANCOMYCIN HYDROCHLORIDE 1000 MG: 1 INJECTION, POWDER, LYOPHILIZED, FOR SOLUTION INTRAVENOUS at 07:12

## 2022-12-07 RX ADMIN — TRANEXAMIC ACID 1000 MG: 100 INJECTION, SOLUTION INTRAVENOUS at 09:12

## 2022-12-07 RX ADMIN — DEXTROSE AND SODIUM CHLORIDE: 5; 900 INJECTION, SOLUTION INTRAVENOUS at 12:12

## 2022-12-07 RX ADMIN — EPHEDRINE SULFATE 10 MG: 50 INJECTION INTRAVENOUS at 09:12

## 2022-12-07 RX ADMIN — CEFAZOLIN SODIUM 2 G: 2 SOLUTION INTRAVENOUS at 09:12

## 2022-12-07 RX ADMIN — LIDOCAINE HYDROCHLORIDE 100 MG: 20 INJECTION, SOLUTION INTRAVENOUS at 09:12

## 2022-12-07 RX ADMIN — PROPOFOL 150 MG: 10 INJECTION, EMULSION INTRAVENOUS at 09:12

## 2022-12-07 RX ADMIN — CARBOXYMETHYLCELLULOSE SODIUM 4 DROP: 10 GEL OPHTHALMIC at 09:12

## 2022-12-07 RX ADMIN — Medication 20 MG: at 09:12

## 2022-12-07 RX ADMIN — FENTANYL CITRATE 100 MCG: 0.05 INJECTION, SOLUTION INTRAMUSCULAR; INTRAVENOUS at 09:12

## 2022-12-07 RX ADMIN — ACETAMINOPHEN 1000 MG: 500 TABLET ORAL at 07:12

## 2022-12-07 RX ADMIN — NEOSTIGMINE METHYLSULFATE 4 MG: 1 INJECTION INTRAVENOUS at 11:12

## 2022-12-07 RX ADMIN — ROPIVACAINE HYDROCHLORIDE 10 ML: 5 INJECTION EPIDURAL; INFILTRATION; PERINEURAL at 08:12

## 2022-12-07 RX ADMIN — DEXTROSE AND SODIUM CHLORIDE: 5; 900 INJECTION, SOLUTION INTRAVENOUS at 08:12

## 2022-12-07 RX ADMIN — CELECOXIB 400 MG: 200 CAPSULE ORAL at 07:12

## 2022-12-07 RX ADMIN — FENTANYL CITRATE 50 MCG: 50 INJECTION INTRAMUSCULAR; INTRAVENOUS at 08:12

## 2022-12-07 RX ADMIN — GLYCOPYRROLATE 0.4 MG: 0.2 INJECTION, SOLUTION INTRAMUSCULAR; INTRAVENOUS at 11:12

## 2022-12-07 RX ADMIN — FAMOTIDINE 20 MG: 20 TABLET ORAL at 08:12

## 2022-12-07 RX ADMIN — FAMOTIDINE 20 MG: 10 INJECTION, SOLUTION INTRAVENOUS at 09:12

## 2022-12-07 RX ADMIN — SENNOSIDES AND DOCUSATE SODIUM 1 TABLET: 50; 8.6 TABLET ORAL at 08:12

## 2022-12-07 RX ADMIN — SODIUM CHLORIDE, SODIUM GLUCONATE, SODIUM ACETATE, POTASSIUM CHLORIDE, MAGNESIUM CHLORIDE, SODIUM PHOSPHATE, DIBASIC, AND POTASSIUM PHOSPHATE: .53; .5; .37; .037; .03; .012; .00082 INJECTION, SOLUTION INTRAVENOUS at 10:12

## 2022-12-07 RX ADMIN — CEFAZOLIN 2 G: 2 INJECTION, POWDER, FOR SOLUTION INTRAMUSCULAR; INTRAVENOUS at 05:12

## 2022-12-07 NOTE — OPERATIVE NOTE ADDENDUM
Certification of Assistant at Surgery       Surgery Date: 12/7/2022     Participating Surgeons:  Surgeon(s) and Role:     * W Luca Woodson MD - Primary    Procedures:  Procedure(s) (LRB):  ARTHROPLASTY shoulder reverse (Right)    Assistant Surgeon's Certification of Necessity:  I understand that section 1842 (b) (6) (d) of the Social Security Act generally prohibits Medicare Part B reasonable charge payment for the services of assistants at surgery in teaching hospitals when qualified residents are available to furnish such services. I certify that the services for which payment is claimed were medically necessary, and that no qualified resident was available to perform the services. I further understand that these services are subject to post-payment review by the Medicare carrier.      SCOTT TABOR MD    12/07/2022  11:51 AM

## 2022-12-07 NOTE — ANESTHESIA PROCEDURE NOTES
Intubation    Date/Time: 12/7/2022 9:14 AM  Performed by: Jamila Ramirez CRNA  Authorized by: Kiesha Nunez MD     Intubation:     Induction:  Intravenous    Intubated:  Postinduction    Mask Ventilation:  Easy with oral airway    Attempts:  1    Attempted By:  CRNA    Method of Intubation:  Direct    Blade:  [place] 2    Laryngeal View Grade: Grade I - full view of cords      Difficult Airway Encountered?: No      Complications:  None    Airway Device:  Oral endotracheal tube    Airway Device Size:  7.5    Style/Cuff Inflation:  Cuffed    Inflation Amount (mL):  3    Tube secured:  22    Secured at:  The lips    Placement Verified By:  Capnometry    Complicating Factors:  None    Findings Post-Intubation:  BS equal bilateral and atraumatic/condition of teeth unchanged

## 2022-12-07 NOTE — PLAN OF CARE
Preop complete. Pt has questions about her surgery. Will have MD speak with pt. Pt resting comfortably. Daughter at BS. Call light in reach

## 2022-12-07 NOTE — NURSING TRANSFER
Nursing Transfer Note      12/7/2022     Reason patient is being transferred: extended recovery.    Transfer To: recovery suites from pacu.    Transfer via bed    Transfer with ivf.    Transported by KARIS Jensen and ALFRED Diego.    Medicines sent: mupirocin.    Any special needs or follow-up needed: none.    Chart send with patient: Yes    Notified:

## 2022-12-07 NOTE — BRIEF OP NOTE
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    DISPOSITION: Admitted as an Inpatient    FINAL DIAGNOSIS: Right shoulder osteoarthritis    FOLLOWUP: In clinic    DISCHARGE INSTRUCTIONS:  No discharge procedures on file.

## 2022-12-07 NOTE — TRANSFER OF CARE
"Anesthesia Transfer of Care Note    Patient: Elena Willingham    Procedure(s) Performed: Procedure(s) (LRB):  ARTHROPLASTY shoulder reverse (Right)    Patient location: PACU    Anesthesia Type: general    Transport from OR: Transported from OR on 6-10 L/min O2 by face mask with adequate spontaneous ventilation    Post pain: adequate analgesia    Post assessment: no apparent anesthetic complications    Post vital signs: stable    Level of consciousness: awake and alert    Nausea/Vomiting: no nausea/vomiting    Complications: none    Transfer of care protocol was followed      Last vitals:   Visit Vitals  /66 (BP Location: Left arm, Patient Position: Lying)   Pulse 74   Temp 36.3 °C (97.4 °F) (Oral)   Resp 19   Ht 5' 6" (1.676 m)   Wt 69.4 kg (153 lb)   SpO2 97%   Breastfeeding No   BMI 24.69 kg/m²     "

## 2022-12-07 NOTE — ANESTHESIA PREPROCEDURE EVALUATION
12/07/2022  Elena Willingham is a 77 y.o., female.    Procedure Summary    Case: [id number] Date/Time: 12/07/22 0900   Procedure: ARTHROPLASTY shoulder reverse (Right) - interscalene   regional w/o catheter   Anesthesia type: General   Diagnosis:        Senile arthritis [M19.90]       Primary osteoarthritis of right shoulder [M19.011]     There is no problem list on file for this patient.    History reviewed. No pertinent surgical history.    Current Discharge Medication List      CONTINUE these medications which have NOT CHANGED    Details   amLODIPine (NORVASC) 10 MG tablet Take 10 mg by mouth nightly.  Refills: 1      ascorbic acid, vitamin C, (VITAMIN C) 500 MG tablet Take 500 mg by mouth once daily.      cholecalciferol, vitamin D3, (VITAMIN D3 ORAL) Take 5,000 Units by mouth once daily.      cranberry conc/ascorbic acid (CRANBERRY PLUS VITAMIN C ORAL) Take 500 mg by mouth once daily.      cyanocobalamin (VITAMIN B-12) 1000 MCG tablet Take 100 mcg by mouth once daily.      esomeprazole (NEXIUM) 40 MG capsule       folic acid (FOLVITE) 800 MCG Tab Take 800 mcg by mouth once daily.      gabapentin (NEURONTIN) 300 MG capsule Take 300 mg by mouth nightly as needed.  Refills: 0      montelukast (SINGULAIR) 10 mg tablet Take 10 mg by mouth daily as needed.      vitamin E 400 UNIT capsule Take 400 Units by mouth once daily.      aspirin (ECOTRIN) 81 MG EC tablet Take 1 tablet twice a day with food starting after surgery (breakfast and dinner).  Qty: 28 tablet, Refills: 0    Associated Diagnoses: Primary osteoarthritis of right shoulder; Right shoulder pain, unspecified chronicity; Bicipital tendinitis of right shoulder      benzonatate (TESSALON PERLES) 100 MG capsule Take 1 capsule (100 mg total) by mouth 3 (three) times daily as needed for Cough.  Qty: 30 capsule, Refills: 1    Associated Diagnoses: Cough       carboxymethylcellulose (REFRESH PLUS) 0.5 % Dpet Place 1 drop into both eyes 2 (two) times daily as needed.      diclofenac sodium (VOLTAREN) 1 % Gel APPLY 2 GRAMS EXTERNALLY TO THE AFFECTED AREA FOUR TIMES DAILY AS NEEDED  Qty: 100 g, Refills: 4    Associated Diagnoses: Polyarthralgia      dorzolamide-timolol 2-0.5% (COSOPT) 22.3-6.8 mg/mL ophthalmic solution INSTILL 1 DROP INTO LEFT EYE TWICE DAILY      fluticasone propionate (FLONASE) 50 mcg/actuation nasal spray 1 spray (50 mcg total) by Each Nostril route once daily.  Qty: 11.1 mL, Refills: 0    Associated Diagnoses: Non-recurrent acute serous otitis media of right ear      HYDROcodone-acetaminophen (NORCO)  mg per tablet Take 1 tablet by mouth every 6 (six) hours as needed for Pain.  Qty: 21 tablet, Refills: 0    Comments: Quantity prescribed more than 7 day supply? No  Associated Diagnoses: Primary osteoarthritis of right shoulder; Right shoulder pain, unspecified chronicity; Bicipital tendinitis of right shoulder      ondansetron (ZOFRAN) 4 MG tablet Take 1 tablet (4 mg total) by mouth every 8 (eight) hours as needed for Nausea.  Qty: 30 tablet, Refills: 0    Associated Diagnoses: Primary osteoarthritis of right shoulder; Right shoulder pain, unspecified chronicity; Bicipital tendinitis of right shoulder                 Pre-op Assessment    I have reviewed the Patient Summary Reports.     I have reviewed the Nursing Notes. I have reviewed the NPO Status.   I have reviewed the Medications.     Review of Systems  Anesthesia Hx:  No problems with previous Anesthesia S/p retinal detachment repair  S/p mass removal from left surgery Denies Family Hx of Anesthesia complications.   Denies Personal Hx of Anesthesia complications.   Social:  Former Smoker, Social Alcohol Use Last smoked 30 plus years ago.  5-6 cig a day   Hematology/Oncology:     Oncology Normal    -- Denies Anemia:   EENT/Dental:   Allergies; currently post nasal drip   Cardiovascular:    Exercise tolerance: good Hypertension, well controlled Denies CP or SOB   Pulmonary:   Denies Asthma.  Denies Shortness of breath.  Denies Sleep Apnea.    Renal/:  Renal/ Normal     Hepatic/GI:   GERD, well controlled    Musculoskeletal:   Arthritis     Neurological:   Nerve problem in right legs-hx of steroids injections in the right lower back. Last 10 plus years ago   Endocrine:  Endocrine Normal    Psych:  Psychiatric Normal           Physical Exam    Airway:  Mallampati: II / II  Mouth Opening: Normal  TM Distance: Normal  Tongue: Normal  Neck ROM: Normal ROM          Anesthesia Assessment: Preoperative EQUATION    Planned Procedure: Procedure(s) (LRB):  ARTHROPLASTY shoulder reverse (Right)  Requested Anesthesia Type:General  Surgeon: GAEL Woodson MD  Service: Orthopedics  Known or anticipated Date of Surgery:12/7/2022    Surgeon notes: reviewed-Osteoarthritis of right glenohumeral joint     Electronic QUestionnaire Assessment completed via nurse interview with patient.      Triage considerations:     The patient has no apparent active cardiac condition (No unstable coronary Syndrome such as severe unstable angina or recent [<1 month] myocardial infarction, decompensated CHF, severe valvular   disease or significant arrhythmia)    Previous anesthesia records:Not available    Last PCP note: outside [facility]   Subspecialty notes: Vascular Surgery- 08/2020- Dr. Lowe-Asymptomatic PVD - rec medical mgmt with daily ASA, heart healthy lifestyle    Other important co-morbidities: HTN      Tests already available:  No recent tests.             Instructions given. (See in Nurse's note)    Optimization:  Anesthesia Preop Clinic Assessment  Indicated    Medical Opinion Indicated       Sub-specialist consult indicated:   TBD       Plan:    Testing:  BMP, EKG, and Hematology Profile Dr. Delvalle 12/7-preop evaluation.  Scanned into media.  Labs/test scanned into media, CBC, CMP,CXR, UA and EKG    Pre-anesthesia   visit       Visit focus: possible regional anesthesia and/or nerve block      Consultation:Patient's PCP for a statement of optimization Dr. Delvalle         Patient  has previously scheduled Medical Appointment:    Navigation: Tests Scheduled.              Consults scheduled.             Results will be tracked by Preop Clinic.         Anesthesia Plan  Type of Anesthesia, risks & benefits discussed:    Anesthesia Type: Gen ETT, Regional  Intra-op Monitoring Plan: Standard ASA Monitors  Post Op Pain Control Plan: multimodal analgesia, peripheral nerve block and IV/PO Opioids PRN  Induction:  IV  Airway Plan: Direct  Informed Consent: Informed consent signed with the Patient and all parties understand the risks and agree with anesthesia plan.  All questions answered.   ASA Score: 2    Ready For Surgery From Anesthesia Perspective.       .

## 2022-12-07 NOTE — OP NOTE
[facility]   OPERATIVE REPORT   ORTHOPAEDIC SURGERY   PROVIDER: DR. RIKI KNOTT    PATIENT INFORMATION   Elena Willingham 77 y.o. female 1945   MRN: [id number]   LOCATION: [facility]     DATE OF PROCEDURE: 12/7/2022     PREOPERATIVE DIAGNOSES:   Right shoulder osteoarthritis     POSTOPERATIVE DIAGNOSES:   Right shoulder osteoarthritis   Right shoulder biceps tendinopathy     PROCEDURES PERFORMED:   Right reverse shoulder arthroplasty(CPT 74449)  Right shoulder open biceps tenodesis (CPT 87453)    SURGEON:  RIKI Knott MD     ASSISTANTS:  Rui Dallas MD - Fellow - First Assist  TONJA Case     First Assistant Duties: Due to the complexity of the case and the need for significant intra-operative decision making, first assistant duties were medically necessary. The chronicity of the disease process and the level of deformity dictated that first assistant duties be undertaken. Assistance was provided for joint exposure, manipulation, and retractor placement. There was no qualified resident available for the procedure.      ANESTHESIA: General with interscalene block     ESTIMATED BLOOD LOSS: 200 mL    IMPLANTS:   Implant Name Type Inv. Item Serial No.  Lot No. LRB No. Used Action   PIN GUIDE SIMPLICITI 3X75 - [id number]  PIN GUIDE SIMPLICITI 3X75  [employer] [id number] Right 1 Implanted and Explanted   PIN GUIDE SIMPLICITI 3X75 - [id number]  PIN GUIDE SIMPLICITI 3X75  [employer] [id number] Right 1 Implanted and Explanted   PIN GUIDE AEQUALIS 2.5C591PN - [id number]  PIN GUIDE AEQUALIS 2.9S211WX  [employer] [id number] Right 1 Implanted and Explanted   BASEPLATE LATERALIZED 25MM +3 - [id number]  BASEPLATE LATERALIZED 25MM +3  [employer] [id number] Right 1 Implanted   SPTERE GLENOID AEQUALIS 36MM - [id number]  SPTERE GLENOID AEQUALIS 36MM  [employer] [id number] Right 1 Implanted   reversed tray    [employer] [id number] Right 1 Implanted   reversed insert     [employer] [id number] Right 1 Implanted   humeral stem    [employer] [id number] Right 1 Implanted   SCREW AEQUALIS CNTRL 6.5X35MM - [id number]  SCREW AEQUALIS CNTRL 6.5X35MM  [employer] [id number] Right 1 Implanted   screw    [employer] [id number] Right 2 Implanted   BASEPLATE GLENOID REV 18X5MM - [id number]  BASEPLATE GLENOID REV 18X5MM  [employer] [id number] Right 2 Implanted      FINDINGS: Advanced glenohumeral DJD with medial glenoid erosion. Intact rotator cuff. Thin subscapularis tendon.      SPECIMENS: None.    COMPLICATIONS: None.     INTRAOPERATIVE COUNTS: Correct.     PROPHYLACTIC IV ANTIBIOTICS: Given per [facility] Protocol.    INDICATIONS FOR OPERATION: Elena is a 77 year old female who has been seen and evaluated in the office and found to have lifestyle-limiting pain and dysfunction secondary to advanced right shoulder arthropathy with medial glenoid erosion/dysplasia. The patient has been indicated for reverse shoulder arthroplasty.  This required preoperative planning to include use of a 3D CT software system for implant planning. After a lengthy discussion with the patient, she elected to proceed with surgical intervention. The patient was extensively counseled and fully informed of risks and benefits.     DESCRIPTION OF PROCEDURE: After informed consent was obtained, the patient was taken to the operating room and placed in the supine position.  General anesthetic was administered.  The right shoulder was then prepped and draped in the usual sterile fashion.  The time-out was performed and it was confirmed that antibiotics had been delivered.  A block was performed preoperatively per protocol.     The deltopectoral interval was exposed and the underlying thinned subscapularis was released. The biceps long head was identified and found to be thickened and inflamed. There was significant tendinopathy. It was sutured to the pectoralis major insertion with #2 Fiberwire to complete a soft tissue  biceps tenodesis. The proximal portion of the tendon was then cut and resected. Circumferential releases were performed off the humeral neck and the humeral head was delivered into the incision. There was a large goat's beard osteophyte which was removed. The superior cuff was mostly intact. The supraspinatus was release and resected. The infraspinatus and teres minor tendons were largely preserved for rotational motion.       The degenerative humeral head was then osteotomized and the canal was prepped. Appropriate reaming and then broaching was performed. Bone quality was good.      Attention was then directed towards glenoid exposure. Circumferential releases were performed and the glenoid was exposed. The center pin starting point for reaming was carefully selected based upon preoperative planning from the 3D CT scan and templating software.  The anterior osteophyte was partially removed. Reaming was carried forth to accept the existing version which was within normal limits and appropriate for the desired center screw and post position. The center screw and post holes were drilled and reamed carefully. The center post hole was completely contained without vault perforation. Limited reaming was needed in this case. The 3 mm lateral offset 25 mm glenoid baseplate was then placed and secured with the 35 mm center screw and 4 peripheral screws - 2 non-locking and 2 locking. The 3 o'clock non-locking screw had a very good bite. The baseplate was fully seated against bone. The size 36 centered glenosphere was then impacted onto the baseplate.       Trialing was performed off the humeral side and a 4A Ascend Flex final stem was placed to create as much of a lateralized construct as possible. The final size +6 low offset tray with +6C poly humeral socket was then impacted onto the stem for a total humeral offset of +12 mm - including baseplate and humeral offsets. The joint was reduced and showed excellent stability  and motion. A portion of the osteotomized head was used to create a bone shin which was placed over the medial calcar region in this case to maximize axial and rotation stability with stem press fit.     Dilute betadine wash followed by normal saline was used to irrigate the wound. Vancomycin powder was also placed prior to wound closure. No subscapularis repair was performed. At the conclusion of the case, some of the superior cuff was resected to debulk the subacromial space.  The infraspinatus was left intact for external rotation function. Tranexamic acid was given intravenously before incision to limit blood loss.                 The wounds were copiously irrigated and closed in the standard fashion. Sterile dressing was applied with a Polar Care and sling.  Patient was taken to recovery room.     POSTOPERATIVE PLAN: The patient will be admitted for postoperative care per protocol.  DVT prophylaxis given. Follow a RTSA rehab protocol.

## 2022-12-07 NOTE — ANESTHESIA PROCEDURE NOTES
Right interscalene single shot    Patient location during procedure: pre-op   Block not for primary anesthetic.  Reason for block: at surgeon's request and post-op pain management   Post-op Pain Location: Right shoulder pain   Start time: 12/7/2022 8:16 AM  Timeout: 12/7/2022 8:15 AM   End time: 12/7/2022 8:19 AM    Staffing  Authorizing Provider: Kiesha Nunez MD  Performing Provider: Kiesha Nunez MD    Preanesthetic Checklist  Completed: patient identified, IV checked, site marked, risks and benefits discussed, surgical consent, monitors and equipment checked, pre-op evaluation and timeout performed  Peripheral Block  Patient position: sitting  Prep: ChloraPrep  Patient monitoring: heart rate, cardiac monitor, continuous pulse ox, continuous capnometry and frequent blood pressure checks  Block type: interscalene  Laterality: right  Injection technique: single shot  Needle  Needle type: Stimuplex   Needle gauge: 22 G  Needle length: 2 in  Needle localization: anatomical landmarks and ultrasound guidance   -ultrasound image captured on disc.  Assessment  Injection assessment: negative aspiration, negative parasthesia and local visualized surrounding nerve  Paresthesia pain: none  Heart rate change: no  Slow fractionated injection: yes    Medications:    Medications: ropivacaine (NAROPIN) injection 0.5% - Perineural   10 mL - 12/7/2022 8:18:00 AM    Additional Notes  VSS.  [facility] RN monitoring vitals throughout procedure.  Patient tolerated procedure well.     With 10mL normal saline, 1:200,000 epi, 1mg PF decadron, 50mcg clonidine

## 2022-12-08 VITALS
HEIGHT: 66 IN | DIASTOLIC BLOOD PRESSURE: 61 MMHG | OXYGEN SATURATION: 97 % | HEART RATE: 65 BPM | SYSTOLIC BLOOD PRESSURE: 128 MMHG | RESPIRATION RATE: 16 BRPM | WEIGHT: 153 LBS | TEMPERATURE: 97 F | BODY MASS INDEX: 24.59 KG/M2

## 2022-12-08 PROCEDURE — 25000003 PHARM REV CODE 250: Performed by: STUDENT IN AN ORGANIZED HEALTH CARE EDUCATION/TRAINING PROGRAM

## 2022-12-08 PROCEDURE — 99900035 HC TECH TIME PER 15 MIN (STAT)

## 2022-12-08 PROCEDURE — 97161 PT EVAL LOW COMPLEX 20 MIN: CPT

## 2022-12-08 PROCEDURE — 97110 THERAPEUTIC EXERCISES: CPT

## 2022-12-08 PROCEDURE — 63600175 PHARM REV CODE 636 W HCPCS: Performed by: STUDENT IN AN ORGANIZED HEALTH CARE EDUCATION/TRAINING PROGRAM

## 2022-12-08 PROCEDURE — 25000003 PHARM REV CODE 250: Performed by: PHYSICIAN ASSISTANT

## 2022-12-08 PROCEDURE — 97165 OT EVAL LOW COMPLEX 30 MIN: CPT

## 2022-12-08 PROCEDURE — 97535 SELF CARE MNGMENT TRAINING: CPT

## 2022-12-08 RX ORDER — NAPROXEN SODIUM 220 MG/1
81 TABLET, FILM COATED ORAL DAILY
Status: DISCONTINUED | OUTPATIENT
Start: 2022-12-08 | End: 2022-12-08 | Stop reason: HOSPADM

## 2022-12-08 RX ADMIN — ASPIRIN 81 MG: 81 TABLET, CHEWABLE ORAL at 10:12

## 2022-12-08 RX ADMIN — CELECOXIB 200 MG: 200 CAPSULE ORAL at 08:12

## 2022-12-08 RX ADMIN — POLYETHYLENE GLYCOL 3350 17 G: 17 POWDER, FOR SOLUTION ORAL at 08:12

## 2022-12-08 RX ADMIN — CEFAZOLIN 2 G: 2 INJECTION, POWDER, FOR SOLUTION INTRAMUSCULAR; INTRAVENOUS at 12:12

## 2022-12-08 RX ADMIN — SENNOSIDES AND DOCUSATE SODIUM 1 TABLET: 50; 8.6 TABLET ORAL at 08:12

## 2022-12-08 RX ADMIN — ACETAMINOPHEN 1000 MG: 500 TABLET ORAL at 06:12

## 2022-12-08 RX ADMIN — FAMOTIDINE 20 MG: 20 TABLET ORAL at 08:12

## 2022-12-08 NOTE — DISCHARGE SUMMARY
[facility] - Recovery (Hospital)  Discharge Note  Short Stay    Procedure(s) (LRB):  ARTHROPLASTY shoulder reverse (Right)      OUTCOME: Patient tolerated treatment/procedure well without complication and is now ready for discharge.    DISPOSITION: Home or Self Care    FINAL DIAGNOSIS: Right shoulder osteoarthritis    FOLLOWUP: In clinic    DISCHARGE INSTRUCTIONS:    Discharge Procedure Orders   Discharge diet   Order Comments: Eat a bland diet for the first day after surgery. Progress your diet as tolerated. Constipation may occur with Narcotic usage, contact our office if you are experiencing constipation.     Notify physician   Order Comments: Call the doctor's office immediately if you experience any of the following:  - Excessive bleeding or pus like drainage at the incision site  - Uncontrollable pain not relieved by pain medication  - Excessive swelling or redness at the incision site  - Fever above 101.5 degrees not controlled with Tylenol or Motrin  - Shortness of Breath  - Any foul odor or blistering from the surgery site  - Persistent nausea and vomiting or diarrhea  - Difficulty breathing or increased cough  - Severe persistent headache  - Persistent dizziness, light-headedness, or visual disturbances  - Increased confusion or weakness     Change dressing - Aquacel   Order Comments: Home health RN will change the dressing in 5 days     Discharge instructions - Pain Management Information   Order Comments: Pain Management: A cold therapy cuff, pain medications, local injections, and in some cases, regional anesthesia injections are used to manage your post-operative pain. The decision to use each of these options is based on their risks and benefits.  Medications: You were given one or more of the following medication prescriptions before leaving the hospital. Have the prescriptions filled at a pharmacy on your way home and follow the instructions on the bottles. If you need a refill, please call your  pharmacy.   Narcotic Medication (usually Vicodin ES, Lortab, Percocet or Nucynta): Begin taking the medication before your shoulder starts to hurt. Some patients do not like to take any medication, but if you wait until your pain is severe before taking, you will be very uncomfortable for several hours waiting for the narcotic to work. Always take with food.  Nausea / Vomiting: For this issue, we prescribe Phenergan, use this medication as directed.  Cold Therapy: You may have been sent home with a [facility] cold therapy unit and wrap for your shoulder. Fill with ice and water to the indicated fill line and use throughout the day for the first two days and then as needed to help relieve pain and control swelling.   Regional Anesthesia Injections (Blocks): You may have been given a regional nerve block either before or after surgery. This may make your entire shoulder numb for 24-36 hours.     Showering - Aquacel   Order Comments: You may shower after 24 hours     2:  Activity   Order Comments: Exercises to be performed 5 times per day for 5 minutes each time; do not remove sling until follow up visit with doctor; no abduction, no external rotation, non weight bearing, and no active ROM on shoulder  Exercises:   Ball Squeezes: Use ball attached to sling/pillow or soft (nerf) ball for  strengthening        TIME SPENT ON DISCHARGE: 30 minutes

## 2022-12-08 NOTE — PLAN OF CARE
Problem: Adult Inpatient Plan of Care  Goal: Plan of Care Review  12/8/2022 0811 by Lubna Alejo RN  Outcome: Ongoing, Progressing  Flowsheets (Taken 12/8/2022 0811)  Plan of Care Reviewed With:   patient   spouse     Problem: Adult Inpatient Plan of Care  Goal: Patient-Specific Goal (Individualized)  12/8/2022 0811 by Lubna Alejo RN  Outcome: Ongoing, Progressing  Flowsheets (Taken 12/8/2022 0811)  Anxieties, Fears or Concerns: None  Individualized Care Needs: Post op pain control  Patient-Specific Goals (Include Timeframe): Keep pt and family up to date     Problem: Pain (Shoulder Arthroplasty)  Goal: Acceptable Pain Control  Intervention: Prevent or Manage Pain  Flowsheets (Taken 12/8/2022 0811)  Pain Management Interventions:   cold applied   care clustered     Problem: Fall Injury Risk  Goal: Absence of Fall and Fall-Related Injury  Intervention: Identify and Manage Contributors  Flowsheets (Taken 12/8/2022 0811)  Medication Review/Management: medications reviewed      PT received AAO x 4. Bed locked and in lowest position. Oriented to room and callbell.   Non skid socks on while out of bed. Pt instructed to call for assistance, skin integrity maintained,  ice maintained . No other complaints or concerns, will continue to follow careplan. Callbell placed within reach and use encouraged.

## 2022-12-08 NOTE — NURSING
Discharge teaching completed. All questions answered. All belongings packed up including discharge folder. IV removed. Awaiting Transportation.

## 2022-12-08 NOTE — PT/OT/SLP EVAL
Physical Therapy Evaluation and Discharge Note    Patient Name:  Elena Willingham   MRN:  [id number]    Recommendations:     Discharge Recommendations: outpatient PT  Discharge Equipment Recommendations: none   Barriers to discharge: None    Assessment:     Elena Willingham is a 77 y.o. female admitted with a medical diagnosis of <principal problem not specified>. Patient tolerated PT session well. Patient ambulated 125ft x2 with no AD and supervision . No LOB or SOB noted. Patient ascended/descended 4 step with L hand rail going up and R hand rail going down and supervision. Maintaining RUE weight bearing precautions. Patient educated in weight bearing precautions. Patient has OPPT scheduled on 12/14/22. Patient ready to discharge home from PT standpoint.  At this time, patient is functioning at level of function safe to return home and does not require further acute PT services.     Recent Surgery: Procedure(s) (LRB):  ARTHROPLASTY shoulder reverse (Right) 1 Day Post-Op    Plan:     During this hospitalization, patient does not require further acute PT services.  Please re-consult if situation changes.      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   Prior to admission, patients level of function was independent.  Equipment used at home: none.  Upon discharge, patient will have assistance from family.    Objective:     Communicated with nurse prior to session.  Patient found up in chair with SCD upon PT entry to room.    General Precautions: Standard, fall    Orthopedic Precautions:Full weight bearing both  LE, RUE non weight bearing   Braces: UE Sling  Respiratory Status: Room air    Exams:  Cognitive Exam:  Patient is oriented to Person, Place, Time, and Situation  Sensation:    -       Intact  RLE ROM: WFL  RLE Strength: WFL  LLE ROM: WFL  LLE Strength: WFL    Functional Mobility:  Transfers:     Sit to Stand:  supervision with no AD  Gait: Patient ambulated 125ft x2 with No Assistive Device and supervision  using reciprocal gait. Patient demonstrated  reduced arm swing  during gait due to  orthopedic precautions and RUE sling .   Stairs:  Pt ascended/descended 4 stair(s) with No Assistive Device with left hand rail going up and right hand rail going down  with Supervision or Set-up Assistance.  Maintaining RUE WB precautions.    AM-PAC 6 CLICK MOBILITY  Total Score:24       Treatment and Education:  Patient educated in:  -PT role and POC  -safety with transfers including hand placement  -gait sequencing  -OOB activity to maximize recovery including ambulating at home to prevent DVT   -car transfer  -stair training  -sling positioning and management    AM-PAC 6 CLICK MOBILITY  Total Score:24     Patient left up in chair with all lines intact, call button in reach, and nurse notified.    GOALS:   Multidisciplinary Problems       Physical Therapy Goals       Not on file                    History:     Past Medical History:   Diagnosis Date    Acid reflux     Detached retina, left     Hypertension        Past Surgical History:   Procedure Laterality Date    mass removed Left     shoulder (benign)    PARTIAL HYSTERECTOMY      RETINAL DETACHMENT SURGERY Left        Time Tracking:     PT Received On: 12/08/22  PT Start Time: 1012     PT Stop Time: 1020  PT Total Time (min): 8 min     Billable Minutes: Evaluation 8      12/08/2022

## 2022-12-08 NOTE — PLAN OF CARE
Problem: Adult Inpatient Plan of Care  Goal: Plan of Care Review  Outcome: Ongoing, Progressing  Goal: Patient-Specific Goal (Individualized)  Outcome: Ongoing, Progressing  Goal: Absence of Hospital-Acquired Illness or Injury  Outcome: Ongoing, Progressing  Goal: Optimal Comfort and Wellbeing  Outcome: Ongoing, Progressing     Problem: Adjustment to Surgery (Shoulder Arthroplasty)  Goal: Optimal Coping  Outcome: Ongoing, Progressing     Problem: Infection (Shoulder Arthroplasty)  Goal: Absence of Infection Signs and Symptoms  Outcome: Ongoing, Progressing     POC reviewed with patient. All questions and concerns addressed. Fall/safety precautions implemented and maintained. IVF maintained. No acute events noted this shift. Please see flowsheet for full assessment and vitals. Bed locked in lowest position. Side rails up x2. Call bell within reach. Will continue to monitor.

## 2022-12-08 NOTE — PLAN OF CARE
Patient tolerated PT session well. Patient ambulated 125ft x2 with no AD and supervision . No LOB or SOB noted. Patient ascended/descended 4 step with L hand rail going up and R hand rail going down and supervision. Maintaining RUE weight bearing precautions. Patient educated in weight bearing precautions. Patient has OPPT scheduled on 12/14/22. Patient ready to discharge home from PT standpoint.  At this time, patient is functioning at level of function safe to return home and does not require further acute PT services.  Problem: Physical Therapy  Goal: Physical Therapy Goal  Outcome: Met

## 2022-12-08 NOTE — NURSING
Report received. Care assumed. Patient arrived to unit AAOx4 in hospital bed from PACU. VSS, IVF infusing. Dressing to (R) shoulder, CDI.  Pt lying supine in bed. Pt denies pain or any other concerns at this time. See assessment. Patient oriented to room. Bed in lowest position, side rails up x2, bed wheels locked and call light within reach.  Pt instructed to call for assistance, verbalized understanding. NADN. Will continue to monitor.

## 2022-12-08 NOTE — PLAN OF CARE
Problem: Occupational Therapy  Goal: Occupational Therapy Goal  Description: Pt is currently functioning near her baseline in ADLs and functional mobility post surgery. Therefore, further skilled OT intervention is not warranted at this time. Pt is appropriate to discharge home.   Outcome: Met   OT eval complete. Pt performed bed mobility with SBA, UBD with min-max (A), LBD with SPV, toileting with SPV, and toilet transfer with SBA.

## 2022-12-08 NOTE — ANESTHESIA POSTPROCEDURE EVALUATION
Anesthesia Post Evaluation    Patient: Elena Willingham    Procedure(s) Performed: Procedure(s) (LRB):  ARTHROPLASTY shoulder reverse (Right)    Final Anesthesia Type: general      Patient location during evaluation: PACU  Patient participation: Yes- Able to Participate  Level of consciousness: awake and alert and oriented  Post-procedure vital signs: reviewed and stable  Pain management: adequate  Airway patency: patent    PONV status at discharge: No PONV  Anesthetic complications: no      Cardiovascular status: hemodynamically stable  Respiratory status: nasal cannula  Hydration status: euvolemic  Follow-up not needed.          Vitals Value Taken Time   /66 12/07/22 2009   Temp 36.3 °C (97.3 °F) 12/07/22 2009   Pulse 65 12/07/22 2009   Resp 16 12/07/22 2009   SpO2 93 % 12/07/22 2009         Event Time   Out of Recovery 12:55:38         Pain/Josephine Score: Pain Rating Prior to Med Admin: 0 (12/7/2022  7:50 AM)  Josephine Score: 10 (12/7/2022 12:57 PM)

## 2022-12-08 NOTE — PT/OT/SLP EVAL
Occupational Therapy   Evaluation and Discharge Note    Name: Elena Willingham  MRN: [id number]  Admitting Diagnosis: <principal problem not specified>  Recent Surgery: Procedure(s) (LRB):  ARTHROPLASTY shoulder reverse (Right) 1 Day Post-Op    Recommendations:     Discharge Recommendations: home  Discharge Equipment Recommendations: bath bench  Barriers to discharge:  None    Assessment:     Elena Willingham is a 77 y.o. female with a medical diagnosis of <principal problem not specified>. Pt presents s/p R reverse TSA. She completed supine>sit with SBA, UBD, including UE sling, with min-max (A), LBD with SPV, toileting with SPV, and toilet transfer with SBA. She required ongoing cues to maintain NWB precautions throughout session with good carryover noted. She was able to recall all precautions end of session. Pt completed HEP with handout provided. OT addressed all of pt's questions/concerns within OT scope of practice. At this time, patient is functioning at their prior level of function and does not require further acute OT services. She is appropriate for D/C home.    Plan:     During this hospitalization, patient does not require further acute OT services.  Please re-consult if situation changes.    Plan of Care Reviewed with: patient    Subjective     Chief Complaint: pain/discomfort  Patient/Family Comments/goals: return to PLOF    Occupational Profile:  Living Environment: Pt lives alone in a H with 5 VIDAL and B HR (wide apart). She has a t/s combo with R GB and a raised toilet with L GB  Previous level of function: I PTA  Roles and Routines: pt (+) drives and works as a  part time  Equipment Used at home: grab bar, raised toilet  Assistance upon Discharge: daughter    Pain/Comfort:  Pain Rating 1: 0/10 (at rest)  Location - Side 1: Right  Location - Orientation 1: generalized  Location 1: shoulder  Pain Addressed 1: Pre-medicate for activity, Reposition, Distraction  Pain Rating  Post-Intervention 1:  (pt did not rate)    Patients cultural, spiritual, Jainism conflicts given the current situation: no    Objective:     Communicated with: Lubna DYSON prior to session.  Patient found HOB elevated with cryotherapy, SCD upon OT entry to room.    General Precautions: Standard, fall  Orthopedic Precautions: RUE non weight bearing (no external rotation, abduction)  Braces:  (UE sling with abduction pillow)  Respiratory Status: Room air     Occupational Performance:    Bed Mobility:    Patient completed Scooting/Bridging with stand by assistance  Patient completed Supine to Sit with stand by assistance  Cues to maintain NWB precautions    Functional Mobility/Transfers:  Patient completed Sit <> Stand Transfer with stand by assistance  with  no assistive device   Patient completed Toilet Transfer Step Transfer technique with stand by assistance with  no AD  Functional Mobility: pt completed functional ambulation EOB>toilet>bedside chair x2 trials with SBA and no AD  No LOB or SOB noted    Activities of Daily Living:  Grooming: modified independence to wash hands at sink  Upper Body Dressing: minimum assistance required to initiate threading long sleeved button up shirt on R hand and vc's to utilize L UE to (A); max (A) to don/doff UE sling with abduction pillow  Lower Body Dressing: supervision to thread B LE through underwear/pants and manipulate above hips in standing with L UE and no AD; SPV to doff non-skid socks and don shoes  Toileting: supervision as pt completed pericare from toilet    Cognitive/Visual Perceptual:  Cognitive/Psychosocial Skills:     -       Oriented to: Person, Place, Time, and Situation   -       Follows Commands/attention:Follows multistep  commands  -       Safety awareness/insight to disability: impaired   -       Mood/Affect/Coping skills/emotional control: Cooperative    Physical Exam:  Sensation:    -       Intact  light/touch B UE  Dominant hand:    -        right  Upper Extremity Range of Motion:     -       Right Upper Extremity: WFL except shoulder NT  -       Left Upper Extremity: WFL  Upper Extremity Strength:    -       Right Upper Extremity: WFL except shoulder-elbow NT  -       Left Upper Extremity: WFL   Strength:    -       Right Upper Extremity: WFL  -       Left Upper Extremity: WFL  Fine Motor Coordination:    -       Intact  Left hand thumb/finger opposition skills, Right hand thumb/finger opposition skills, Left hand, manipulation of objects, and Right hand, manipulation of objects    OT Exercises: AROM shoulder elevation/depression, scapular retraction; self PROM elbow flexion/extension and AROM wrist flexion/extension, hand open/closed, finger opposition. 2 x 10 reps    AMPAC 6 Click ADL:  AMPAC Total Score: 22    Treatment & Education:  -Pt educated to dress surgical site first and further dressing techniques s/p surgery  -Pt educated on hand placement for transfers  -Pt educated on proper foot wear s/p surgery  -Pt educated on car transfer technique  -Pt educated on positioning of sx UE during performance of functional activities vs rest/sleep  -Pt educated on UE sling wear schedule and technique to don/doff s/p surgery  -Pt educated on weightbearing and surgical precautions with pt verbalizing 3/3 correctly  -Pt educated to call for assistance and to transfer with hospital staff only  -Pt educated on HEP handout  -Pt educated on role of OT and plan of care s/p surgery at [facility], white board updated     Patient left up in chair with all lines intact, call button in reach, and RN notified    GOALS:   Multidisciplinary Problems       Occupational Therapy Goals       Not on file              Multidisciplinary Problems (Resolved)          Problem: Occupational Therapy    Goal Priority Disciplines Outcome Interventions   Occupational Therapy Goal   (Resolved)     OT, PT/OT Met    Description: Pt is currently functioning near her  baseline in ADLs and functional mobility post surgery. Therefore, further skilled OT intervention is not warranted at this time. Pt is appropriate to discharge home.                        History:     Past Medical History:   Diagnosis Date    Acid reflux     Detached retina, left     Hypertension          Past Surgical History:   Procedure Laterality Date    ARTHROPLASTY Right 12/7/2022    Procedure: ARTHROPLASTY shoulder reverse;  Surgeon: GAEL Woodson MD;  Location: [facility];  Service: Orthopedics;  Laterality: Right;  interscalene  regional w/o catheter    mass removed Left     shoulder (benign)    PARTIAL HYSTERECTOMY      RETINAL DETACHMENT SURGERY Left        Time Tracking:     OT Date of Treatment: 12/08/22  OT Start Time: 0927  OT Stop Time: 1004  OT Total Time (min): 37 min    Billable Minutes:Evaluation 10  Self Care/Home Management 17  Therapeutic Exercise 10    12/8/2022

## 2022-12-09 ENCOUNTER — TELEPHONE (OUTPATIENT)
Dept: SPORTS MEDICINE | Facility: CLINIC | Age: 77
End: 2022-12-09
Payer: MEDICARE

## 2022-12-09 DIAGNOSIS — Z96.611 S/P REVERSE TOTAL SHOULDER ARTHROPLASTY, RIGHT: Primary | ICD-10-CM

## 2022-12-09 NOTE — TELEPHONE ENCOUNTER
Home Health Order s/p rTSA   ----- Message from Michael Cardenas sent at 12/9/2022 11:27 AM CST -----  Regarding: PT IS CALLING TO SPEAK WITH STAFF REGARDING HOME HEALTH ORDER  Contact: PT  Confirmed contact info below:  Contact Name: Elena Willingham  Phone Number: [phone]

## 2022-12-15 ENCOUNTER — CLINICAL SUPPORT (OUTPATIENT)
Dept: REHABILITATION | Facility: HOSPITAL | Age: 77
End: 2022-12-15
Attending: PHYSICIAN ASSISTANT
Payer: MEDICARE

## 2022-12-15 DIAGNOSIS — M25.611 DECREASED RANGE OF MOTION OF RIGHT SHOULDER: ICD-10-CM

## 2022-12-15 DIAGNOSIS — M19.011 PRIMARY OSTEOARTHRITIS OF RIGHT SHOULDER: ICD-10-CM

## 2022-12-15 DIAGNOSIS — M25.511 RIGHT SHOULDER PAIN, UNSPECIFIED CHRONICITY: ICD-10-CM

## 2022-12-15 DIAGNOSIS — M75.21 BICIPITAL TENDINITIS OF RIGHT SHOULDER: ICD-10-CM

## 2022-12-15 PROCEDURE — 97161 PT EVAL LOW COMPLEX 20 MIN: CPT

## 2022-12-15 PROCEDURE — 97110 THERAPEUTIC EXERCISES: CPT

## 2022-12-15 NOTE — PLAN OF CARE
[facility]  Physical Therapy Initial Evaluation  [place] 1st Floor    Name: Elena Willingham  Clinic Number: [id number]    Therapy Diagnosis:   Encounter Diagnoses   Name Primary?    Primary osteoarthritis of right shoulder     Right shoulder pain, unspecified chronicity     Bicipital tendinitis of right shoulder     Decreased range of motion of right shoulder      Physician: Richard Hoffman, *    Physician Orders: PT Eval and Treat   Medical Diagnosis from Referral: Primary osteoarthritis of right shoulder   Right shoulder pain, unspecified chronicity   Bicipital tendinitis of right shoulder   Evaluation Date: 12/15/2022  Authorization Period Expiration: 01/01/2023 12/31/2023   Plan of Care Expiration: 3/15/23  Visit # / Visits authorized: 1/ 1    FOTO: 47%  FOTO 1st follow up:   FOTO 2nd follow up:     Time In: 1110  Time Out: 1210  Total Billable Time: 60 minutes    Precautions: Standard,     Subjective   Date of onset: 12/7/22  History of current condition - Elena reports: it has been rough. Prior to surgery, she could only lift to about 90 degrees flexion, her pain was bad, and had no IR behind back. Lives alone but has help and people to drive her and provide meals. Was super active before and performed all ADLs indep. 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 Took off 2 weeks and can remotely work does a lot of computer work but has not since surgery. Knows her precautions. States some irritation on brachium (I looked and no redness/warmth or notable irration). Feels she was unprepared for the amount of exhaustion that she has has for simple activities (putting on a shirt). Has been sleeping but last night was rough for her. She has been compliant with the sling and the ball squeezes.        Past Medical History:   Diagnosis Date    Acid reflux     Detached retina, left     Hypertension      Elena Willingham  has a past surgical  history that includes Retinal detachment surgery (Left); mass removed (Left); Partial hysterectomy; and Arthroplasty (Right, 12/7/2022).    Elena has a current medication list which includes the following prescription(s): amlodipine, ascorbic acid (vitamin c), aspirin, benzonatate, carboxymethylcellulose, cholecalciferol (vitamin d3), cranberry conc/ascorbic acid, cyanocobalamin, diclofenac sodium, dorzolamide-timolol 2-0.5%, esomeprazole, fluticasone propionate, folic acid, gabapentin, hydrocodone-acetaminophen, montelukast, ondansetron, and vitamin e.    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   Prior Level of Function: working, indep did have pain in shoulder decreased shoulder ROM  Current Level of Function: as expected post-op    Pain:  Current 4/10, worst 10/10, best 1/10   Location: R shoulder   Description: constant   Aggravating Factors: movement, just at rest  Easing Factors: pain meds and ice     Pts goals: return to driving    Objective     Observation: in abduction pillow and sling; otherwise normal healthy ambulatory 77 year old female    Posture:, pt in shoulder sling with abduction pillow    Passive Range of Motion:   Shoulder right   Flexion 80   Abduction    ER at 20    IR       Active Range of Motion:   Shoulder Left   Flexion 170   Abduction 170   ER at 0 70   ER at 90 80   IR (behind back) nt     Upper Extremity Strength:  Formal MMT not performed 2/2 Post-op and increased pain.      Joint Mobility: as expected post-op.    Palpation: as expected post-op. Not tenderness that is  out of ordinary    Sensation: Intact but diminished 2/2 residual nerve block.      CMS Impairment/Limitation/Restriction for FOTO Shoulder Survey    Therapist reviewed FOTO scores for Elena Willingham on 12/15/2022.   FOTO documents entered into [facility] - see Media section.    Limitation Score: 47%       TREATMENT   Treatment Time In: 1150  Treatment Time Out: 1210  Total Treatment time separate from Evaluation: 20 minutes    Table slide to 90 deg to tolerance 10x  Wrist flex/ext/sup/pron/UD/RD 10x  Scap squeeze 10x  Shrugs 10x    Home Exercises and Patient Education Provided    Education provided about:   - PT POC   - PT goals   - exercises/HEP  - post-op precautions    Written Home Exercises Provided:   Exercises were reviewed and Elena was able to demonstrate them prior to the end of the session.   Pt received a written copy of exercises to perform at home. Elena demonstrated good understanding of the education provided.     See EMR under patient instructions for exercises given.   Assessment   Elena is a 77 y.o. female referred to outpatient Physical Therapy with a medical diagnosis of Right shoulder pain, unspecified chronicity Bicipital tendinitis of right shoulder Primary osteoarthritis of right shoulder. Pt presents in sling with abduction pillow underneath her shoulder. She tolerated PROM flexion to 80 without symptoms. Had difficulty with table slides in both scaption and flexion so was held for her HEP. Readjusted sling and abd pillow and patient left comfortably.  Pt appropriate for PT.    Pt prognosis is Good.   Pt will benefit from skilled outpatient Physical Therapy to address the deficits stated above and in the chart below, provide pt/family education, and to maximize pt's level of independence.     Plan of care discussed with patient: Yes  Pt's spiritual, cultural and educational needs considered and patient is agreeable to the plan of care and goals as stated below:     Anticipated Barriers  for therapy: needs daughter to drive her    Medical Necessity is demonstrated by the following  History  Co-morbidities and personal factors that may impact the plan of care Co-morbidities:   advanced age    Personal Factors:   no deficits     Low   Examination  Body Structures and Functions, activity limitations and participation restrictions that may impact the plan of care Body Regions:   upper extremities    Body Systems:    gross symmetry  ROM  strength  gross coordinated movement  balance  gait  transfers  transitions  motor control  motor learning    Participation Restrictions:   None identified    Activity limitations:   no deficits    General Tasks and Commands  no deficits    Communication  no deficits    Mobility  lifting and carrying objects  fine hand use (grasping/picking up)  driving (bike, car, motorcycle)    Self care  no deficits    Domestic Life  no deficits    Interactions/Relationships  no deficits    Life Areas  no deficits    Community and Social Life  no deficits         Low   Clinical Presentation stable and uncomplicated Low   Decision Making/ Complexity Score: Low     Goals:  Short Term Goals: 6 weeks   1. Independent with HEP  2. Increase pain-free flexion PROM to 90 degrees   3. Increase pain-free ER PROM to 20 degrees   4. Pt reports R shoulder pain is </= 7/10    Long Term Goals: 10+ weeks   1. Increase pain-free R shoulder ER ROM to >/=30 degrees   2. Increase pain-free R shoulder flexion ROM to >/=130 degrees   3. Improve deltoid strength to >/=3+/5  4. Pt reports R shoulder pain is </= 2/10      Plan   Plan of care Certification: 12/15/2022 to 3/15/23    Outpatient Physical Therapy 2 times weekly for 10 weeks to include the following interventions: Gait Training, Manual Therapy, Moist Heat/ Ice, Neuromuscular Re-ed, Patient Education, Therapeutic Activites, Therapeutic Exercise, and Functional Dry Needling with/or without Electrical Stimulation as needed.     Ashli Marie, PT,  DPT

## 2022-12-19 ENCOUNTER — CLINICAL SUPPORT (OUTPATIENT)
Dept: REHABILITATION | Facility: HOSPITAL | Age: 77
End: 2022-12-19
Payer: MEDICARE

## 2022-12-19 DIAGNOSIS — M25.611 DECREASED RANGE OF MOTION OF RIGHT SHOULDER: Primary | ICD-10-CM

## 2022-12-19 PROCEDURE — 97140 MANUAL THERAPY 1/> REGIONS: CPT

## 2022-12-19 PROCEDURE — 97110 THERAPEUTIC EXERCISES: CPT

## 2022-12-19 NOTE — PROGRESS NOTES
"  Physical Therapy Treatment Note  [address]     Name: Elena Willingham  Clinic Number: [id number]    Therapy Diagnosis:   Encounter Diagnosis   Name Primary?    Decreased range of motion of right shoulder Yes     Physician: Richard Hoffman, *    Visit Date: 12/19/2022    Physician Orders: PT Eval and Treat   Medical Diagnosis from Referral: Primary osteoarthritis of right shoulder   Right shoulder pain, unspecified chronicity   Bicipital tendinitis of right shoulder   Evaluation Date: 12/15/2022  Authorization Period Expiration: 01/01/2023 12/31/2023   Plan of Care Expiration: 3/15/23  Visit # / Visits authorized: 1/ 1     FOTO: 47%  FOTO 1st follow up:   FOTO 2nd follow up:      Time In: 815  Time Out: 900  Total Billable Time: 45 minutes     Precautions: Standard, reverse tSA no subscap repair protocol    Subjective     Pt reports: been doing well not having a lot of pain and doing her HEP.  She was compliant with home exercise program.  Response to previous treatment: tolerated well  Functional change:     Pain: 1/10  Location: L shoulder    Procedure: 12/7/22 Left Reverse Total Shoulder Arthroplasty - Dr. Woodson    Objective     Elena received therapeutic exercises to develop strength, endurance, ROM, flexibility, posture and core stabilization for 25 minutes including:     Supine Dowel ER 3" x 20  Table slide to 90 deg to tolerance 20x  Wrist flex/ext/sup/pron/UD/RD 10x  Scap squeeze 20x  Shrugs 20x    Elena received the following manual therapy techniques: Joint mobilizations, Manual traction, Myofacial release, Soft tissue Mobilization, Friction Massage and Functional Dry Needling were applied for 20 minutes, including:    PROM R shoulder ER/flexion per protocol  PROM R elbow flex/ext    Home Exercises Provided and Patient Education Provided     Education provided:   - continue HEP     Written Home Exercises Provided: Patient instructed to cont prior HEP.  Exercises were reviewed and " Elena was able to demonstrate them prior to the end of the session.  Elena demonstrated good  understanding of the education provided.     See EMR under Patient Instructions for exercises provided from Initial Evaluation.    Assessment     Pt 1 week 5 days post-op and doing well has about 20 deg ER, free and easy and ~80 deg flexion. Her shoulder shrug and scap squeeze has improved from initial visit.     Elena is progressing towards her goals.   Pt prognosis is good    Pt will continue to benefit from skilled outpatient physical therapy to address the deficits listed in the problem list box on initial evaluation, provide pt/family education and to maximize pt's level of independence in the home and community environment.     Pt's spiritual, cultural and educational needs considered and pt agreeable to plan of care and goals.     Anticipated barriers to physical therapy: none identified    Goals:   Short Term Goals: 6 weeks   1. Independent with HEP  2. Increase pain-free flexion PROM to 90 degrees   3. Increase pain-free ER PROM to 20 degrees   4. Pt reports R shoulder pain is </= 7/10     Long Term Goals: 10+ weeks   1. Increase pain-free R shoulder ER ROM to >/=30 degrees   2. Increase pain-free R shoulder flexion ROM to >/=130 degrees   3. Improve deltoid strength to >/=3+/5  4. Pt reports R shoulder pain is </= 2/10       Plan     Continue with PT POC    Ashli Marie, PT, DPT

## 2022-12-20 ENCOUNTER — CLINICAL SUPPORT (OUTPATIENT)
Dept: REHABILITATION | Facility: HOSPITAL | Age: 77
End: 2022-12-20
Payer: MEDICARE

## 2022-12-20 DIAGNOSIS — M25.611 DECREASED RANGE OF MOTION OF RIGHT SHOULDER: Primary | ICD-10-CM

## 2022-12-20 PROCEDURE — 97110 THERAPEUTIC EXERCISES: CPT

## 2022-12-20 PROCEDURE — 97140 MANUAL THERAPY 1/> REGIONS: CPT

## 2022-12-20 NOTE — PROGRESS NOTES
"  Physical Therapy Treatment Note  [address]     [facility]  Cl[facility] Number: [id number]    Therapy Diagnosis:   Encounter Diagnosis   Name Primary?    Decreased range of motion of right shoulder Yes     Physician: Richard Hoffman, *    Visit Date: 12/20/2022    Physician Orders: PT Eval and Treat   Medical Diagnosis from Referral: Primary osteoarthritis of right shoulder   Right shoulder pain, unspecified chronicity   Bicipital tendinitis of right shoulder   Evaluation Date: 12/15/2022  Authorization Period Expiration: 01/01/2023 12/31/2023   Plan of Care Expiration: 3/15/23  Visit # / Visits authorized: 2/12     FOTO: 47%  FOTO 1st follow up:   FOTO 2nd follow up:      Time In: 800  Time Out: 8:54  Total Billable Time: 54 minutes     Precautions: Standard, reverse tSA no subscap repair protocol    Subjective     Pt reports: having some R wrist pain coming in today, started after therapy yesterday.  She was compliant with home exercise program.  Response to previous treatment: tolerated well  Functional change:     Pain: 1/10  Location: L shoulder    Procedure: 12/7/22 Left Reverse Total Shoulder Arthroplasty - Dr. Woodson  Time Post-OP: 1 week 6 days    Objective     Able to achieve 90 degrees of R shoulder flexion PROM, 40 degrees of R shoulder ER PROM at 0 and 20 degrees of abd    Elena received therapeutic exercises to develop strength, endurance, ROM, flexibility, posture and core stabilization for 30 minutes including:     Supine Dowel ER 3" x 20  Table walkouts 3x10 3" into flexion, nothing past 90 degrees  Table slide to 90 deg flexion 20x  Wrist flex/ext/sup/pron/UD/RD 10x  Scap squeeze 30x 3"  Shrugs 20x    Elena received the following manual therapy techniques: Joint mobilizations, Manual traction, Myofacial release, Soft tissue Mobilization, Friction Massage and Functional Dry Needling were applied for 24 minutes, including:    PROM R shoulder ER/flexion per protocol  PROM R " elbow flex/ext    Home Exercises Provided and Patient Education Provided     Education provided:   - continue HEP     Written Home Exercises Provided: Patient instructed to cont prior HEP.  Exercises were reviewed and Elena was able to demonstrate them prior to the end of the session.  Elena demonstrated good  understanding of the education provided.     See EMR under Patient Instructions for exercises provided from Initial Evaluation.    Assessment     Pt 1 week 6 days post-op, continues to progress well with gradual increase in R shoulder PROM. Pt notes discomfort with R shoulder ER but no sharp pain. Emphasized avoiding R shoulder IR, horiz abd. Reports understanding.     Elena is progressing towards her goals.   Pt prognosis is good    Pt will continue to benefit from skilled outpatient physical therapy to address the deficits listed in the problem list box on initial evaluation, provide pt/family education and to maximize pt's level of independence in the home and community environment.     Pt's spiritual, cultural and educational needs considered and pt agreeable to plan of care and goals.     Anticipated barriers to physical therapy: none identified    Goals:   Short Term Goals: 6 weeks   1. Independent with HEP  2. Increase pain-free flexion PROM to 90 degrees   3. Increase pain-free ER PROM to 20 degrees   4. Pt reports R shoulder pain is </= 7/10     Long Term Goals: 10+ weeks   1. Increase pain-free R shoulder ER ROM to >/=30 degrees   2. Increase pain-free R shoulder flexion ROM to >/=130 degrees   3. Improve deltoid strength to >/=3+/5  4. Pt reports R shoulder pain is </= 2/10       Plan     Continue with PT POC    Binu Glez, PT, DPT

## 2022-12-23 ENCOUNTER — HOSPITAL ENCOUNTER (OUTPATIENT)
Dept: RADIOLOGY | Facility: HOSPITAL | Age: 77
Discharge: HOME | End: 2022-12-23
Attending: PHYSICIAN ASSISTANT
Payer: MEDICARE

## 2022-12-23 ENCOUNTER — OFFICE VISIT (OUTPATIENT)
Dept: SPORTS MEDICINE | Facility: CLINIC | Age: 77
End: 2022-12-23
Payer: MEDICARE

## 2022-12-23 VITALS
BODY MASS INDEX: 24.59 KG/M2 | WEIGHT: 153 LBS | SYSTOLIC BLOOD PRESSURE: 140 MMHG | DIASTOLIC BLOOD PRESSURE: 97 MMHG | HEIGHT: 66 IN | HEART RATE: 112 BPM

## 2022-12-23 DIAGNOSIS — M19.011 PRIMARY OSTEOARTHRITIS OF RIGHT SHOULDER: ICD-10-CM

## 2022-12-23 DIAGNOSIS — Z96.611 S/P REVERSE TOTAL SHOULDER ARTHROPLASTY, RIGHT: ICD-10-CM

## 2022-12-23 DIAGNOSIS — M25.511 RIGHT SHOULDER PAIN, UNSPECIFIED CHRONICITY: ICD-10-CM

## 2022-12-23 DIAGNOSIS — M75.21 BICIPITAL TENDINITIS OF RIGHT SHOULDER: ICD-10-CM

## 2022-12-23 DIAGNOSIS — Z96.611 S/P REVERSE TOTAL SHOULDER ARTHROPLASTY, RIGHT: Primary | ICD-10-CM

## 2022-12-23 PROCEDURE — 3080F DIAST BP >= 90 MM HG: CPT | Mod: CPTII,S$GLB,, | Performed by: PHYSICIAN ASSISTANT

## 2022-12-23 PROCEDURE — 1159F PR MEDICATION LIST DOCUMENTED IN MEDICAL RECORD: ICD-10-PCS | Mod: CPTII,S$GLB,, | Performed by: PHYSICIAN ASSISTANT

## 2022-12-23 PROCEDURE — 1160F RVW MEDS BY RX/DR IN RCRD: CPT | Mod: CPTII,S$GLB,, | Performed by: PHYSICIAN ASSISTANT

## 2022-12-23 PROCEDURE — 3288F FALL RISK ASSESSMENT DOCD: CPT | Mod: CPTII,S$GLB,, | Performed by: PHYSICIAN ASSISTANT

## 2022-12-23 PROCEDURE — 99999 PR PBB SHADOW E&M-EST. PATIENT-LVL IV: ICD-10-PCS | Mod: PBBFAC,,, | Performed by: PHYSICIAN ASSISTANT

## 2022-12-23 PROCEDURE — 1160F PR REVIEW ALL MEDS BY PRESCRIBER/CLIN PHARMACIST DOCUMENTED: ICD-10-PCS | Mod: CPTII,S$GLB,, | Performed by: PHYSICIAN ASSISTANT

## 2022-12-23 PROCEDURE — 99024 PR POST-OP FOLLOW-UP VISIT: ICD-10-PCS | Mod: S$GLB,,, | Performed by: PHYSICIAN ASSISTANT

## 2022-12-23 PROCEDURE — 3077F SYST BP >= 140 MM HG: CPT | Mod: CPTII,S$GLB,, | Performed by: PHYSICIAN ASSISTANT

## 2022-12-23 PROCEDURE — 1101F PR PT FALLS ASSESS DOC 0-1 FALLS W/OUT INJ PAST YR: ICD-10-PCS | Mod: CPTII,S$GLB,, | Performed by: PHYSICIAN ASSISTANT

## 2022-12-23 PROCEDURE — 73030 XR SHOULDER COMPLETE 2 OR MORE VIEWS RIGHT: ICD-10-PCS | Mod: 26,RT,, | Performed by: RADIOLOGY

## 2022-12-23 PROCEDURE — 3288F PR FALLS RISK ASSESSMENT DOCUMENTED: ICD-10-PCS | Mod: CPTII,S$GLB,, | Performed by: PHYSICIAN ASSISTANT

## 2022-12-23 PROCEDURE — 99999 PR PBB SHADOW E&M-EST. PATIENT-LVL IV: CPT | Mod: PBBFAC,,, | Performed by: PHYSICIAN ASSISTANT

## 2022-12-23 PROCEDURE — 3077F PR MOST RECENT SYSTOLIC BLOOD PRESSURE >= 140 MM HG: ICD-10-PCS | Mod: CPTII,S$GLB,, | Performed by: PHYSICIAN ASSISTANT

## 2022-12-23 PROCEDURE — 3080F PR MOST RECENT DIASTOLIC BLOOD PRESSURE >= 90 MM HG: ICD-10-PCS | Mod: CPTII,S$GLB,, | Performed by: PHYSICIAN ASSISTANT

## 2022-12-23 PROCEDURE — 1159F MED LIST DOCD IN RCRD: CPT | Mod: CPTII,S$GLB,, | Performed by: PHYSICIAN ASSISTANT

## 2022-12-23 PROCEDURE — 1125F AMNT PAIN NOTED PAIN PRSNT: CPT | Mod: CPTII,S$GLB,, | Performed by: PHYSICIAN ASSISTANT

## 2022-12-23 PROCEDURE — 99024 POSTOP FOLLOW-UP VISIT: CPT | Mod: S$GLB,,, | Performed by: PHYSICIAN ASSISTANT

## 2022-12-23 PROCEDURE — 73030 X-RAY EXAM OF SHOULDER: CPT | Mod: TC,RT

## 2022-12-23 PROCEDURE — 1101F PT FALLS ASSESS-DOCD LE1/YR: CPT | Mod: CPTII,S$GLB,, | Performed by: PHYSICIAN ASSISTANT

## 2022-12-23 PROCEDURE — 73030 X-RAY EXAM OF SHOULDER: CPT | Mod: 26,RT,, | Performed by: RADIOLOGY

## 2022-12-23 PROCEDURE — 1125F PR PAIN SEVERITY QUANTIFIED, PAIN PRESENT: ICD-10-PCS | Mod: CPTII,S$GLB,, | Performed by: PHYSICIAN ASSISTANT

## 2022-12-23 RX ORDER — HYDROCODONE BITARTRATE AND ACETAMINOPHEN 10; 325 MG/1; MG/1
1 TABLET ORAL EVERY 12 HOURS PRN
Qty: 14 TABLET | Refills: 0 | Status: SHIPPED | OUTPATIENT
Start: 2022-12-23

## 2022-12-23 NOTE — PROGRESS NOTES
S:Elena Willingham presents for post-operative evaluation.     DATE OF PROCEDURE: 12/7/2022      PROCEDURES PERFORMED:   Right reverse shoulder arthroplasty(CPT 09370)  Right shoulder open biceps tenodesis (CPT 92492)     SURGEON:  RIKI Woodson MD     ASSISTANTS:  Rui Dallas MD - Fellow - First Assist  TONJA Case    Elena Willingham reports to be doing well 2wk s/p the above mentioned procedure. Denies fevers, chills, night sweats, chest pain, difficulty breathing, calf pain or tenderness. Going to PT 2xWeek at the [facility] location. Seeing good progress daily. Pain levels are improving. Has pain medication PRN.     O: The incisions are healing well.  No signs of infection.  Suture tails were removed. No significant pain or unusual tenderness.    Radiographs today: There is a right TSA in place good alignment no complication.    A/P: op report was reviewed with the patient. Plan to follow the rehab plan as previously outlined. RTC in 4 weeks.

## 2022-12-27 ENCOUNTER — CLINICAL SUPPORT (OUTPATIENT)
Dept: REHABILITATION | Facility: HOSPITAL | Age: 77
End: 2022-12-27
Payer: MEDICARE

## 2022-12-27 DIAGNOSIS — M25.611 DECREASED RANGE OF MOTION OF RIGHT SHOULDER: Primary | ICD-10-CM

## 2022-12-27 PROCEDURE — 97140 MANUAL THERAPY 1/> REGIONS: CPT

## 2022-12-27 NOTE — PROGRESS NOTES
"  Physical Therapy Treatment Note  [address]     [facility]  Cl[facility] Number: [id number]    Therapy Diagnosis:   Encounter Diagnosis   Name Primary?    Decreased range of motion of right shoulder Yes     Physician: Richard Hoffman, *    Visit Date: 12/27/2022    Physician Orders: PT Eval and Treat   Medical Diagnosis from Referral: Primary osteoarthritis of right shoulder   Right shoulder pain, unspecified chronicity   Bicipital tendinitis of right shoulder   Evaluation Date: 12/15/2022  Authorization Period Expiration: 01/01/2023 12/31/2023   Plan of Care Expiration: 3/15/23  Visit # / Visits authorized: 3 / 12     FOTO: 47%  FOTO 1st follow up:   FOTO 2nd follow up:      Time In: 100 pm   Time Out: 200 pm  Total Billable Time: 30 minutes 1:1     Precautions: Standard, reverse tSA no subscap repair protocol    Subjective     Pt reports: shoulder feels good. She is doing well.   She was compliant with home exercise program.  Response to previous treatment: tolerated well  Functional change:     Pain: 1/10  Location: L shoulder    Procedure: 12/7/22 Left Reverse Total Shoulder Arthroplasty - Dr. Woodson    Objective     Able to achieve 90 degrees of R shoulder flexion PROM, 40 degrees of R shoulder ER PROM at 0 and 20 degrees of abd    Elena received therapeutic exercises to develop strength, endurance, ROM, flexibility, posture and core stabilization for 30 minutes including:     Supine Dowel ER 3" x 20  Table walkouts 3x10 3" into flexion, nothing past 90 degrees  Table slide to 90 deg flexion 20x  Wrist flex/ext/sup/pron/UD/RD 10x  Scap squeeze 30x 3"  Shrugs 20x  Green putty  squeezes 30x    Elena received the following manual therapy techniques: Joint mobilizations, Manual traction, Myofacial release, Soft tissue Mobilization, Friction Massage and Functional Dry Needling were applied for 30 minutes, including:    PROM R shoulder ER/flexion per protocol  PROM R elbow " flex/ext    Home Exercises Provided and Patient Education Provided     Education provided:   - continue HEP     Written Home Exercises Provided: Patient instructed to cont prior HEP.  Exercises were reviewed and Elena was able to demonstrate them prior to the end of the session.  Elena demonstrated good  understanding of the education provided.     See EMR under Patient Instructions for exercises provided from Initial Evaluation.    Assessment     Pt 20 days postop, continues to progress well with gradual increase in R shoulder PROM. Issued green putty for  strength.     Elena is progressing towards her goals.   Pt prognosis is good    Pt will continue to benefit from skilled outpatient physical therapy to address the deficits listed in the problem list box on initial evaluation, provide pt/family education and to maximize pt's level of independence in the home and community environment.     Pt's spiritual, cultural and educational needs considered and pt agreeable to plan of care and goals.     Anticipated barriers to physical therapy: none identified    Goals:   Short Term Goals: 6 weeks   1. Independent with HEP  2. Increase pain-free flexion PROM to 90 degrees   3. Increase pain-free ER PROM to 20 degrees   4. Pt reports R shoulder pain is </= 7/10     Long Term Goals: 10+ weeks   1. Increase pain-free R shoulder ER ROM to >/=30 degrees   2. Increase pain-free R shoulder flexion ROM to >/=130 degrees   3. Improve deltoid strength to >/=3+/5  4. Pt reports R shoulder pain is </= 2/10       Plan     Continue with PT AUSTYN Marie, PT, DPT

## 2022-12-28 NOTE — PLAN OF CARE
[facility]  Discharge Final Note    Primary Care Provider: Raymond Delvalle MD    Expected Discharge Date: 12/8/2022    Final Discharge Note (most recent)       Final Note - 12/08/22 1102          Final Note    Assessment Type Final Discharge Note     Anticipated Discharge Disposition Home or Self Care     Hospital Resources/Appts/Education Provided Provided patient/caregiver with written discharge plan information;Appointments scheduled by Navigator/Coordinator

## 2022-12-29 ENCOUNTER — CLINICAL SUPPORT (OUTPATIENT)
Dept: REHABILITATION | Facility: HOSPITAL | Age: 77
End: 2022-12-29
Payer: MEDICARE

## 2022-12-29 DIAGNOSIS — M25.611 DECREASED RANGE OF MOTION OF RIGHT SHOULDER: Primary | ICD-10-CM

## 2022-12-29 PROCEDURE — 97140 MANUAL THERAPY 1/> REGIONS: CPT

## 2022-12-29 PROCEDURE — 97110 THERAPEUTIC EXERCISES: CPT

## 2022-12-29 NOTE — PROGRESS NOTES
"  Physical Therapy Treatment Note  [address]     [facility]  [facility] Number[facility] [id number]    Therapy Diagnosis:   Encounter Diagnosis   Name Primary?    Decreased range of motion of right shoulder Yes     Physician: Richard Hoffman, *    Visit Date: 12/29/2022    Physician Orders: PT Eval and Treat   Medical Diagnosis from Referral: Primary osteoarthritis of right shoulder   Right shoulder pain, unspecified chronicity   Bicipital tendinitis of right shoulder   Evaluation Date: 12/15/2022  Authorization Period Expiration: 01/01/2023 12/31/2023   Plan of Care Expiration: 3/15/23  Visit # / Visits authorized: 4 / 12     FOTO: 47%  FOTO 1st follow up:   FOTO 2nd follow up:      Time In: 11:00 am   Time Out: 11:54 am  Total Billable Time: 54 min 1:1     Precautions: Standard, reverse tSA no subscap repair protocol    Subjective     Pt reports: Feeling good, progressing well.   She was compliant with home exercise program.  Response to previous treatment: tolerated well  Functional change:     Pain: 1/10  Location: L shoulder    Procedure: 12/7/22 Left Reverse Total Shoulder Arthroplasty - Dr. Woodson    Objective     Able to achieve 90 degrees of R shoulder flexion PROM, 40 degrees of R shoulder ER PROM at 0 and 20 degrees of abd    Elena received therapeutic exercises to develop strength, endurance, ROM, flexibility, posture and core stabilization for 28 minutes including:     Supine Dowel ER 3" x 20  Shoulder AAROM overhead to approx 90 deg 3x10  Table walkouts 3x10 3" into flexion, nothing past 90 degrees  Table slide to 90 deg flexion 20x  Wrist flex/ext/sup/pron/UD/RD 10x  Scap squeeze 30x 3"  Shrugs 20x  Green putty  squeezes 30x    Elena received the following manual therapy techniques: Joint mobilizations, Manual traction, Myofacial release, Soft tissue Mobilization, Friction Massage and Functional Dry Needling were applied for 26 minutes, including:    PROM R shoulder ER/flexion per " protocol  PROM R elbow flex/ext    Home Exercises Provided and Patient Education Provided     Education provided:   - continue HEP     Written Home Exercises Provided: Patient instructed to cont prior HEP.  Exercises were reviewed and Elena was able to demonstrate them prior to the end of the session.  Elena demonstrated good  understanding of the education provided.     See EMR under Patient Instructions for exercises provided from Initial Evaluation.    Assessment     22 days post op. Continues to progress well. Empty end feel with no pain noted at approx 30 deg R shoulder ER, 100 deg R shoulder flexion. Pt continues to be educated on avoiding R shoulder I, horiz Add movements to ensure she is preventing dislocation. Demonstrates good understanding.     Elena is progressing towards her goals.   Pt prognosis is good    Pt will continue to benefit from skilled outpatient physical therapy to address the deficits listed in the problem list box on initial evaluation, provide pt/family education and to maximize pt's level of independence in the home and community environment.     Pt's spiritual, cultural and educational needs considered and pt agreeable to plan of care and goals.     Anticipated barriers to physical therapy: none identified    Goals:   Short Term Goals: 6 weeks   1. Independent with HEP  2. Increase pain-free flexion PROM to 90 degrees   3. Increase pain-free ER PROM to 20 degrees   4. Pt reports R shoulder pain is </= 7/10     Long Term Goals: 10+ weeks   1. Increase pain-free R shoulder ER ROM to >/=30 degrees   2. Increase pain-free R shoulder flexion ROM to >/=130 degrees   3. Improve deltoid strength to >/=3+/5  4. Pt reports R shoulder pain is </= 2/10       Plan     Continue with PT POC    Binu Glez, PT, DPT

## 2022-12-29 NOTE — PROGRESS NOTES
"  Physical Therapy Treatment Note  [address]     [facility]  [facility] [id number]    Therapy Diagnosis:   Encounter Diagnosis   Name Primary?    Decreased range of motion of right shoulder Yes     Physician: Richard Hoffman, *    Visit Date: 12/29/2022    Physician Orders: PT Eval and Treat   Medical Diagnosis from Referral: Primary osteoarthritis of right shoulder   Right shoulder pain, unspecified chronicity   Bicipital tendinitis of right shoulder   Evaluation Date: 12/15/2022  Authorization Period Expiration: 01/01/2023 12/31/2023   Plan of Care Expiration: 3/15/23  Visit # / Visits authorized: 3 / 12     FOTO: 47%  FOTO 1st follow up:   FOTO 2nd follow up:      Time In: 100 pm   Time Out: 200 pm  Total Billable Time: 30 minutes 1:1     Precautions: Standard, reverse tSA no subscap repair protocol    Subjective     Pt reports: shoulder feels good. She is doing well.   She was compliant with home exercise program.  Response to previous treatment: tolerated well  Functional change:     Pain: 1/10  Location: L shoulder    Procedure: 12/7/22 Left Reverse Total Shoulder Arthroplasty - Dr. Woodson    Objective     Able to achieve 90 degrees of R shoulder flexion PROM, 40 degrees of R shoulder ER PROM at 0 and 20 degrees of abd    Elena received therapeutic exercises to develop strength, endurance, ROM, flexibility, posture and core stabilization for 30 minutes including:     Supine Dowel ER 3" x 20  Table walkouts 3x10 3" into flexion, nothing past 90 degrees  Table slide to 90 deg flexion 20x  Wrist flex/ext/sup/pron/UD/RD 10x  Scap squeeze 30x 3"  Shrugs 20x  Green putty  squeezes 30x    Elena received the following manual therapy techniques: Joint mobilizations, Manual traction, Myofacial release, Soft tissue Mobilization, Friction Massage and Functional Dry Needling were applied for 30 minutes, including:    PROM R shoulder ER/flexion per protocol  PROM R elbow " flex/ext    Home Exercises Provided and Patient Education Provided     Education provided:   - continue HEP     Written Home Exercises Provided: Patient instructed to cont prior HEP.  Exercises were reviewed and Elena was able to demonstrate them prior to the end of the session.  Elena demonstrated good  understanding of the education provided.     See EMR under Patient Instructions for exercises provided from Initial Evaluation.    Assessment     Pt 22 days postop, continues to progress well with gradual increase in R shoulder PROM. Issued green putty for  strength.     Elena is progressing towards her goals.   Pt prognosis is good    Pt will continue to benefit from skilled outpatient physical therapy to address the deficits listed in the problem list box on initial evaluation, provide pt/family education and to maximize pt's level of independence in the home and community environment.     Pt's spiritual, cultural and educational needs considered and pt agreeable to plan of care and goals.     Anticipated barriers to physical therapy: none identified    Goals:   Short Term Goals: 6 weeks   1. Independent with HEP  2. Increase pain-free flexion PROM to 90 degrees   3. Increase pain-free ER PROM to 20 degrees   4. Pt reports R shoulder pain is </= 7/10     Long Term Goals: 10+ weeks   1. Increase pain-free R shoulder ER ROM to >/=30 degrees   2. Increase pain-free R shoulder flexion ROM to >/=130 degrees   3. Improve deltoid strength to >/=3+/5  4. Pt reports R shoulder pain is </= 2/10       Plan     Continue with PT AUSTYN Marie, PT, DPT

## 2023-01-03 ENCOUNTER — CLINICAL SUPPORT (OUTPATIENT)
Dept: REHABILITATION | Facility: HOSPITAL | Age: 78
End: 2023-01-03
Payer: MEDICARE

## 2023-01-03 DIAGNOSIS — M25.611 DECREASED RANGE OF MOTION OF RIGHT SHOULDER: Primary | ICD-10-CM

## 2023-01-03 PROCEDURE — 97140 MANUAL THERAPY 1/> REGIONS: CPT

## 2023-01-03 NOTE — PROGRESS NOTES
"  Physical Therapy Treatment Note  [address]     [facility]  [facility] [id number]    Therapy Diagnosis:   Encounter Diagnosis   Name Primary?    Decreased range of motion of right shoulder Yes     Physician: Richard Hoffman, *    Visit Date: 1/3/2023    Physician Orders: PT Eval and Treat   Medical Diagnosis from Referral: Primary osteoarthritis of right shoulder   Right shoulder pain, unspecified chronicity   Bicipital tendinitis of right shoulder   Evaluation Date: 12/15/2022  Authorization Period Expiration: 01/01/2023 12/31/2023   Plan of Care Expiration: 3/15/23  Visit # / Visits authorized: 4 / 12     FOTO: 47%  FOTO 1st follow up:   FOTO 2nd follow up:      Time In: 11:00 am   Time Out: 11:54 am  Total Billable Time: 30 min 1:1     Precautions: Standard, reverse tSA no subscap repair protocol    Subjective     Pt reports: feeling stiffer that.   She was compliant with home exercise program.  Response to previous treatment: tolerated well  Functional change: stiffness    Pain: 1/10  Location: L shoulder    Procedure: 12/7/22 Left Reverse Total Shoulder Arthroplasty - Dr. Woodson    Objective     Able to achieve 90 degrees of R shoulder flexion PROM, 40 degrees of R shoulder ER PROM at 0 and 20 degrees of abd    Elena received therapeutic exercises to develop strength, endurance, ROM, flexibility, posture and core stabilization for 30 minutes including:     Supine Dowel ER 3" x 20  Shoulder AAROM overhead to approx 90 deg 3x10  Table walkouts 3x10 3" into flexion, nothing past 90 degrees  Table slide to 90 deg flexion 20x  Wrist flex/ext/sup/pron/UD/RD 10x  Scap squeeze 30x 3"  Shrugs 20x    Elena received the following manual therapy techniques: Joint mobilizations, Manual traction, Myofacial release, Soft tissue Mobilization, Friction Massage and Functional Dry Needling were applied for 24 minutes, including:    PROM R shoulder ER/flexion per protocol  PROM R elbow " flex/ext    Home Exercises Provided and Patient Education Provided     Education provided:   - continue HEP     Written Home Exercises Provided: Patient instructed to cont prior HEP.  Exercises were reviewed and Elena was able to demonstrate them prior to the end of the session.  Elena demonstrated good  understanding of the education provided.     See EMR under Patient Instructions for exercises provided from Initial Evaluation.    Assessment     Patient presenting a little stiff today in flexion. She tolerated all exercises today and is still progressing appropriately.     Elena is progressing towards her goals.   Pt prognosis is good    Pt will continue to benefit from skilled outpatient physical therapy to address the deficits listed in the problem list box on initial evaluation, provide pt/family education and to maximize pt's level of independence in the home and community environment.     Pt's spiritual, cultural and educational needs considered and pt agreeable to plan of care and goals.     Anticipated barriers to physical therapy: none identified    Goals:   Short Term Goals: 6 weeks   1. Independent with HEP  2. Increase pain-free flexion PROM to 90 degrees   3. Increase pain-free ER PROM to 20 degrees   4. Pt reports R shoulder pain is </= 7/10     Long Term Goals: 10+ weeks   1. Increase pain-free R shoulder ER ROM to >/=30 degrees   2. Increase pain-free R shoulder flexion ROM to >/=130 degrees   3. Improve deltoid strength to >/=3+/5  4. Pt reports R shoulder pain is </= 2/10       Plan     Continue with PT POC    Ashli Marie, PT, DPT

## 2023-01-05 ENCOUNTER — CLINICAL SUPPORT (OUTPATIENT)
Dept: REHABILITATION | Facility: HOSPITAL | Age: 78
End: 2023-01-05
Payer: MEDICARE

## 2023-01-05 DIAGNOSIS — M25.611 DECREASED RANGE OF MOTION OF RIGHT SHOULDER: Primary | ICD-10-CM

## 2023-01-05 PROCEDURE — 97110 THERAPEUTIC EXERCISES: CPT

## 2023-01-05 PROCEDURE — 97140 MANUAL THERAPY 1/> REGIONS: CPT

## 2023-01-05 NOTE — PROGRESS NOTES
"  Physical Therapy Treatment Note  [address]     [facility]  [facility] [id number]    Therapy Diagnosis:   Encounter Diagnosis   Name Primary?    Decreased range of motion of right shoulder Yes     Physician: Richard Hoffman, *    Visit Date: 1/5/2023    Physician Orders: PT Eval and Treat   Medical Diagnosis from Referral: Primary osteoarthritis of right shoulder   Right shoulder pain, unspecified chronicity   Bicipital tendinitis of right shoulder   Evaluation Date: 12/15/2022  Authorization Period Expiration: 01/01/2023 12/31/2023   Plan of Care Expiration: 3/15/23  Visit # / Visits authorized: 6 / 12     FOTO: 47%  FOTO 1st follow up:   FOTO 2nd follow up:      Time In: 11:08 am   Time Out: 11:54 am  Total Billable Time: 46 min 1:1     Precautions: Standard, reverse tSA no subscap repair protocol    Subjective     Pt reports: a little sore from doing her Hep   She was compliant with home exercise program.  Response to previous treatment: tolerated well  Functional change: stiffness    Pain: 1/10  Location: L shoulder    Procedure: 12/7/22 Left Reverse Total Shoulder Arthroplasty - Dr. Woodson    Objective       Elena received therapeutic exercises to develop strength, endurance, ROM, flexibility, posture and core stabilization for 30 minutes including:     Supine Dowel ER 3" x 20  Shoulder AAROM overhead to approx 90 deg 3x10  Table walkouts 3x10 3" into flexion, nothing past 90 degrees  Table slide to 90 deg flexion 20x  Wrist flex/ext/sup/pron/UD/RD 10x  Scap squeeze 30x 3"  Shrugs 20x    Elena received the following manual therapy techniques: Joint mobilizations, Manual traction, Myofacial release, Soft tissue Mobilization, Friction Massage and Functional Dry Needling were applied for 16 minutes, including:    PROM R shoulder ER/flexion per protocol  PROM R elbow flex/ext    Home Exercises Provided and Patient Education Provided     Education provided:   - continue HEP "     Written Home Exercises Provided: Patient instructed to cont prior HEP.  Exercises were reviewed and Elena was able to demonstrate them prior to the end of the session.  Elena demonstrated good  understanding of the education provided.     See EMR under Patient Instructions for exercises provided from Initial Evaluation.    Assessment     Patient 4 weeks 1 day. She tolerated all exercises today and is still progressing appropriately.     Elena is progressing towards her goals.   Pt prognosis is good    Pt will continue to benefit from skilled outpatient physical therapy to address the deficits listed in the problem list box on initial evaluation, provide pt/family education and to maximize pt's level of independence in the home and community environment.     Pt's spiritual, cultural and educational needs considered and pt agreeable to plan of care and goals.     Anticipated barriers to physical therapy: none identified    Goals:   Short Term Goals: 6 weeks   1. Independent with HEP  2. Increase pain-free flexion PROM to 90 degrees   3. Increase pain-free ER PROM to 20 degrees   4. Pt reports R shoulder pain is </= 7/10     Long Term Goals: 10+ weeks   1. Increase pain-free R shoulder ER ROM to >/=30 degrees   2. Increase pain-free R shoulder flexion ROM to >/=130 degrees   3. Improve deltoid strength to >/=3+/5  4. Pt reports R shoulder pain is </= 2/10       Plan     Continue with PT POC    Ashli Marie, PT, DPT

## 2023-01-10 ENCOUNTER — CLINICAL SUPPORT (OUTPATIENT)
Dept: REHABILITATION | Facility: HOSPITAL | Age: 78
End: 2023-01-10
Payer: MEDICARE

## 2023-01-10 DIAGNOSIS — M25.611 DECREASED RANGE OF MOTION OF RIGHT SHOULDER: Primary | ICD-10-CM

## 2023-01-10 PROCEDURE — 97110 THERAPEUTIC EXERCISES: CPT

## 2023-01-10 PROCEDURE — 97140 MANUAL THERAPY 1/> REGIONS: CPT

## 2023-01-10 NOTE — PROGRESS NOTES
"  Physical Therapy Treatment Note  [address]     [facility]  [facility] Number[facility] [id number]    Therapy Diagnosis:   Encounter Diagnosis   Name Primary?    Decreased range of motion of right shoulder Yes     Physician: Richard Hoffman, *    Visit Date: 1/10/2023    Physician Orders: PT Eval and Treat   Medical Diagnosis from Referral: Primary osteoarthritis of right shoulder   Right shoulder pain, unspecified chronicity   Bicipital tendinitis of right shoulder   Evaluation Date: 12/15/2022  Authorization Period Expiration: 01/01/2023 12/31/2023   Plan of Care Expiration: 3/15/23  Visit # / Visits authorized: 7 / 12     FOTO: 47%  FOTO 1st follow up:   FOTO 2nd follow up:      Time In: 100 PM    Time Out: 155 PM  Total Billable Time: 55 min 1:1     Precautions: Standard, reverse tSA no subscap repair protocol    Subjective     Pt reports: a little sore from doing her Hep   She was compliant with home exercise program.  Response to previous treatment: tolerated well  Functional change: stiffness    Pain: 1/10  Location: L shoulder    Procedure: 12/7/22 Left Reverse Total Shoulder Arthroplasty - Dr. Woodson    Objective     Elena received therapeutic exercises to develop strength, endurance, ROM, flexibility, posture and core stabilization for 40 minutes including:     Table slide flexion / scaption 20x  Ball flexion walkouts 30x  Supine Dowel ER  Neutral 3" x 20  45 deg ABD 3" x 20  Shoulder AAROM overhead to approx 90 deg 3 x 10  OTD pulleys Flex/Scaption 2 min each   Table walkouts flex/abd 20 x 3"     Elena received the following manual therapy techniques: Joint mobilizations, Manual traction, Myofacial release, Soft tissue Mobilization, Friction Massage and Functional Dry Needling were applied for 15 minutes, including:    PROM R shoulder ER/flexion per protocol  PROM R elbow flex/ext    Home Exercises Provided and Patient Education Provided     Education provided:   - continue HEP "     Written Home Exercises Provided: Patient instructed to cont prior HEP.  Exercises were reviewed and Elena was able to demonstrate them prior to the end of the session.  Elena demonstrated good  understanding of the education provided.     See EMR under Patient Instructions for exercises provided from Initial Evaluation.    Assessment     Patient 4 weeks 6 days. She presented a little stiff today. She tolerated minor progressions was fatigued but still progressing appropriately.     Elena is progressing towards her goals.   Pt prognosis is good    Pt will continue to benefit from skilled outpatient physical therapy to address the deficits listed in the problem list box on initial evaluation, provide pt/family education and to maximize pt's level of independence in the home and community environment.     Pt's spiritual, cultural and educational needs considered and pt agreeable to plan of care and goals.     Anticipated barriers to physical therapy: none identified    Goals:   Short Term Goals: 6 weeks   1. Independent with HEP - MET   2. Increase pain-free flexion PROM to 90 degrees - MET   3. Increase pain-free ER PROM to 20 degrees - MET   4. Pt reports R shoulder pain is </= 7/10 - MET      Long Term Goals: 10+ weeks   1. Increase pain-free R shoulder ER ROM to >/=30 degrees   2. Increase pain-free R shoulder flexion ROM to >/=130 degrees   3. Improve deltoid strength to >/=3+/5  4. Pt reports R shoulder pain is </= 2/10       Plan     Continue with PT POC    Ashli Marie, PT, DPT

## 2023-01-12 ENCOUNTER — CLINICAL SUPPORT (OUTPATIENT)
Dept: REHABILITATION | Facility: HOSPITAL | Age: 78
End: 2023-01-12
Payer: MEDICARE

## 2023-01-12 DIAGNOSIS — M25.611 DECREASED RANGE OF MOTION OF RIGHT SHOULDER: Primary | ICD-10-CM

## 2023-01-12 PROCEDURE — 97140 MANUAL THERAPY 1/> REGIONS: CPT

## 2023-01-12 PROCEDURE — 97110 THERAPEUTIC EXERCISES: CPT

## 2023-01-12 NOTE — PROGRESS NOTES
"  Physical Therapy Treatment Note  [address]     [facility]  [facility] Number: [id number]    Therapy Diagnosis:   Encounter Diagnosis   Name Primary?    Decreased range of motion of right shoulder Yes     Physician: Richard Hoffman, *    Visit Date: 1/12/2023    Physician Orders: PT Eval and Treat   Medical Diagnosis from Referral: Primary osteoarthritis of right shoulder   Right shoulder pain, unspecified chronicity   Bicipital tendinitis of right shoulder   Evaluation Date: 12/15/2022  Authorization Period Expiration: 01/01/2023 12/31/2023   Plan of Care Expiration: 3/15/23  Visit # / Visits authorized: 4 / 20     FOTO: 47%  FOTO 1st follow up:   FOTO 2nd follow up:      Time In: 1100 PM    Time Out: 1153 PM  Total Billable Time: 53 min 1:1     Precautions: Standard, reverse tSA no subscap repair protocol    Subjective     Pt reports: felt great after last session.   She was compliant with home exercise program.  Response to previous treatment: tolerated well  Functional change: stiffness    Pain: 1/10  Location: L shoulder    Procedure: 12/7/22 Left Reverse Total Shoulder Arthroplasty - Dr. Woodson    Objective     Elena received therapeutic exercises to develop strength, endurance, ROM, flexibility, posture and core stabilization for 40 minutes including:     Table slide flexion / scaption 20x  Ball flexion walkouts 30x  Supine Dowel ER  Neutral 3" x 20  45 deg ABD 3" x 20  Shoulder AAROM overhead to approx 90 deg 3 x 10  OTD pulleys Flex/Scaption 2 min each   Table walkouts flex/abd 20 x 3"     Elena received the following manual therapy techniques: Joint mobilizations, Manual traction, Myofacial release, Soft tissue Mobilization, Friction Massage and Functional Dry Needling were applied for 13 minutes, including:    PROM R shoulder ER/flexion per protocol  PROM R elbow flex/ext    Home Exercises Provided and Patient Education Provided     Education provided:   - continue HEP "     Written Home Exercises Provided: Patient instructed to cont prior HEP.  Exercises were reviewed and Elena was able to demonstrate them prior to the end of the session.  Elena demonstrated good  understanding of the education provided.     See EMR under Patient Instructions for exercises provided from Initial Evaluation.    Assessment     Patient 5 weeks. Did well after last session's progressions. Continue to progress per protocol. Sees. Dr. Woodson next week.      Elena is progressing towards her goals.   Pt prognosis is good    Pt will continue to benefit from skilled outpatient physical therapy to address the deficits listed in the problem list box on initial evaluation, provide pt/family education and to maximize pt's level of independence in the home and community environment.     Pt's spiritual, cultural and educational needs considered and pt agreeable to plan of care and goals.     Anticipated barriers to physical therapy: none identified    Goals:   Short Term Goals: 6 weeks   1. Independent with HEP - MET   2. Increase pain-free flexion PROM to 90 degrees - MET   3. Increase pain-free ER PROM to 20 degrees - MET   4. Pt reports R shoulder pain is </= 7/10 - MET      Long Term Goals: 10+ weeks   1. Increase pain-free R shoulder ER ROM to >/=30 degrees   2. Increase pain-free R shoulder flexion ROM to >/=130 degrees   3. Improve deltoid strength to >/=3+/5  4. Pt reports R shoulder pain is </= 2/10       Plan     Continue with PT POC    Ashli Marie, PT, DPT

## 2023-01-17 ENCOUNTER — CLINICAL SUPPORT (OUTPATIENT)
Dept: REHABILITATION | Facility: HOSPITAL | Age: 78
End: 2023-01-17
Payer: MEDICARE

## 2023-01-17 DIAGNOSIS — M25.611 DECREASED RANGE OF MOTION OF RIGHT SHOULDER: Primary | ICD-10-CM

## 2023-01-17 PROCEDURE — 97110 THERAPEUTIC EXERCISES: CPT | Mod: CQ

## 2023-01-17 PROCEDURE — 97140 MANUAL THERAPY 1/> REGIONS: CPT | Mod: CQ

## 2023-01-17 NOTE — PROGRESS NOTES
"  Physical Therapy Treatment Note  [address]     Name: Elena OSHEA [facility] Number: [id number]    Therapy Diagnosis:   Encounter Diagnosis   Name Primary?    Decreased range of motion of right shoulder Yes     Physician: Richard Hoffman, *    Visit Date: 1/17/2023    Physician Orders: PT Eval and Treat   Medical Diagnosis from Referral: Primary osteoarthritis of right shoulder   Right shoulder pain, unspecified chronicity   Bicipital tendinitis of right shoulder   Evaluation Date: 12/15/2022  Authorization Period Expiration: 01/01/2023 12/31/2023   Plan of Care Expiration: 3/15/23  Visit # / Visits authorized: 5/20     FOTO: 47%  FOTO 1st follow up:   FOTO 2nd follow up:      Time In: 1109  Time Out: 1206  Total Billable Time: 53 minutes     Precautions: Standard     Procedure: 12/7/22 Chintan  Right reverse shoulder arthroplasty  Right shoulder open biceps tenodesis    Subjective     Pt reports: she is having difficulty sleeping and is ready to get rid of the sling. Reports compliance with HEP. [place] CAIN on Thursday. Presents in sling.    She was compliant with home exercise program.  Response to previous treatment: tolerated well  Functional change: stiffness    Pain: 1/10  Location: L shoulder    Objective     DOS: 12/7/22  POD: 5 weeks, 6 days    Elena received therapeutic exercises to develop strength, endurance, ROM, flexibility, posture and core stabilization for 45 minutes including:     Table slide flexion / scaption x 4' each  Pulleys (scaption) x 4'  Supine AA dowel chest press 3 x 10  Supine AA dowel flexion to tolerance 3 x 10  Supine AROM chest press 3 x 8  Rows OTB 3 x 10 x 3"  Ext OTB 3 x 8   Flexion walkaway stretch 10 x 10"  Landmines to 70deg 2 x 8    Next tx - FOTO    NP:  Ball flexion walkouts 30x  Supine Dowel ER  Neutral 3" x 20  45 deg ABD 3" x 20  Table walkouts flex/abd 20 x 3"     Elena received the following manual therapy techniques: Joint mobilizations, Manual " traction, Myofacial release, Soft tissue Mobilization, Friction Massage and Functional Dry Needling were applied for 08 minutes, including:    Oscillations for pain/guarding (gr I)  PROM R shoulder ER/flexion per protocol      Home Exercises Provided and Patient Education Provided     Education provided:   - continue HEP     Written Home Exercises Provided: Patient instructed to cont prior HEP.  Exercises were reviewed and Elena was able to demonstrate them prior to the end of the session.  Elena demonstrated good  understanding of the education provided.     See EMR under Patient Instructions for exercises provided from Initial Evaluation.    Assessment     Elena did great today. She demonstrated good tolerance to initiation of short lever AROM and standing OH AAROM. Min cueing to avoid compensatory shrug during rows/ext. Encouraged continued compliance with HEP; pt voiced understanding. No adverse effects reported p tx.     Elena is progressing towards her goals.   Pt prognosis is good    Pt will continue to benefit from skilled outpatient physical therapy to address the deficits listed in the problem list box on initial evaluation, provide pt/family education and to maximize pt's level of independence in the home and community environment.     Pt's spiritual, cultural and educational needs considered and pt agreeable to plan of care and goals.     Anticipated barriers to physical therapy: none identified    Goals:   Short Term Goals: 6 weeks   1. Independent with HEP - MET   2. Increase pain-free flexion PROM to 90 degrees - MET   3. Increase pain-free ER PROM to 20 degrees - MET   4. Pt reports R shoulder pain is </= 7/10 - MET      Long Term Goals: 10+ weeks   1. Increase pain-free R shoulder ER ROM to >/=30 degrees   2. Increase pain-free R shoulder flexion ROM to >/=130 degrees   3. Improve deltoid strength to >/=3+/5  4. Pt reports R shoulder pain is </= 2/10       Plan     Continue with PT  POC    Yane Longoria, PTA

## 2023-01-19 ENCOUNTER — HOSPITAL ENCOUNTER (OUTPATIENT)
Dept: RADIOLOGY | Facility: HOSPITAL | Age: 78
Discharge: HOME | End: 2023-01-19
Attending: ORTHOPAEDIC SURGERY
Payer: MEDICARE

## 2023-01-19 ENCOUNTER — OFFICE VISIT (OUTPATIENT)
Dept: SPORTS MEDICINE | Facility: CLINIC | Age: 78
End: 2023-01-19
Payer: MEDICARE

## 2023-01-19 VITALS
WEIGHT: 153 LBS | HEART RATE: 102 BPM | BODY MASS INDEX: 24.59 KG/M2 | SYSTOLIC BLOOD PRESSURE: 136 MMHG | HEIGHT: 66 IN | DIASTOLIC BLOOD PRESSURE: 81 MMHG

## 2023-01-19 DIAGNOSIS — G89.29 CHRONIC RIGHT SHOULDER PAIN: ICD-10-CM

## 2023-01-19 DIAGNOSIS — G89.29 CHRONIC RIGHT SHOULDER PAIN: Primary | ICD-10-CM

## 2023-01-19 DIAGNOSIS — M25.511 CHRONIC RIGHT SHOULDER PAIN: ICD-10-CM

## 2023-01-19 DIAGNOSIS — M25.511 CHRONIC RIGHT SHOULDER PAIN: Primary | ICD-10-CM

## 2023-01-19 PROCEDURE — 1125F AMNT PAIN NOTED PAIN PRSNT: CPT | Mod: CPTII,S$GLB,, | Performed by: ORTHOPAEDIC SURGERY

## 2023-01-19 PROCEDURE — 3075F PR MOST RECENT SYSTOLIC BLOOD PRESS GE 130-139MM HG: ICD-10-PCS | Mod: CPTII,S$GLB,, | Performed by: ORTHOPAEDIC SURGERY

## 2023-01-19 PROCEDURE — 3075F SYST BP GE 130 - 139MM HG: CPT | Mod: CPTII,S$GLB,, | Performed by: ORTHOPAEDIC SURGERY

## 2023-01-19 PROCEDURE — 99024 POSTOP FOLLOW-UP VISIT: CPT | Mod: S$GLB,,, | Performed by: ORTHOPAEDIC SURGERY

## 2023-01-19 PROCEDURE — 3079F DIAST BP 80-89 MM HG: CPT | Mod: CPTII,S$GLB,, | Performed by: ORTHOPAEDIC SURGERY

## 2023-01-19 PROCEDURE — 99999 PR PBB SHADOW E&M-EST. PATIENT-LVL III: ICD-10-PCS | Mod: PBBFAC,,, | Performed by: ORTHOPAEDIC SURGERY

## 2023-01-19 PROCEDURE — 99999 PR PBB SHADOW E&M-EST. PATIENT-LVL III: CPT | Mod: PBBFAC,,, | Performed by: ORTHOPAEDIC SURGERY

## 2023-01-19 PROCEDURE — 1125F PR PAIN SEVERITY QUANTIFIED, PAIN PRESENT: ICD-10-PCS | Mod: CPTII,S$GLB,, | Performed by: ORTHOPAEDIC SURGERY

## 2023-01-19 PROCEDURE — 99024 PR POST-OP FOLLOW-UP VISIT: ICD-10-PCS | Mod: S$GLB,,, | Performed by: ORTHOPAEDIC SURGERY

## 2023-01-19 PROCEDURE — 73030 X-RAY EXAM OF SHOULDER: CPT | Mod: TC,RT

## 2023-01-19 PROCEDURE — 73030 XR SHOULDER COMPLETE 2 OR MORE VIEWS RIGHT: ICD-10-PCS | Mod: 26,RT,, | Performed by: RADIOLOGY

## 2023-01-19 PROCEDURE — 73030 X-RAY EXAM OF SHOULDER: CPT | Mod: 26,RT,, | Performed by: RADIOLOGY

## 2023-01-19 PROCEDURE — 3079F PR MOST RECENT DIASTOLIC BLOOD PRESSURE 80-89 MM HG: ICD-10-PCS | Mod: CPTII,S$GLB,, | Performed by: ORTHOPAEDIC SURGERY

## 2023-01-19 RX ORDER — CELECOXIB 200 MG/1
200 CAPSULE ORAL DAILY
Qty: 30 CAPSULE | Refills: 0 | Status: SHIPPED | OUTPATIENT
Start: 2023-01-19

## 2023-01-19 NOTE — PROGRESS NOTES
S:Elena Willingham presents for post-operative evaluation.     DATE OF PROCEDURE: 12/7/2022   PROCEDURES PERFORMED:   Right reverse shoulder arthroplasty(CPT 38647)  Right shoulder open biceps tenodesis (CPT 97936)    Elena Willingham reports to be doing well 6wk s/p the above mentioned procedure. Going to PT 2xWeek at the [facility] location. Seeing good progress daily. Pain levels are improving. Takes pain medication PRN.     O: RUE: The incision is well healed.  No signs of infection.  No significant pain or unusual tenderness. Active FE to 100, Passive FE to 160. Active ER to 0, Passive FE to 50. She can place hand on top of her head and on back of her head. No sig pain reported. Reports much better compared to preop. SILT over the axillary distribution.    Radiographs today:  Radiographs of the right shoulder show the reverse prosthesis to be in good position.  No signs of loosening or complication otherwise.    A/P:   Exam of the right shoulder shows fairly good function.  No significant pain.  Making progress in PT.  May discontinue sling and pillow.  Cautioned against doing too much too soon.  No lifting more than  5-10 lb for now.  I will see her back in 6 weeks.

## 2023-01-20 ENCOUNTER — CLINICAL SUPPORT (OUTPATIENT)
Dept: REHABILITATION | Facility: HOSPITAL | Age: 78
End: 2023-01-20
Payer: MEDICARE

## 2023-01-20 DIAGNOSIS — M25.611 DECREASED RANGE OF MOTION OF RIGHT SHOULDER: Primary | ICD-10-CM

## 2023-01-20 PROCEDURE — 97110 THERAPEUTIC EXERCISES: CPT | Mod: CQ

## 2023-01-20 PROCEDURE — 97140 MANUAL THERAPY 1/> REGIONS: CPT | Mod: CQ

## 2023-01-20 NOTE — PROGRESS NOTES
"  Physical Therapy Treatment Note  [address]     Name: Elena OSHEA [facility] Number: [id number]    Therapy Diagnosis:   Encounter Diagnosis   Name Primary?    Decreased range of motion of right shoulder Yes     Physician: Richard Hoffman, *    Visit Date: 1/20/2023    Physician Orders: PT Eval and Treat   Medical Diagnosis from Referral: Primary osteoarthritis of right shoulder   Right shoulder pain, unspecified chronicity   Bicipital tendinitis of right shoulder   Evaluation Date: 12/15/2022  Authorization Period Expiration: 01/01/2023 12/31/2023   Plan of Care Expiration: 3/15/23  Visit # / Visits authorized: 6/20     FOTO IE 12/15/22: 41%  FOTO 1/20/23: 42%  FOTO 2nd follow up:      Time In: 0809  Time Out: 0908  Total Billable Time: 53 minutes     Precautions: Standard     Procedure: 12/7/22 Chintan  Right reverse shoulder arthroplasty  Right shoulder open biceps tenodesis    Subjective     Pt reports: she got a good report from MD. Sling was d/c.     She was compliant with home exercise program.  Response to previous treatment: tolerated well  Functional change: stiffness    Pain: 1/10  Location: L shoulder    Objective     DOS: 12/7/22  POD: 6 weeks, 2 days    Elena received therapeutic exercises to develop strength, endurance, ROM, flexibility, posture and core stabilization for 46 minutes including:     Table slide flexion / scaption x 4' each  Pulleys (scaption) x 4'  Flexion walkaway stretch 10 x 10"  Supine AA dowel flexion to tolerance 4 x 8  Supine ER dowel stretch 10 x 10"  Supine AROM chest press 3 x 8  Rows GTB 3 x 10 x 5"  Ext OTB 3 x 10 x 3"    NP:  Landmines to 70deg 2 x 8  Supine AA dowel chest press 3 x 10  Ball flexion walkouts 30x    Elena received the following manual therapy techniques: Joint mobilizations, Manual traction, Myofacial release, Soft tissue Mobilization, Friction Massage and Functional Dry Needling were applied for 08 minutes, including:    Oscillations for " pain/guarding (gr I)  PROM R shoulder ER/flexion per protocol      Home Exercises Provided and Patient Education Provided     Education provided:   - continue HEP     Written Home Exercises Provided: Patient instructed to cont prior HEP.  Exercises were reviewed and Elena was able to demonstrate them prior to the end of the session.  Elena demonstrated good  understanding of the education provided.     See EMR under Patient Instructions for exercises provided from Initial Evaluation.    Assessment     Elena did great today. Flexion AAROM ~135deg, ER AAROM in POS ~50deg. Min cueing to avoid compensatory shrug during rows/ext. Encouraged continued compliance with HEP and activity modification; pt voiced understanding. No adverse effects reported p tx.     Elena is progressing towards her goals.   Pt prognosis is good    Pt will continue to benefit from skilled outpatient physical therapy to address the deficits listed in the problem list box on initial evaluation, provide pt/family education and to maximize pt's level of independence in the home and community environment.     Pt's spiritual, cultural and educational needs considered and pt agreeable to plan of care and goals.     Anticipated barriers to physical therapy: none identified    Goals:   Short Term Goals: 6 weeks   1. Independent with HEP - MET   2. Increase pain-free flexion PROM to 90 degrees - MET   3. Increase pain-free ER PROM to 20 degrees - MET   4. Pt reports R shoulder pain is </= 7/10 - MET      Long Term Goals: 10+ weeks   1. Increase pain-free R shoulder ER ROM to >/=30 degrees   2. Increase pain-free R shoulder flexion ROM to >/=130 degrees   3. Improve deltoid strength to >/=3+/5  4. Pt reports R shoulder pain is </= 2/10       Plan     Continue with PT AUSTYN Longoria, LESLIE

## 2023-01-24 ENCOUNTER — CLINICAL SUPPORT (OUTPATIENT)
Dept: REHABILITATION | Facility: HOSPITAL | Age: 78
End: 2023-01-24
Payer: MEDICARE

## 2023-01-24 DIAGNOSIS — M25.611 DECREASED RANGE OF MOTION OF RIGHT SHOULDER: Primary | ICD-10-CM

## 2023-01-24 PROCEDURE — 97110 THERAPEUTIC EXERCISES: CPT | Mod: CQ

## 2023-01-24 PROCEDURE — 97140 MANUAL THERAPY 1/> REGIONS: CPT | Mod: CQ

## 2023-01-24 NOTE — PROGRESS NOTES
"  Physical Therapy Treatment Note  [address]     [facility]  [facility] [id number]    Therapy Diagnosis:   Encounter Diagnosis   Name Primary?    Decreased range of motion of right shoulder Yes     Physician: Richard Hoffman, *    Visit Date: 1/24/2023    Physician Orders: PT Eval and Treat   Medical Diagnosis from Referral: Primary osteoarthritis of right shoulder   Right shoulder pain, unspecified chronicity   Bicipital tendinitis of right shoulder   Evaluation Date: 12/15/2022  Authorization Period Expiration: 01/01/2023 12/31/2023   Plan of Care Expiration: 3/15/23  Visit # / Visits authorized: 7/20     FOTO IE 12/15/22: 41%  FOTO 1/20/23: 42%  FOTO 2nd follow up:      Time In: 1113  Time Out: 1203  Total Billable Time: 30 minutes     Precautions: Standard     Procedure: 12/7/22 Chintan  Right reverse shoulder arthroplasty  Right shoulder open biceps tenodesis    Subjective     Pt reports: no new complaints.     She was compliant with home exercise program.  Response to previous treatment: tolerated well  Functional change: stiffness    Pain: 1/10  Location: L shoulder    Objective     DOS: 12/7/22  POD: 6 weeks, 6 days as of 1/24 1/20: Flexion AAROM ~135deg, ER AAROM in POS ~50deg    Elena received therapeutic exercises to develop strength, endurance, ROM, flexibility, posture and core stabilization for 42 minutes including:     Table slide flexion / scaption x 4' each  Pulleys (scaption) x 4'  Flexion walkaway stretch 10 x 10"  Supine AA dowel flexion to tolerance 4 x 8  Supine ER dowel stretch 10 x 10"  Supine AROM chest press 3 x 8  Supine AROM long lever flexion to 90deg 2 x 8  Rows GTB 3 x 10 x 5"  Ext OTB 3 x 10 x 3"    NP:  Landmines to 70deg 2 x 8  Supine AA dowel chest press 3 x 10  Ball flexion walkouts 30x    Elena received the following manual therapy techniques: Joint mobilizations, Manual traction, Myofacial release, Soft tissue Mobilization, Friction Massage " and Functional Dry Needling were applied for 04 minutes, including:    Oscillations for pain/guarding (gr I)  PROM R shoulder ER/flexion per protocol      Home Exercises Provided and Patient Education Provided     Education provided:   - continue HEP     Written Home Exercises Provided: Patient instructed to cont prior HEP.  Exercises were reviewed and Elena was able to demonstrate them prior to the end of the session.  Elena demonstrated good  understanding of the education provided.     See EMR under Patient Instructions for exercises provided from Initial Evaluation.    Assessment     Elena did great today. Good tolerance to supine AROM flexion therex; added to HEP. Encouraged continued compliance with HEP and activity modification; pt voiced understanding. No adverse effects reported p tx.     Elena is progressing towards her goals.   Pt prognosis is good    Pt will continue to benefit from skilled outpatient physical therapy to address the deficits listed in the problem list box on initial evaluation, provide pt/family education and to maximize pt's level of independence in the home and community environment.     Pt's spiritual, cultural and educational needs considered and pt agreeable to plan of care and goals.     Anticipated barriers to physical therapy: none identified    Goals:   Short Term Goals: 6 weeks   1. Independent with HEP - MET   2. Increase pain-free flexion PROM to 90 degrees - MET   3. Increase pain-free ER PROM to 20 degrees - MET   4. Pt reports R shoulder pain is </= 7/10 - MET      Long Term Goals: 10+ weeks   1. Increase pain-free R shoulder ER ROM to >/=30 degrees   2. Increase pain-free R shoulder flexion ROM to >/=130 degrees   3. Improve deltoid strength to >/=3+/5  4. Pt reports R shoulder pain is </= 2/10       Plan     Continue with PT AUSTYN Longoria PTA

## 2023-01-31 ENCOUNTER — CLINICAL SUPPORT (OUTPATIENT)
Dept: REHABILITATION | Facility: HOSPITAL | Age: 78
End: 2023-01-31
Payer: MEDICARE

## 2023-01-31 DIAGNOSIS — M25.611 DECREASED RANGE OF MOTION OF RIGHT SHOULDER: Primary | ICD-10-CM

## 2023-01-31 PROCEDURE — 97110 THERAPEUTIC EXERCISES: CPT | Mod: CQ

## 2023-01-31 NOTE — PROGRESS NOTES
"  Physical Therapy Treatment Note  [place] [address]     Name: Elena Willingham  Clinic Number: [id number]    Therapy Diagnosis:   Encounter Diagnosis   Name Primary?    Decreased range of motion of right shoulder Yes     Physician: Richard Hoffman, *    Visit Date: 1/31/2023    Physician Orders: PT Eval and Treat   Medical Diagnosis from Referral: Primary osteoarthritis of right shoulder   Right shoulder pain, unspecified chronicity   Bicipital tendinitis of right shoulder   Evaluation Date: 12/15/2022  Authorization Period Expiration: 01/01/2023 12/31/2023   Plan of Care Expiration: 3/15/23  Visit # / Visits authorized: 8/20     FOTO IE 12/15/22: 41%  FOTO 1/20/23: 42%  FOTO 2nd follow up:      Time In: 1106  Time Out: 1203  Total Billable Time: 53 minutes (1 on 1)     Precautions: Standard     Procedure: 12/7/22 Chintan  Right reverse shoulder arthroplasty  Right shoulder open biceps tenodesis    Subjective     Pt reports: min L shldr soreness upon entry. She missed her last appt bc she had a sinus infection.     She was compliant with home exercise program.  Response to previous treatment: tolerated well  Functional change: stiffness    Pain: 1/10  Location: L shoulder    Objective     DOS: 12/7/22  POD: 7 weeks, 6 days as of 1/31 1/20: Flexion AAROM ~135deg, ER AAROM in POS ~50deg  1/31: ER AAROM 57deg    Elena received therapeutic exercises to develop strength, endurance, ROM, flexibility, posture and core stabilization for 53 minutes including:     UBE lvl 3 x 4'/4' for UE strengthening/cardiovascular endurance  Flexion walkaway stretch 10 x 10"  Pulleys (scaption) x 4'  Supine AA dowel flexion to tolerance 3 x 10  Supine ER dowel stretch 12 x 10"  Supine AROM chest press 2 x 8  Supine AROM long lever flexion to 90deg 2 x 8  Rows GTB 3 x 12 x 5"  Ext GTB 3 x 12 x 3"  Landmines (max height) 4 x 6  Pt education: HEP review, issued printout    NP:  Table slide flexion / scaption x 4' each  Supine AA " dowel chest press 3 x 10  Ball flexion walkouts 30x    Elena received the following manual therapy techniques: Joint mobilizations, Manual traction, Myofacial release, Soft tissue Mobilization, Friction Massage and Functional Dry Needling were applied for 04 minutes, including:    Oscillations for pain/guarding (gr I)  PROM R shoulder ER/flexion per protocol      Home Exercises Provided and Patient Education Provided     Education provided:   - continue HEP     Written Home Exercises Provided: Patient instructed to cont prior HEP.  Exercises were reviewed and Elena was able to demonstrate them prior to the end of the session.  Elena demonstrated good  understanding of the education provided.     See EMR under Patient Instructions for exercises provided from Initial Evaluation.    Assessment     Elena did great today. Better performance during landmines Issued therabands/printout for rows/ext and added to HEP. Encouraged continued compliance with supine AA/AROM therex. No adverse effects reported p tx.     Elena is progressing towards her goals.   Pt prognosis is good    Pt will continue to benefit from skilled outpatient physical therapy to address the deficits listed in the problem list box on initial evaluation, provide pt/family education and to maximize pt's level of independence in the home and community environment.     Pt's spiritual, cultural and educational needs considered and pt agreeable to plan of care and goals.     Anticipated barriers to physical therapy: none identified    Goals:   Short Term Goals: 6 weeks   1. Independent with HEP - MET   2. Increase pain-free flexion PROM to 90 degrees - MET   3. Increase pain-free ER PROM to 20 degrees - MET   4. Pt reports R shoulder pain is </= 7/10 - MET      Long Term Goals: 10+ weeks   1. Increase pain-free R shoulder ER ROM to >/=30 degrees   2. Increase pain-free R shoulder flexion ROM to >/=130 degrees   3. Improve deltoid strength to  >/=3+/5  4. Pt reports R shoulder pain is </= 2/10       Plan     Continue with PT POC    Yane Longoria PTA

## 2023-02-02 ENCOUNTER — CLINICAL SUPPORT (OUTPATIENT)
Dept: REHABILITATION | Facility: HOSPITAL | Age: 78
End: 2023-02-02
Payer: MEDICARE

## 2023-02-02 DIAGNOSIS — M25.611 DECREASED RANGE OF MOTION OF RIGHT SHOULDER: Primary | ICD-10-CM

## 2023-02-02 PROCEDURE — 97140 MANUAL THERAPY 1/> REGIONS: CPT

## 2023-02-02 PROCEDURE — 97110 THERAPEUTIC EXERCISES: CPT

## 2023-02-02 NOTE — PROGRESS NOTES
"  PROGRESS NOTE  &  Physical Therapy Treatment Note  [place] [address]     Name: Elena Willingham  Clinic Number: [id number]    Therapy Diagnosis:   Encounter Diagnosis   Name Primary?    Decreased range of motion of right shoulder Yes     Physician: Richard Hoffman, *    Visit Date: 2/2/2023    Physician Orders: PT Eval and Treat   Medical Diagnosis from Referral: Primary osteoarthritis of right shoulder   Right shoulder pain, unspecified chronicity   Bicipital tendinitis of right shoulder   Evaluation Date: 12/15/2022  Authorization Period Expiration: 12/29/23  Plan of Care Expiration: 5/2/23  Visit # / Visits authorized: 9 /20     FOTO IE 12/15/22: 41%  FOTO 1/20/23: 42%  FOTO 2nd follow up:      Time In: 1108  Time Out: 1203  Total Billable Time: 30 minutes (1 on 1)     Precautions: Standard     Procedure: 12/7/22 Chintan  Right reverse shoulder arthroplasty  Right shoulder open biceps tenodesis    Subjective     Pt reports: L shoulder is sore but she thinks it is from weird night of sleep (her great grand daughter slept over in her bed).    She was compliant with home exercise program.  Response to previous treatment: tolerated well  Functional change: stiffness    Pain: 1/10  Location: L shoulder    Objective     DOS: 12/7/22  POD: 8 weeks, 1 days as of 2/2    Range of Motion:   Shoulder Right   Flexion 135   Abduction 110   ER at 45 Abd 30   ER at 90 Abd  30   ER at 0 Abd 60        Elena received therapeutic exercises to develop strength, endurance, ROM, flexibility, posture and core stabilization for 43 minutes including:     UBE lvl 3 x 4'/4' for UE strengthening/cardiovascular endurance  Flexion / abduction walkaway stretch 20 x 5"  Pulleys (flexion/scaption) x 2'  Sidelying:   Horizontal abduction 2 x 10   Abduction 2 x 10   Modified deltoid 2 x 10   Flexion 2 x 10  Pt education: HEP review, issued printout      Not Today:  Table slide flexion / scaption x 4' each  Supine AA dowel chest press 3 x " "10  Ball flexion walkouts 30x  Supine AA dowel flexion to tolerance 3 x 10  Supine ER dowel stretch 12 x 10"  Supine AROM chest press 2 x 8  Supine AROM long lever flexion to 90deg 2 x 8  Rows GTB 3 x 12 x 5"  Ext GTB 3 x 12 x 3"  Landmines (max height) 4 x 6    Elena received the following manual therapy techniques: Joint mobilizations, Manual traction, Myofacial release, Soft tissue Mobilization, Friction Massage and Functional Dry Needling were applied for 10 minutes, including:    PROM R shoulder      Home Exercises Provided and Patient Education Provided     Education provided:   - continue HEP     Written Home Exercises Provided: Patient instructed to cont prior HEP.  Exercises were reviewed and Elena was able to demonstrate them prior to the end of the session.  Elena demonstrated good  understanding of the education provided.     See EMR under Patient Instructions for exercises provided from Initial Evaluation.    Assessment     Patient presented a little stiff today. We progressed strengthening, focus on deltoid and continue gaining functional ROM.        Elena is progressing towards her goals.   Pt prognosis is good    Pt will continue to benefit from skilled outpatient physical therapy to address the deficits listed in the problem list box on initial evaluation, provide pt/family education and to maximize pt's level of independence in the home and community environment.     Pt's spiritual, cultural and educational needs considered and pt agreeable to plan of care and goals.     Anticipated barriers to physical therapy: none identified    Goals:   Short Term Goals: 6 weeks - MET   1. Independent with HEP - MET   2. Increase pain-free flexion PROM to 90 degrees - MET   3. Increase pain-free ER PROM to 20 degrees - MET   4. Pt reports R shoulder pain is </= 7/10 - MET      Long Term Goals: 10+ weeks   1. Increase pain-free R shoulder ER ROM to >/=30 degrees - MET   2. Increase pain-free R shoulder " flexion AROM to >/=130 degrees - not met, progressing   3. Improve deltoid strength to >/=3+/5  4. Pt reports R shoulder pain is </= 2/10       Plan     Continue with PT AUSTYN Marie, PT

## 2023-02-07 ENCOUNTER — CLINICAL SUPPORT (OUTPATIENT)
Dept: REHABILITATION | Facility: HOSPITAL | Age: 78
End: 2023-02-07
Payer: MEDICARE

## 2023-02-07 DIAGNOSIS — M25.611 DECREASED RANGE OF MOTION OF RIGHT SHOULDER: Primary | ICD-10-CM

## 2023-02-07 PROCEDURE — 97110 THERAPEUTIC EXERCISES: CPT | Mod: CQ

## 2023-02-07 PROCEDURE — 97140 MANUAL THERAPY 1/> REGIONS: CPT | Mod: CQ

## 2023-02-07 NOTE — PROGRESS NOTES
"  Physical Therapy Treatment Note  [place] [address]     [facility]  [facility] Number: [id number]    Therapy Diagnosis:   Encounter Diagnosis   Name Primary?    Decreased range of motion of right shoulder Yes     Physician: Richard Hoffman, *    Visit Date: 2/7/2023    Physician Orders: PT Eval and Treat   Medical Diagnosis from Referral: Primary osteoarthritis of right shoulder   Right shoulder pain, unspecified chronicity   Bicipital tendinitis of right shoulder   Evaluation Date: 12/15/2022  Authorization Period Expiration: 12/29/23  Plan of Care Expiration: 5/2/23  Visit # / Visits authorized: 10/20     FOTO IE 12/15/22: 41%  FOTO 1/20/23: 42%  FOTO:      Time In: 1058  Time Out: 1200  Total Billable Time: 54 minutes (1 on 1)     Precautions: Standard     Procedure: 12/7/22 Chintan  Right reverse shoulder arthroplasty  Right shoulder open biceps tenodesis    Subjective     Pt reports: min soreness/stiffness in L arm/forearm. Reports compliance with HEP.     She was compliant with home exercise program.  Response to previous treatment: tolerated well  Functional change: stiffness    Pain: 1/10  Location: L shoulder    Objective     DOS: 12/7/22  POD: 8 weeks, 6 days as of 2/7    Range of Motion:   Shoulder Right   Flexion 135   Abduction 110   ER at 45 Abd 30   ER at 90 Abd  30   ER at 0 Abd 60        Elena received therapeutic exercises to develop strength, endurance, ROM, flexibility, posture and core stabilization for 46 minutes including:     UBE lvl 3 x 4'/4' for UE strengthening/cardiovascular endurance  Flexion walkaway stretch 10 x 10"  Supine ER dowel stretch 10 x 10"  Supine AA dowel flexion to tolerance 3 x 10  Sidelying:   Abduction 3 x 10   Flexion 3 x 10   D2 flx 2 x 10  Ext GTB 3 x 12 x 3"  Supine hor ABD RTB 3 x 15  Supine alligators RTB demo  Pt education: HEP review    Not Today:  Pulleys (flexion/scaption) x 2'  Ball flexion walkouts 30x  Supine AROM chest press 2 x 8  Supine " "AROM long lever flexion to 90deg 2 x 8  Rows GTB 3 x 12 x 5"  Landmines (max height) 4 x 6    Elena received the following manual therapy techniques: Joint mobilizations, Manual traction, Myofacial release, Soft tissue Mobilization, Friction Massage and Functional Dry Needling were applied for 08 minutes, including:    Assessment of shldr ROM  PROM R shoulder flx, ER to tolerance      Home Exercises Provided and Patient Education Provided     Education provided:   - continue HEP     Written Home Exercises Provided: Patient instructed to cont prior HEP.  Exercises were reviewed and Elena was able to demonstrate them prior to the end of the session.  Elena demonstrated good  understanding of the education provided.     See EMR under Patient Instructions for exercises provided from Initial Evaluation.    Assessment     Elena is progressing well. End range flexion ROM remains limited but is slowly improving with time. Good tolerance to S/L therex with min discomfort reported during D2. She was educated on supine hor ABD and alligators to perform at home and to d/c S/L D2 flx if painful; pt voiced understanding. No adverse effects reported p tx,     Elena is progressing towards her goals.   Pt prognosis is good    Pt will continue to benefit from skilled outpatient physical therapy to address the deficits listed in the problem list box on initial evaluation, provide pt/family education and to maximize pt's level of independence in the home and community environment.     Pt's spiritual, cultural and educational needs considered and pt agreeable to plan of care and goals.     Anticipated barriers to physical therapy: none identified    Goals:   Short Term Goals: 6 weeks - MET   1. Independent with HEP - MET   2. Increase pain-free flexion PROM to 90 degrees - MET   3. Increase pain-free ER PROM to 20 degrees - MET   4. Pt reports R shoulder pain is </= 7/10 - MET      Long Term Goals: 10+ weeks   1. Increase " pain-free R shoulder ER ROM to >/=30 degrees - MET   2. Increase pain-free R shoulder flexion AROM to >/=130 degrees - not met, progressing   3. Improve deltoid strength to >/=3+/5  4. Pt reports R shoulder pain is </= 2/10       Plan     Continue with PT AUSTYN Longoria, LESLIE

## 2023-02-09 ENCOUNTER — CLINICAL SUPPORT (OUTPATIENT)
Dept: REHABILITATION | Facility: HOSPITAL | Age: 78
End: 2023-02-09
Payer: MEDICARE

## 2023-02-09 DIAGNOSIS — M25.611 DECREASED RANGE OF MOTION OF RIGHT SHOULDER: Primary | ICD-10-CM

## 2023-02-09 PROCEDURE — 97140 MANUAL THERAPY 1/> REGIONS: CPT

## 2023-02-09 PROCEDURE — 97110 THERAPEUTIC EXERCISES: CPT

## 2023-02-09 NOTE — PROGRESS NOTES
"  Physical Therapy Treatment Note  [address]     [facility]  [facility] Number: [id number]    Therapy Diagnosis:   Encounter Diagnosis   Name Primary?    Decreased range of motion of right shoulder Yes     Physician: Richard Hoffman, *    Visit Date: 2/9/2023    Physician Orders: PT Eval and Treat   Medical Diagnosis from Referral: Primary osteoarthritis of right shoulder   Right shoulder pain, unspecified chronicity   Bicipital tendinitis of right shoulder   Evaluation Date: 12/15/2022  Authorization Period Expiration: 12/29/23  Plan of Care Expiration: 5/2/23  Visit # / Visits authorized: 10 / 20     FOTO IE 12/15/22: 41%  FOTO 1/20/23: 42%  FOTO:      Time In: 1105  Time Out: 1200  Total Billable Time: 55 minutes (1 on 1)     Precautions: Standard     Procedure: 12/7/22 Chintan  Right reverse shoulder arthroplasty  Right shoulder open biceps tenodesis    Subjective     Pt reports: shoulder feels stiff     She was partially compliant with home exercise program.  Response to previous treatment: tolerated well  Functional change: stiffness    Pain: 1/10  Location: L shoulder    Objective     DOS: 12/7/22  POD: 9 weeks, 1 days as of 2/9    Range of Motion:   Shoulder Right   Flexion 135   Abduction 110   ER at 45 Abd 30   ER at 90 Abd  30   ER at 0 Abd 60        Elena received therapeutic exercises to develop strength, endurance, ROM, flexibility, posture and core stabilization for 45 minutes including:   Flexion/abd walkaway stretch 20 x 5"  Seated Lower trap pull downs with cables 3# 3 x 15 ~ cueing for lower trap activation  Landmine 2# 2 x 12  Supine flexion 2# 20x  Supine ER 90/90 dowel stretch 20 x 5"  Sidelying modified deltoid 2# 2 x 10      Elena received the following manual therapy techniques: Joint mobilizations, Manual traction, Myofacial release, Soft tissue Mobilization, Friction Massage and Functional Dry Needling were applied for 10 minutes, including:  PROM stretching in all " "planes, including IR    Not Today:  Abduction 3 x 10   Flexion 3 x 10   D2 flx 2 x 10  UBE lvl 3 x 4'/4' for UE strengthening/cardiovascular endurance  Supine hor ABD RTB 3 x 15  Supine alligators RTB demo  Ball flexion walkouts 30x  Supine AROM chest press 2 x 8  Supine AROM long lever flexion to 90deg 2 x 8  Rows GTB 3 x 12 x 5"  Landmines (max height) 4 x 6      Home Exercises Provided and Patient Education Provided     Education provided:   - continue HEP     Written Home Exercises Provided: Patient instructed to cont prior HEP.  Exercises were reviewed and Elena was able to demonstrate them prior to the end of the session.  Elena demonstrated good  understanding of the education provided.     See EMR under Patient Instructions for exercises provided from Initial Evaluation.    Assessment     Patient ROM improved after assisted cable pull downs.     Elena is progressing towards her goals.   Pt prognosis is good    Pt will continue to benefit from skilled outpatient physical therapy to address the deficits listed in the problem list box on initial evaluation, provide pt/family education and to maximize pt's level of independence in the home and community environment.     Pt's spiritual, cultural and educational needs considered and pt agreeable to plan of care and goals.     Anticipated barriers to physical therapy: none identified    Goals:   Short Term Goals: 6 weeks - MET   1. Independent with HEP - MET   2. Increase pain-free flexion PROM to 90 degrees - MET   3. Increase pain-free ER PROM to 20 degrees - MET   4. Pt reports R shoulder pain is </= 7/10 - MET      Long Term Goals: 10+ weeks   1. Increase pain-free R shoulder ER ROM to >/=30 degrees - MET   2. Increase pain-free R shoulder flexion AROM to >/=130 degrees - not met, progressing   3. Improve deltoid strength to >/=3+/5  4. Pt reports R shoulder pain is </= 2/10       Plan     Continue with PT POC    Ashli Marie, PT          "

## 2023-02-14 ENCOUNTER — CLINICAL SUPPORT (OUTPATIENT)
Dept: REHABILITATION | Facility: HOSPITAL | Age: 78
End: 2023-02-14
Payer: MEDICARE

## 2023-02-14 DIAGNOSIS — M25.611 DECREASED RANGE OF MOTION OF RIGHT SHOULDER: Primary | ICD-10-CM

## 2023-02-14 PROCEDURE — 97110 THERAPEUTIC EXERCISES: CPT | Mod: CQ

## 2023-02-14 PROCEDURE — 97112 NEUROMUSCULAR REEDUCATION: CPT | Mod: CQ

## 2023-02-23 ENCOUNTER — CLINICAL SUPPORT (OUTPATIENT)
Dept: REHABILITATION | Facility: HOSPITAL | Age: 78
End: 2023-02-23
Payer: MEDICARE

## 2023-02-23 DIAGNOSIS — M25.611 DECREASED RANGE OF MOTION OF RIGHT SHOULDER: Primary | ICD-10-CM

## 2023-02-23 PROCEDURE — 97112 NEUROMUSCULAR REEDUCATION: CPT | Mod: CQ

## 2023-02-23 PROCEDURE — 97110 THERAPEUTIC EXERCISES: CPT | Mod: CQ

## 2023-02-23 NOTE — PROGRESS NOTES
"  Physical Therapy Treatment Note  [address]     [facility]  [facility] Number[facility] [id number]    Therapy Diagnosis:   Encounter Diagnosis   Name Primary?    Decreased range of motion of right shoulder Yes     Physician: Richard Hoffman, *    Visit Date: 2/23/2023    Physician Orders: PT Eval and Treat   Medical Diagnosis from Referral: Primary osteoarthritis of right shoulder   Right shoulder pain, unspecified chronicity   Bicipital tendinitis of right shoulder   Evaluation Date: 12/15/2022  Authorization Period Expiration: 12/29/23  Plan of Care Expiration: 5/2/23  Visit # / Visits authorized: 13/20     FOTO IE 12/15/22: 41%  FOTO 1/20/23: 42%  FOTO:      Time In: 1114  Time Out: 1202  Total Billable Time: 25 minutes (1 on 1)     Precautions: Standard     Procedure: 12/7/22 Chintan  Right reverse shoulder arthroplasty  Right shoulder open biceps tenodesis    Subjective     Pt reports: her shoulder is a little sore p [address] where she held her grandbaby.     She was partially compliant with home exercise program.  Response to previous treatment: tolerated well  Functional change: stiffness    Pain: 1/10  Location: L shoulder    Objective     DOS: 12/7/22  POD: 9 weeks, 5 days as of 2/14    Range of Motion:   Shoulder Right   Flexion 135   Abduction 110   ER at 45 Abd 30   ER at 90 Abd  30   ER at 0 Abd 60        Elena received therapeutic exercises to develop strength, endurance, ROM, flexibility, posture and core stabilization for 25 minutes including:     UBE fwd only lvl 3 x 6'   Supine AA blue dowel flexion 3 x 12 x 3"  Flexion walkaway stretch 10 x 10"   Supine ER dowel stretch 10 x 10"    NP:   Supine flexion 1# 3 x 10  S/L ABD 1# 3 x 8  S/L flx 1# 3 x 8  Ext/ER GTB 3 x 15  Sidelying modified deltoid 2# 2 x 10    Elena received the following manual therapy techniques: Joint mobilizations, Manual traction, Myofacial release, Soft tissue Mobilization, Friction Massage and Functional Dry " Needling were applied for 05 minutes, including:    Assessment of shldr ROM  PROM R shoulder flx, ER to tolerance    Elena participated in neuromuscular re-education activities to improve: Posture and Neuromuscular Control for 15 minutes. The following activities were included:     4-way RS wall ball (unweighted yellow ball) x 20 each   Seated LT pull downs with cables 3# 4 x 15 - np  Landmine 3# 3 x 10   Standing forward punch YTB 3 x 10 - np      Home Exercises Provided and Patient Education Provided     Education provided:   - continue HEP     Written Home Exercises Provided: Patient instructed to cont prior HEP.  Exercises were reviewed and Elena was able to demonstrate them prior to the end of the session.  Elena demonstrated good  understanding of the education provided.     See EMR under Patient Instructions for exercises provided from Initial Evaluation.    Assessment     Today's tx was somewhat limited d/t late arrival. She was challenged by initiation of RS wall ball with rest breaks required. Encouraged continued compliance with HEP. No adverse effects reported p tx.     Elena is progressing towards her goals.   Pt prognosis is good    Pt will continue to benefit from skilled outpatient physical therapy to address the deficits listed in the problem list box on initial evaluation, provide pt/family education and to maximize pt's level of independence in the home and community environment.     Pt's spiritual, cultural and educational needs considered and pt agreeable to plan of care and goals.     Anticipated barriers to physical therapy: none identified    Goals:   Short Term Goals: 6 weeks - MET   1. Independent with HEP - MET   2. Increase pain-free flexion PROM to 90 degrees - MET   3. Increase pain-free ER PROM to 20 degrees - MET   4. Pt reports R shoulder pain is </= 7/10 - MET      Long Term Goals: 10+ weeks   1. Increase pain-free R shoulder ER ROM to >/=30 degrees - MET   2. Increase  pain-free R shoulder flexion AROM to >/=130 degrees - not met, progressing   3. Improve deltoid strength to >/=3+/5  4. Pt reports R shoulder pain is </= 2/10       Plan     Continue with PT POC    Yane Longoria PTA

## 2023-02-28 ENCOUNTER — CLINICAL SUPPORT (OUTPATIENT)
Dept: REHABILITATION | Facility: HOSPITAL | Age: 78
End: 2023-02-28
Payer: MEDICARE

## 2023-02-28 DIAGNOSIS — M25.611 DECREASED RANGE OF MOTION OF RIGHT SHOULDER: Primary | ICD-10-CM

## 2023-02-28 PROCEDURE — 97164 PT RE-EVAL EST PLAN CARE: CPT

## 2023-02-28 PROCEDURE — 97110 THERAPEUTIC EXERCISES: CPT

## 2023-02-28 NOTE — PROGRESS NOTES
"  PROGRESS NOTE  &  Physical Therapy Treatment Note  [address]     Name: Elena Willingham  Clinic Number: [id number]    Therapy Diagnosis:   Encounter Diagnosis   Name Primary?    Decreased range of motion of right shoulder Yes     Physician: Richard Hoffman, *    Visit Date: 2/28/2023    Physician Orders: PT Eval and Treat   Medical Diagnosis from Referral: Primary osteoarthritis of right shoulder   Right shoulder pain, unspecified chronicity   Bicipital tendinitis of right shoulder   Evaluation Date: 12/15/2022  Authorization Period Expiration: 12/29/23  Plan of Care Expiration: 5/28/23  Visit # / Visits authorized: 14 /20     FOTO IE 12/15/22: 41%  FOTO 1/20/23: 42%  FOTO: 65%     Time In: 1100  Time Out: 1150  Total Billable Time: 50 minutes 1:1      Precautions: Standard     Procedure: 12/7/22 Chintan  Right reverse shoulder arthroplasty  Right shoulder open biceps tenodesis    Subjective     Pt reports: she still feels some stiffness but really no complaints    She was partially compliant with home exercise program.  Response to previous treatment: tolerated well  Functional change: stiffness    Pain: 1/10  Location: L shoulder    Objective     DOS: 12/7/22  POD: 11 weeks, 6 days as of 2/28    Functional Shoulder ROM:    Right Left   ER overhead T2 T3   IR behind back L1 T6       Range of Motion:   Shoulder Right   Flexion 145   Abduction 110   ER at 45 Abd 30   ER at 90 Abd  45   ER at 0 Abd 60        Elena received therapeutic exercises to develop strength, endurance, ROM, flexibility, posture and core stabilization for 35 minutes including:   Reassessment  UBE fwd only lvl 3 x 6'   Prone IR and ext AAROM w/ PT 30x each  IR stretch 5 x 30"  IR pulleys 20x5"  IR dowel 20x    Elena received the following manual therapy techniques: Joint mobilizations, Manual traction, Myofacial release, Soft tissue Mobilization, Friction Massage and Functional Dry Needling were applied for 15 minutes, " including:  R shoulder PROM, stretching,   Rythmic stabilization   Contract/relax stretching       Home Exercises Provided and Patient Education Provided     Education provided:   - continue HEP     Written Home Exercises Provided: Patient instructed to cont prior HEP.  Exercises were reviewed and Elena was able to demonstrate them prior to the end of the session.  Elena demonstrated good  understanding of the education provided.     See EMR under Patient Instructions for exercises provided from Initial Evaluation.    Assessment     Patient lacking IR behind back. We switched focus on extension and IR ROM (the two components of IR behind back)  and I updated her HEP. I emphasized she still needs to be doing the other exercises (I could tell her ER was a little stiff at 90 deg abd today). She sees Dr. Woodson next Thursday after our PT aptmt.     Elena is progressing towards her goals.   Pt prognosis is good    Pt will continue to benefit from skilled outpatient physical therapy to address the deficits listed in the problem list box on initial evaluation, provide pt/family education and to maximize pt's level of independence in the home and community environment.     Pt's spiritual, cultural and educational needs considered and pt agreeable to plan of care and goals.     Anticipated barriers to physical therapy: none identified    Goals:   Short Term Goals: 6 weeks - MET   1. Independent with HEP - MET   2. Increase pain-free flexion PROM to 90 degrees - MET   3. Increase pain-free ER PROM to 20 degrees - MET   4. Pt reports R shoulder pain is </= 7/10 - MET      Long Term Goals: 10+ weeks   1. Increase pain-free R shoulder ER ROM to >/=30 degrees - MET   2. Increase pain-free R shoulder flexion AROM to >/=130 degrees - MET  3. Improve deltoid strength to >/=3+/5  4. Pt reports R shoulder pain is </= 2/10  5. Increase IR behind back to = LUE     Plan     Continue with PT AUSTYN Marie, PT

## 2023-02-28 NOTE — Clinical Note
Dr. Woodson, Mrs. Parker is doing good, she was a little stiff in ER today and lacking IR behind her back. We shifted gears today to address her IR today. Overall, she is feeling good, no complaints. We will continue working on her range and deltoid strength. She sees you next Thursday 3/9 @ 9:30 before she sees me. Thank you for referral.

## 2023-03-02 ENCOUNTER — CLINICAL SUPPORT (OUTPATIENT)
Dept: REHABILITATION | Facility: HOSPITAL | Age: 78
End: 2023-03-02
Payer: MEDICARE

## 2023-03-02 DIAGNOSIS — M25.611 DECREASED RANGE OF MOTION OF RIGHT SHOULDER: Primary | ICD-10-CM

## 2023-03-02 PROCEDURE — 97110 THERAPEUTIC EXERCISES: CPT | Mod: CQ

## 2023-03-02 NOTE — PROGRESS NOTES
"  PROGRESS NOTE  &  Physical Therapy Treatment Note  [address]     Name: Elena Willingham  Clinic Number: [id number]    Therapy Diagnosis:   Encounter Diagnosis   Name Primary?    Decreased range of motion of right shoulder Yes     Physician: Richard Hoffman, *    Visit Date: 3/2/2023    Physician Orders: PT Eval and Treat   Medical Diagnosis from Referral: Primary osteoarthritis of right shoulder   Right shoulder pain, unspecified chronicity   Bicipital tendinitis of right shoulder   Evaluation Date: 12/15/2022  Authorization Period Expiration: 12/29/23  Plan of Care Expiration: 5/28/23  Visit # / Visits authorized: 15/20     FOTO IE 12/15/22: 41%  FOTO 1/20/23: 42%  FOTO: 65%     Time In: 1000  Time Out: 1100  Total Billable Time: 54 minutes (1 on 1)     Precautions: Standard     Procedure: 12/7/22 Chintan  Right reverse shoulder arthroplasty  Right shoulder open biceps tenodesis    Subjective     Pt reports: increased soreness since previous tx. Reports compliance with FIR stretches yesterday.     She was partially compliant with home exercise program.  Response to previous treatment: tolerated well  Functional change: stiffness    Pain: not verbalized/10  Location: L shoulder    Objective     DOS: 12/7/22  POD: 12 weeks, 1 days as of 3/2    Functional Shoulder ROM:    Right Left   ER overhead T2 T3   IR behind back L1 T6       Range of Motion:   Shoulder Right   Flexion 145   Abduction 110   ER at 45 Abd 30   ER at 90 Abd  45   ER at 0 Abd 60        Elena received therapeutic exercises to develop strength, endurance, ROM, flexibility, posture and core stabilization for 54 minutes including:     UBE lvl 3 x 4'/4' for UE strengthening/endurance  Flexion walkaway stretch 10 x 10"  Supine ER dowel stretch 10 x 10"   Supine blue dowel flexion 2 x 15  Supine forward punch 2# 4 x 10  4-way rhythmic stab wall ball (unweighted yellow ball) x 20 each   Ext GTB 4 x 10  Landmine 3# 4 x 10  S/L ABD 1# 3 x " 8  S/L flx 1# 3 x 8    Elena received the following manual therapy techniques: Joint mobilizations, Manual traction, Myofacial release, Soft tissue Mobilization, Friction Massage and Functional Dry Needling were applied for 05 minutes, including:    R shoulder PROM flexion/ER to tolerance  Rythmic stabilization   Contract/relax stretching       Home Exercises Provided and Patient Education Provided     Education provided:   - continue HEP     Written Home Exercises Provided: Patient instructed to cont prior HEP.  Exercises were reviewed and Elena was able to demonstrate them prior to the end of the session.  Elena demonstrated good  understanding of the education provided.     See EMR under Patient Instructions for exercises provided from Initial Evaluation.    Assessment     Elena did great today and was able to complete all exercises with no c/o increased pain. She was fatigued by end of tx p focus on functional and OH AROM. D/c FIR stretches at home d/t increased soreness and pt's ability to reach back pocket with ease. No adverse effects reported p tx.     Elena is progressing towards her goals.   Pt prognosis is good    Pt will continue to benefit from skilled outpatient physical therapy to address the deficits listed in the problem list box on initial evaluation, provide pt/family education and to maximize pt's level of independence in the home and community environment.     Pt's spiritual, cultural and educational needs considered and pt agreeable to plan of care and goals.     Anticipated barriers to physical therapy: none identified    Goals:   Short Term Goals: 6 weeks - MET   1. Independent with HEP - MET   2. Increase pain-free flexion PROM to 90 degrees - MET   3. Increase pain-free ER PROM to 20 degrees - MET   4. Pt reports R shoulder pain is </= 7/10 - MET      Long Term Goals: 10+ weeks   1. Increase pain-free R shoulder ER ROM to >/=30 degrees - MET   2. Increase pain-free R shoulder  flexion AROM to >/=130 degrees - MET  3. Improve deltoid strength to >/=3+/5  4. Pt reports R shoulder pain is </= 2/10  5. Increase IR behind back to = LUE     Plan     Continue with PT POC    Yane Longoria, PTA

## 2023-03-07 ENCOUNTER — CLINICAL SUPPORT (OUTPATIENT)
Dept: REHABILITATION | Facility: HOSPITAL | Age: 78
End: 2023-03-07
Payer: MEDICARE

## 2023-03-07 DIAGNOSIS — M25.611 DECREASED RANGE OF MOTION OF RIGHT SHOULDER: Primary | ICD-10-CM

## 2023-03-07 PROCEDURE — 97110 THERAPEUTIC EXERCISES: CPT | Mod: CQ

## 2023-03-07 NOTE — PROGRESS NOTES
"  Physical Therapy Treatment Note  [place] 1st Floor     [facility]  [facility] Number: [id number]    Therapy Diagnosis:   Encounter Diagnosis   Name Primary?    Decreased range of motion of right shoulder Yes     Physician: Richard Hoffman, *    Visit Date: 3/7/2023    Physician Orders: PT Eval and Treat   Medical Diagnosis from Referral: Primary osteoarthritis of right shoulder   Right shoulder pain, unspecified chronicity   Bicipital tendinitis of right shoulder   Evaluation Date: 12/15/2022  Authorization Period Expiration: 12/29/23  Plan of Care Expiration: 5/28/23  Visit # / Visits authorized: 17/20     FOTO IE 12/15/22: 41%  FOTO 1/20/23: 42%  FOTO 2/28/23: 65%     Time In: 1104  Time Out: 1202  Total Billable Time: 30 minutes      Precautions: Standard     Procedure: 12/7/22 Chintan  Right reverse shoulder arthroplasty  Right shoulder open biceps tenodesis    Subjective     Pt reports: no new complaints. Reports compliance with HEP.    She was partially compliant with home exercise program.  Response to previous treatment: tolerated well  Functional change: stiffness    Pain: not verbalized/10  Location: L shoulder    Objective     DOS: 12/7/22  POD: 12 weeks, 6 days as of 3/7    Functional Shoulder ROM:    Right Left   ER overhead T2 T3   IR behind back L1 T6       Range of Motion:   Shoulder Right   Flexion 145   Abduction 110   ER at 45 Abd 30   ER at 90 Abd  45   ER at 0 Abd 60        Elena received therapeutic exercises to develop strength, endurance, ROM, flexibility, posture and core stabilization for 54 minutes including:     UBE lvl 3 x 4'/4' for UE strengthening/endurance  Flexion walkaway stretch 10 x 10"  Supine ER dowel stretch 10 x 10"   Supine blue dowel flexion 2 x 15 x 5"  Supine forward punch 2# 3 x 10  Rhythmic stab wall ball ABC's (unweighted yellow ball) x 2  Ext GTB 4 x 10  Landmine 3# 4 x 10  S/L ABD 1# 3 x 8  S/L flx 1# 3 x 8    Elena received the following manual " therapy techniques: Joint mobilizations, Manual traction, Myofacial release, Soft tissue Mobilization, Friction Massage and Functional Dry Needling were applied for 05 minutes, including:    R shoulder PROM flexion/ER to tolerance  Rythmic stabilization   Contract/relax stretching       Home Exercises Provided and Patient Education Provided     Education provided:   - continue HEP     Written Home Exercises Provided: Patient instructed to cont prior HEP.  Exercises were reviewed and Elena was able to demonstrate them prior to the end of the session.  Elena demonstrated good  understanding of the education provided.     See EMR under Patient Instructions for exercises provided from Initial Evaluation.    Assessment     Elena did great today and was able to complete all exercises with no c/o increased pain. OH strength is slowly improving with time. No adverse effects reported p tx.     Elena is progressing towards her goals.   Pt prognosis is good    Pt will continue to benefit from skilled outpatient physical therapy to address the deficits listed in the problem list box on initial evaluation, provide pt/family education and to maximize pt's level of independence in the home and community environment.     Pt's spiritual, cultural and educational needs considered and pt agreeable to plan of care and goals.     Anticipated barriers to physical therapy: none identified    Goals:   Short Term Goals: 6 weeks - MET   1. Independent with HEP - MET   2. Increase pain-free flexion PROM to 90 degrees - MET   3. Increase pain-free ER PROM to 20 degrees - MET   4. Pt reports R shoulder pain is </= 7/10 - MET      Long Term Goals: 10+ weeks   1. Increase pain-free R shoulder ER ROM to >/=30 degrees - MET   2. Increase pain-free R shoulder flexion AROM to >/=130 degrees - MET  3. Improve deltoid strength to >/=3+/5  4. Pt reports R shoulder pain is </= 2/10  5. Increase IR behind back to = LUE     Plan     Continue with  PT POC    Yane Longoria, PTA

## 2023-03-09 ENCOUNTER — CLINICAL SUPPORT (OUTPATIENT)
Dept: REHABILITATION | Facility: HOSPITAL | Age: 78
End: 2023-03-09
Payer: MEDICARE

## 2023-03-09 ENCOUNTER — OFFICE VISIT (OUTPATIENT)
Dept: SPORTS MEDICINE | Facility: CLINIC | Age: 78
End: 2023-03-09
Payer: MEDICARE

## 2023-03-09 ENCOUNTER — HOSPITAL ENCOUNTER (OUTPATIENT)
Dept: RADIOLOGY | Facility: HOSPITAL | Age: 78
Discharge: HOME | End: 2023-03-09
Attending: ORTHOPAEDIC SURGERY
Payer: MEDICARE

## 2023-03-09 VITALS
BODY MASS INDEX: 26.03 KG/M2 | SYSTOLIC BLOOD PRESSURE: 121 MMHG | DIASTOLIC BLOOD PRESSURE: 76 MMHG | HEART RATE: 78 BPM | HEIGHT: 66 IN | WEIGHT: 162 LBS

## 2023-03-09 DIAGNOSIS — M25.611 DECREASED RANGE OF MOTION OF RIGHT SHOULDER: Primary | ICD-10-CM

## 2023-03-09 DIAGNOSIS — M25.511 RIGHT SHOULDER PAIN, UNSPECIFIED CHRONICITY: ICD-10-CM

## 2023-03-09 DIAGNOSIS — R29.898 SHOULDER WEAKNESS: Primary | ICD-10-CM

## 2023-03-09 DIAGNOSIS — Z96.611 S/P REVERSE TOTAL SHOULDER ARTHROPLASTY, RIGHT: ICD-10-CM

## 2023-03-09 PROCEDURE — 99999 PR PBB SHADOW E&M-EST. PATIENT-LVL III: CPT | Mod: PBBFAC,,, | Performed by: ORTHOPAEDIC SURGERY

## 2023-03-09 PROCEDURE — 99999 PR PBB SHADOW E&M-EST. PATIENT-LVL III: ICD-10-PCS | Mod: PBBFAC,,, | Performed by: ORTHOPAEDIC SURGERY

## 2023-03-09 PROCEDURE — 1159F MED LIST DOCD IN RCRD: CPT | Mod: CPTII,S$GLB,, | Performed by: ORTHOPAEDIC SURGERY

## 2023-03-09 PROCEDURE — 3078F PR MOST RECENT DIASTOLIC BLOOD PRESSURE < 80 MM HG: ICD-10-PCS | Mod: CPTII,S$GLB,, | Performed by: ORTHOPAEDIC SURGERY

## 2023-03-09 PROCEDURE — 3074F PR MOST RECENT SYSTOLIC BLOOD PRESSURE < 130 MM HG: ICD-10-PCS | Mod: CPTII,S$GLB,, | Performed by: ORTHOPAEDIC SURGERY

## 2023-03-09 PROCEDURE — 3074F SYST BP LT 130 MM HG: CPT | Mod: CPTII,S$GLB,, | Performed by: ORTHOPAEDIC SURGERY

## 2023-03-09 PROCEDURE — 99213 PR OFFICE/OUTPT VISIT, EST, LEVL III, 20-29 MIN: ICD-10-PCS | Mod: S$GLB,,, | Performed by: ORTHOPAEDIC SURGERY

## 2023-03-09 PROCEDURE — 3078F DIAST BP <80 MM HG: CPT | Mod: CPTII,S$GLB,, | Performed by: ORTHOPAEDIC SURGERY

## 2023-03-09 PROCEDURE — 73030 X-RAY EXAM OF SHOULDER: CPT | Mod: TC,RT

## 2023-03-09 PROCEDURE — 1126F AMNT PAIN NOTED NONE PRSNT: CPT | Mod: CPTII,S$GLB,, | Performed by: ORTHOPAEDIC SURGERY

## 2023-03-09 PROCEDURE — 99213 OFFICE O/P EST LOW 20 MIN: CPT | Mod: S$GLB,,, | Performed by: ORTHOPAEDIC SURGERY

## 2023-03-09 PROCEDURE — 73030 X-RAY EXAM OF SHOULDER: CPT | Mod: 26,RT,, | Performed by: RADIOLOGY

## 2023-03-09 PROCEDURE — 73030 XR SHOULDER COMPLETE 2 OR MORE VIEWS RIGHT: ICD-10-PCS | Mod: 26,RT,, | Performed by: RADIOLOGY

## 2023-03-09 PROCEDURE — 1126F PR PAIN SEVERITY QUANTIFIED, NO PAIN PRESENT: ICD-10-PCS | Mod: CPTII,S$GLB,, | Performed by: ORTHOPAEDIC SURGERY

## 2023-03-09 PROCEDURE — 1159F PR MEDICATION LIST DOCUMENTED IN MEDICAL RECORD: ICD-10-PCS | Mod: CPTII,S$GLB,, | Performed by: ORTHOPAEDIC SURGERY

## 2023-03-09 PROCEDURE — 97110 THERAPEUTIC EXERCISES: CPT | Mod: CQ

## 2023-03-09 NOTE — PROGRESS NOTES
CC: Right shoulder soreness    DATE OF PROCEDURE: 12/7/2022    PROCEDURES PERFORMED:   Right reverse shoulder arthroplasty(CPT 82699)  Right shoulder open biceps tenodesis (CPT 92442)    She reports to be doing well 12 wk s/p the above-mentioned procedure. Going to PT 1-2xWeek and making improvements. Continues to show great progress. Denies any pain in the shoulder.  States shoulder feels much better compared to preop.  Overall, patient has been doing very well following surgery. She does have some occasional shoulder and bicep soreness on the right side    Pain Score: 0-No pain    PAST MEDICAL HISTORY:   Past Medical History:   Diagnosis Date    Acid reflux     Detached retina, left     Hypertension        PAST SURGICAL HISTORY:  Past Surgical History:   Procedure Laterality Date    ARTHROPLASTY Right 12/7/2022    Procedure: ARTHROPLASTY shoulder reverse;  Surgeon: GAEL Woodson MD;  Location: [facility];  Service: Orthopedics;  Laterality: Right;  interscalene  regional w/o catheter    mass removed Left     shoulder (benign)    PARTIAL HYSTERECTOMY      RETINAL DETACHMENT SURGERY Left        FAMILY HISTORY:  History reviewed. No pertinent family history.    MEDICATIONS:    Current Outpatient Medications:     amLODIPine (NORVASC) 10 MG tablet, Take 10 mg by mouth nightly., Disp: , Rfl: 1    ascorbic acid, vitamin C, (VITAMIN C) 500 MG tablet, Take 500 mg by mouth once daily., Disp: , Rfl:     aspirin (ECOTRIN) 81 MG EC tablet, Take 1 tablet twice a day with food starting after surgery (breakfast and dinner)., Disp: 28 tablet, Rfl: 0    benzonatate (TESSALON PERLES) 100 MG capsule, Take 1 capsule (100 mg total) by mouth 3 (three) times daily as needed for Cough., Disp: 30 capsule, Rfl: 1    carboxymethylcellulose (REFRESH PLUS) 0.5 % Dpet, Place 1 drop into both eyes 2 (two) times daily as needed., Disp: , Rfl:     cholecalciferol, vitamin D3, (VITAMIN D3 ORAL), Take 5,000 Units by mouth once daily., Disp: , Rfl:      cranberry conc/ascorbic acid (CRANBERRY PLUS VITAMIN C ORAL), Take 500 mg by mouth once daily., Disp: , Rfl:     cyanocobalamin (VITAMIN B-12) 1000 MCG tablet, Take 100 mcg by mouth once daily., Disp: , Rfl:     diclofenac sodium (VOLTAREN) 1 % Gel, APPLY 2 GRAMS EXTERNALLY TO THE AFFECTED AREA FOUR TIMES DAILY AS NEEDED, Disp: 100 g, Rfl: 4    dorzolamide-timolol 2-0.5% (COSOPT) 22.3-6.8 mg/mL ophthalmic solution, INSTILL 1 DROP INTO LEFT EYE TWICE DAILY, Disp: , Rfl:     esomeprazole (NEXIUM) 40 MG capsule, , Disp: , Rfl:     fluticasone propionate (FLONASE) 50 mcg/actuation nasal spray, 1 spray (50 mcg total) by Each Nostril route once daily., Disp: 11.1 mL, Rfl: 0    folic acid (FOLVITE) 800 MCG Tab, Take 800 mcg by mouth once daily., Disp: , Rfl:     gabapentin (NEURONTIN) 300 MG capsule, Take 300 mg by mouth nightly as needed., Disp: , Rfl: 0    montelukast (SINGULAIR) 10 mg tablet, Take 10 mg by mouth daily as needed., Disp: , Rfl:     ondansetron (ZOFRAN) 4 MG tablet, Take 1 tablet (4 mg total) by mouth every 8 (eight) hours as needed for Nausea., Disp: 30 tablet, Rfl: 0    vitamin E 400 UNIT capsule, Take 400 Units by mouth once daily., Disp: , Rfl:     celecoxib (CELEBREX) 200 MG capsule, Take 1 capsule (200 mg total) by mouth once daily. (Patient not taking: Reported on 3/9/2023), Disp: 30 capsule, Rfl: 0    HYDROcodone-acetaminophen (NORCO)  mg per tablet, Take 1 tablet by mouth every 12 (twelve) hours as needed for Pain. (Patient not taking: Reported on 3/9/2023), Disp: 14 tablet, Rfl: 0    ALLERGIES:  Review of patient's allergies indicates:   Allergen Reactions    Darvocet a500 [propoxyphene n-acetaminophen] Hallucinations    Percocet [oxycodone-acetaminophen] Hallucinations    Percodan [oxycodone-aspirin] Hallucinations    Tramadol Other (See Comments)     Insomnia        REVIEW OF SYSTEMS:  Constitution: Negative. Negative for chills, fever and night sweats.    Hematologic/Lymphatic:  "Negative for bleeding problem. Does not bruise/bleed easily.   Skin: Negative for dry skin, itching and rash.   Musculoskeletal: Negative for falls. Neg for right shoulder pain and muscle weakness.     All other review of symptoms were reviewed and found to be noncontributory.     PHYSICAL EXAMINATION:  Vitals:  /76   Pulse 78   Ht 5' 6" (1.676 m)   Wt 73.5 kg (162 lb)   BMI 26.15 kg/m²    General: Well-developed well-nourished 77 y.o. femalein no acute distress   Cardiovascular: Regular rhythm by palpation of distal pulse, normal color and temperature, no concerning varicosities on symptomatic side   Lungs: No labored breathing or wheezing appreciated   Neuro: Alert and oriented ×3   Psychiatric: well oriented to person, place and time, demonstrates normal mood and affect   Skin: No rashes, lesions or ulcers, normal temperature, turgor, and texture on uninvolved extremity    Ortho/SPM Exam  Exam of the R shoulder demonstrates a well-healed incision.  No signs of infection.  Sensation intact to light touch over the axillary nerve distribution.  Active forward elevation in the scapular plane to 130 with ease.  Able to get hand to the top and back of his head.  No pain.  Passive forward elevation to 150 degrees.  Passive external rotation to 55 degrees. Active external rotation with arm at side to 40. IT to back pocket.    IMAGING:  Xrays including AP, Outlet and Axillary Lateral of RIGHT shoulder are ordered / images reviewed by me:   Stable reverse shoulder prosthesis with no fracture seen.  No signs of loosening of the prosthesis.    ASSESSMENT:      ICD-10-CM ICD-9-CM   1. Shoulder weakness  R29.898 719.61   2. S/P reverse total shoulder arthroplasty, right  Z96.611 V43.61       PLAN:     -Findings and treatment options were discussed with the patient  --Doing very well with her operative shoulder.    -No pain issues.  -Making good progress with therapy    -Stable prosthesis.    -Discussed no strenuous " activity or heavy lifting.   -RTC 3 months  -All questions answered      Procedures

## 2023-03-09 NOTE — PROGRESS NOTES
"  PROGRESS NOTE  &  Physical Therapy Treatment Note  [place] 1st [address]     Name: Eelna Willingham  Clinic Number: [id number]    Therapy Diagnosis:   Encounter Diagnosis   Name Primary?    Decreased range of motion of right shoulder Yes     Physician: Richard Hoffman, *    Visit Date: 3/9/2023    Physician Orders: PT Eval and Treat   Medical Diagnosis from Referral: Primary osteoarthritis of right shoulder   Right shoulder pain, unspecified chronicity   Bicipital tendinitis of right shoulder   Evaluation Date: 12/15/2022  Authorization Period Expiration: 12/29/23  Plan of Care Expiration: 5/28/23  Visit # / Visits authorized: 17/20     FOTO IE 12/15/22: 41%  FOTO 1/20/23: 42%  FOTO 2/28/23: 65%     Time In: 1100  Time Out: 1200  Total Billable Time: 23 minutes     Precautions: Standard     Procedure: 12/7/22 Chintan  Right reverse shoulder arthroplasty  Right shoulder open biceps tenodesis    Subjective     Pt reports: no new complaints. Reports compliance with HEP.    She was partially compliant with home exercise program.  Response to previous treatment: tolerated well  Functional change: stiffness    Pain: not verbalized/10  Location: L shoulder    Objective     DOS: 12/7/22  POD: 12 weeks, 6 days as of 3/7    Functional Shoulder ROM:    Right Left   ER overhead T2 T3   IR behind back L1 T6       Range of Motion:   Shoulder Right   Flexion 145   Abduction 110   ER at 45 Abd 30   ER at 90 Abd  45   ER at 0 Abd 60        Elena received therapeutic exercises to develop strength, endurance, ROM, flexibility, posture and core stabilization for 54 minutes including:     UBE lvl 3 x 4'/4' for UE strengthening/endurance  Flexion walkaway stretch 10 x 10"  Supine ER dowel stretch 10 x 10"   Supine blue dowel flexion 2 x 15 x 5"  Supine flexion to 90deg 2# 3 x 8-10  Rhythmic stab wall ball ABC's (unweighted yellow ball) x 2  Ext GTB 4 x 10  Landmine 3# 4 x 10 - np  S/L ABD 1# 3 x 10  S/L flx 1# 3 x 10  Standing " forward punch to 90deg YTB 3 x 8-12  Standing flx YTB derek Parker received the following manual therapy techniques: Joint mobilizations, Manual traction, Myofacial release, Soft tissue Mobilization, Friction Massage and Functional Dry Needling were applied for 05 minutes, including:    R shoulder PROM flexion/ER to tolerance  Rythmic stabilization   Contract/relax stretching       Home Exercises Provided and Patient Education Provided     Education provided:   - continue HEP     Written Home Exercises Provided: Patient instructed to cont prior HEP.  Exercises were reviewed and Elena was able to demonstrate them prior to the end of the session.  Elena demonstrated good  understanding of the education provided.     See EMR under Patient Instructions for exercises provided from Initial Evaluation.    Assessment     Elena did great today and was able to complete all exercises with no c/o increased pain. Good tolerance to of progressed flexion activities with rest breaks required. Issued YTB and demonstrated standing resisted flexion to 90deg and added to HEP. No adverse effects reported p tx.     Elena is progressing towards her goals.   Pt prognosis is good    Pt will continue to benefit from skilled outpatient physical therapy to address the deficits listed in the problem list box on initial evaluation, provide pt/family education and to maximize pt's level of independence in the home and community environment.     Pt's spiritual, cultural and educational needs considered and pt agreeable to plan of care and goals.     Anticipated barriers to physical therapy: none identified    Goals:   Short Term Goals: 6 weeks - MET   1. Independent with HEP - MET   2. Increase pain-free flexion PROM to 90 degrees - MET   3. Increase pain-free ER PROM to 20 degrees - MET   4. Pt reports R shoulder pain is </= 7/10 - MET      Long Term Goals: 10+ weeks   1. Increase pain-free R shoulder ER ROM to >/=30 degrees - MET   2.  Increase pain-free R shoulder flexion AROM to >/=130 degrees - MET  3. Improve deltoid strength to >/=3+/5  4. Pt reports R shoulder pain is </= 2/10  5. Increase IR behind back to = LUE     Plan     Continue with PT POC    Yane Longoria, PTA

## 2023-03-14 ENCOUNTER — CLINICAL SUPPORT (OUTPATIENT)
Dept: REHABILITATION | Facility: HOSPITAL | Age: 78
End: 2023-03-14
Payer: MEDICARE

## 2023-03-14 DIAGNOSIS — M25.611 DECREASED RANGE OF MOTION OF RIGHT SHOULDER: Primary | ICD-10-CM

## 2023-03-14 PROCEDURE — 97110 THERAPEUTIC EXERCISES: CPT

## 2023-03-14 NOTE — PROGRESS NOTES
"  Physical Therapy Treatment Note  [place] 1st Floor     [facility]  Cl[facility] Number: [id number]    Therapy Diagnosis:   Encounter Diagnosis   Name Primary?    Decreased range of motion of right shoulder Yes     Physician: Richard Hoffman, *    Visit Date: 3/14/2023    Physician Orders: PT Eval and Treat   Medical Diagnosis from Referral: Primary osteoarthritis of right shoulder   Right shoulder pain, unspecified chronicity   Bicipital tendinitis of right shoulder   Evaluation Date: 12/15/2022  Authorization Period Expiration: 12/29/23  Plan of Care Expiration: 5/28/23  Visit # / Visits authorized: 18 /20     FOTO IE 12/15/22: 41%  FOTO 1/20/23: 42%  FOTO 2/28/23: 65%     Time In: 1100  Time Out: 1155  Total Billable Time: 55 minutes, 30 min 1:1     Precautions: Standard     Procedure: 12/7/22 Chintan  Right reverse shoulder arthroplasty  Right shoulder open biceps tenodesis    Subjective     Pt reports: no new complaints. Feels like arm is getting stronger.     She was partially compliant with home exercise program.  Response to previous treatment: tolerated well  Functional change: stiffness    Pain: 0/10  Location: L shoulder    Objective     DOS: 12/7/22  POD: 13 weeks, 6 days as of 3/14    Functional Shoulder ROM:    Right Left   ER overhead T2 T3   IR behind back L1 T6       Range of Motion:   Shoulder Right   Flexion 145   Abduction 110   ER at 45 Abd 30   ER at 90 Abd  45   ER at 0 Abd 60        Elena received therapeutic exercises to develop strength, endurance, ROM, flexibility, posture and core stabilization for 55 minutes including:     UBE lvl 3 x 4'/4' for UE strengthening/endurance  Flexion walkaway stretch 10 x 10"  Supine ER dowel stretch 10 x 10"   Supine orange dowel flexion 2 x 15 x 5"  Supine flexion to 90deg 2# 3 x 8-10  Rhythmic stab wall ball ABC's (unweighted yellow ball) x 2  Ext GTB 3" x 3 x 10  Landmine 5# 4 x 10 - np  S/L ABD 2# 3 x 10  S/L flx 2# 3 x 10  S/L hor abd 2# " 3 x 10  Ball on wall taps in full flexion 10 x 5  Standing flx from high mat 2 x 10 demo        Home Exercises Provided and Patient Education Provided     Education provided:   - continue HEP     Written Home Exercises Provided: Patient instructed to cont prior HEP.  Exercises were reviewed and Elena was able to demonstrate them prior to the end of the session.  Elena demonstrated good  understanding of the education provided.     See EMR under Patient Instructions for exercises provided from Initial Evaluation.    Assessment     Pt progressed flexion activities with rest breaks required. Continue to work on flexion endurance and lower trap strength.    Elena is progressing towards her goals.   Pt prognosis is good    Pt will continue to benefit from skilled outpatient physical therapy to address the deficits listed in the problem list box on initial evaluation, provide pt/family education and to maximize pt's level of independence in the home and community environment.     Pt's spiritual, cultural and educational needs considered and pt agreeable to plan of care and goals.     Anticipated barriers to physical therapy: none identified    Goals:   Short Term Goals: 6 weeks - MET   1. Independent with HEP - MET   2. Increase pain-free flexion PROM to 90 degrees - MET   3. Increase pain-free ER PROM to 20 degrees - MET   4. Pt reports R shoulder pain is </= 7/10 - MET      Long Term Goals: 10+ weeks   1. Increase pain-free R shoulder ER ROM to >/=30 degrees - MET   2. Increase pain-free R shoulder flexion AROM to >/=130 degrees - MET  3. Improve deltoid strength to >/=3+/5  4. Pt reports R shoulder pain is </= 2/10  5. Increase IR behind back to = LUE     Plan     Continue with PT AUSTYN Marie, PT

## 2023-03-16 ENCOUNTER — CLINICAL SUPPORT (OUTPATIENT)
Dept: REHABILITATION | Facility: HOSPITAL | Age: 78
End: 2023-03-16
Payer: MEDICARE

## 2023-03-16 DIAGNOSIS — M25.611 DECREASED RANGE OF MOTION OF RIGHT SHOULDER: Primary | ICD-10-CM

## 2023-03-16 PROCEDURE — 97110 THERAPEUTIC EXERCISES: CPT

## 2023-03-16 PROCEDURE — 97530 THERAPEUTIC ACTIVITIES: CPT

## 2023-03-16 NOTE — PROGRESS NOTES
Physical Therapy Treatment Note  [place] 1st Floor     [id number]    Therapy Diagnosis:   Encounter Diagnosis   Name Primary?    Decreased range of motion of right shoulder Yes     Physician: Richard Hoffman, *    Visit Date: 3/16/2023    Physician Orders: PT Eval and Treat   Medical Diagnosis from Referral: Primary osteoarthritis of right shoulder   Right shoulder pain, unspecified chronicity   Bicipital tendinitis of right shoulder   Evaluation Date: 12/15/2022  Authorization Period Expiration: 12/29/23  Plan of Care Expiration: 5/28/23  Visit # / Visits authorized: 19 /20     FOTO IE 12/15/22: 41%  FOTO 1/20/23: 42%  FOTO 2/28/23: 65%     Time In: 1100  Time Out: 1153  Total Billable Time: 53 minutes 1:1     Precautions: Standard     Procedure: 12/7/22 Chintan  Right reverse shoulder arthroplasty  Right shoulder open biceps tenodesis    Subjective     Pt reports: no complaints. Felt fine after last session.     She was partially compliant with home exercise program.  Response to previous treatment: tolerated well  Functional change: stiffness    Pain: 0/10  Location: L shoulder    Objective     DOS: 12/7/22  POD: 14 weeks, 1 days as of 3/16    Functional Shoulder ROM:    Right Left   ER overhead T2 T3   IR behind back L1 T6     Range of Motion:   Shoulder Right   Flexion 145   Abduction 110   ER at 45 Abd 30   ER at 90 Abd  45   ER at 0 Abd 60        Elena received therapeutic exercises to develop strength, endurance, ROM, flexibility, posture and core stabilization for 29 minutes including:     UBE lvl 3 x 4'/4' for UE strengthening/endurance  Pulleys 3 ways (flex/abd/IR) 20x each  Dowel Extension 30x  Dowel IR behind back 30x  Supine Dowel Flexion 2# 30x  Supine dowel ER 2# 30x    Elena participated in dynamic functional therapeutic activities to improve functional performance for 24  minutes, including:  Landmine 4# 3 x 10  S/L Deltoid 2# 3 x 10  S/L short  "lever delt 3 x 15  Overhead press for shelf reach 10x    Not Today:  Flexion walkaway stretch 10 x 10"  Supine ER dowel stretch 10 x 10"   Supine orange dowel flexion 2 x 15 x 5"  Supine flexion to 90deg 2# 3 x 8-10  Rhythmic stab wall ball ABC's (unweighted yellow ball) x 2  Ext GTB 3" x 3 x 10  Landmine 5# 4 x 10 - np  S/L flx 2# 3 x 10  S/L hor abd 2# 3 x 10  Ball on wall taps in full flexion 10 x 5  Standing flx from high mat 2 x 10 demo        Home Exercises Provided and Patient Education Provided     Education provided:   - continue HEP     Written Home Exercises Provided: Patient instructed to cont prior HEP.  Exercises were reviewed and Elena was able to demonstrate them prior to the end of the session.  Elena demonstrated good  understanding of the education provided.     See EMR under Patient Instructions for exercises provided from Initial Evaluation.    Assessment     Pt required cueing for elbow extension in deltoid. We added short lever deltoid activities for lateral movement. Continue to work on overhead reach to improve functional mobility.    Elena is progressing towards her goals.   Pt prognosis is good    Pt will continue to benefit from skilled outpatient physical therapy to address the deficits listed in the problem list box on initial evaluation, provide pt/family education and to maximize pt's level of independence in the home and community environment.     Pt's spiritual, cultural and educational needs considered and pt agreeable to plan of care and goals.     Anticipated barriers to physical therapy: none identified    Goals:   Short Term Goals: 6 weeks - MET   1. Independent with HEP - MET   2. Increase pain-free flexion PROM to 90 degrees - MET   3. Increase pain-free ER PROM to 20 degrees - MET   4. Pt reports R shoulder pain is </= 7/10 - MET      Long Term Goals: 10+ weeks   1. Increase pain-free R shoulder ER ROM to >/=30 degrees - MET   2. Increase pain-free R shoulder flexion " AROM to >/=130 degrees - MET  3. Improve deltoid strength to >/=3+/5  4. Pt reports R shoulder pain is </= 2/10  5. Increase IR behind back to = LUE     Plan     Continue with PT POC    Ashli Marie, PT

## 2023-03-21 ENCOUNTER — CLINICAL SUPPORT (OUTPATIENT)
Dept: REHABILITATION | Facility: HOSPITAL | Age: 78
End: 2023-03-21
Payer: MEDICARE

## 2023-03-21 DIAGNOSIS — M25.611 DECREASED RANGE OF MOTION OF RIGHT SHOULDER: Primary | ICD-10-CM

## 2023-03-21 PROCEDURE — 97530 THERAPEUTIC ACTIVITIES: CPT

## 2023-03-21 PROCEDURE — 97110 THERAPEUTIC EXERCISES: CPT

## 2023-03-21 NOTE — PROGRESS NOTES
"  Physical Therapy Treatment Note  [address]     [facility]  [facility] [id number]    Therapy Diagnosis:   Encounter Diagnosis   Name Primary?    Decreased range of motion of right shoulder Yes     Physician: Richard Hoffman, *    Visit Date: 3/21/2023    Physician Orders: PT Eval and Treat   Medical Diagnosis from Referral: Primary osteoarthritis of right shoulder   Right shoulder pain, unspecified chronicity   Bicipital tendinitis of right shoulder   Evaluation Date: 12/15/2022  Authorization Period Expiration: 12/29/23  Plan of Care Expiration: 5/28/23  Visit # / Visits authorized: 19 /20     FOTO IE 12/15/22: 41%  FOTO 1/20/23: 42%  FOTO 2/28/23: 65%     Time In: 1105  Time Out: 1155  Total Billable Time: 30 minutes 1:1     Precautions: Standard     Procedure: 12/7/22 Chintan  Right reverse shoulder arthroplasty  Right shoulder open biceps tenodesis    Subjective     Pt reports: sore from gardening this weekend. .     She was partially compliant with home exercise program.  Response to previous treatment: tolerated well  Functional change: stiffness    Pain: 0/10  Location: L shoulder    Objective     DOS: 12/7/22  POD: 14 weeks, 6 days as of 3/21    Functional Shoulder ROM:    Right Left   ER overhead T2 T3   IR behind back L1 T6     Range of Motion:   Shoulder Right   Flexion 145   Abduction 110   ER at 45 Abd 30   ER at 90 Abd  45   ER at 0 Abd 60        Elena received therapeutic exercises to develop strength, endurance, ROM, flexibility, posture and core stabilization for 40 minutes including:   UBE lvl 3 x 4'/4' for UE strengthening/endurance  Flexion walkaway stretch 10 x 10"  Pulleys 2 ways (abd/IR) 20x each  Dowel Extension 30x  Dowel IR behind back 30x  Supine Dowel Flexion 2# 30x  Supine dowel ER 2# 30x    Elena participated in dynamic functional therapeutic activities to improve functional performance for 10  minutes, including:  Landmine 4# 3 x 10  S/L Deltoid 2# 3 x " "10  S/L short lever delt 3 x 15  Overhead press for shelf reach 10x    Not Today:    Supine ER dowel stretch 10 x 10"   Supine orange dowel flexion 2 x 15 x 5"  Supine flexion to 90deg 2# 3 x 8-10  Rhythmic stab wall ball ABC's (unweighted yellow ball) x 2  Ext GTB 3" x 3 x 10  Landmine 5# 4 x 10 - np  S/L flx 2# 3 x 10  S/L hor abd 2# 3 x 10  Ball on wall taps in full flexion 10 x 5  Standing flx from high mat 2 x 10 demo        Home Exercises Provided and Patient Education Provided     Education provided:   - continue HEP     Written Home Exercises Provided: Patient instructed to cont prior HEP.  Exercises were reviewed and Elena was able to demonstrate them prior to the end of the session.  Elena demonstrated good  understanding of the education provided.     See EMR under Patient Instructions for exercises provided from Initial Evaluation.    Assessment     Pt presented sore from gardening over the weekend. She did well today. Continue to progress deltoid strength.    Elena is progressing towards her goals.   Pt prognosis is good    Pt will continue to benefit from skilled outpatient physical therapy to address the deficits listed in the problem list box on initial evaluation, provide pt/family education and to maximize pt's level of independence in the home and community environment.     Pt's spiritual, cultural and educational needs considered and pt agreeable to plan of care and goals.     Anticipated barriers to physical therapy: none identified    Goals:   Short Term Goals: 6 weeks - MET   1. Independent with HEP - MET   2. Increase pain-free flexion PROM to 90 degrees - MET   3. Increase pain-free ER PROM to 20 degrees - MET   4. Pt reports R shoulder pain is </= 7/10 - MET      Long Term Goals: 10+ weeks   1. Increase pain-free R shoulder ER ROM to >/=30 degrees - MET   2. Increase pain-free R shoulder flexion AROM to >/=130 degrees - MET  3. Improve deltoid strength to >/=3+/5  4. Pt reports R " shoulder pain is </= 2/10  5. Increase IR behind back to = LUE     Plan     Continue with PT POC    Ashli Marie, PT, DPT

## 2023-03-28 ENCOUNTER — CLINICAL SUPPORT (OUTPATIENT)
Dept: REHABILITATION | Facility: HOSPITAL | Age: 78
End: 2023-03-28
Payer: MEDICARE

## 2023-03-28 DIAGNOSIS — M25.611 DECREASED RANGE OF MOTION OF RIGHT SHOULDER: Primary | ICD-10-CM

## 2023-03-28 PROCEDURE — 97530 THERAPEUTIC ACTIVITIES: CPT | Mod: CQ

## 2023-03-28 PROCEDURE — 97112 NEUROMUSCULAR REEDUCATION: CPT | Mod: CQ

## 2023-03-28 NOTE — PROGRESS NOTES
"  Physical Therapy Treatment Note  [place] 1st Floor     [facility]  [facility] Number: [id number]    Therapy Diagnosis:   Encounter Diagnosis   Name Primary?    Decreased range of motion of right shoulder Yes     Physician: Richard Hoffman, *    Visit Date: 3/28/2023    Physician Orders: PT Eval and Treat   Medical Diagnosis from Referral: Primary osteoarthritis of right shoulder   Right shoulder pain, unspecified chronicity   Bicipital tendinitis of right shoulder   Evaluation Date: 12/15/2022  Authorization Period Expiration: 12/29/23  Plan of Care Expiration: 5/28/23  Visit # / Visits authorized: 21/40     FOTO IE 12/15/22: 41%  FOTO 1/20/23: 42%  FOTO 2/28/23: 65%     Time In: 1105  Time Out: 1155  Total Billable Time: 26 minutes     Precautions: Standard     Procedure: 12/7/22 Chintan  Right reverse shoulder arthroplasty  Right shoulder open biceps tenodesis    Subjective     Pt reports: sore from gardening this weekend.     She was partially compliant with home exercise program.  Response to previous treatment: tolerated well  Functional change: stiffness    Pain: 0/10  Location: L shoulder    Objective     DOS: 12/7/22  POD: 15 weeks, 6 days as of 3/28    Functional Shoulder ROM:    Right Left   ER overhead T2 T3   IR behind back L1 T6     Range of Motion:   Shoulder Right   Flexion 145   Abduction 110   ER at 45 Abd 30   ER at 90 Abd  45   ER at 0 Abd 60        Elena received therapeutic exercises to develop strength, endurance, ROM, flexibility, posture and core stabilization for 26 minutes including:     UBE lvl 4 x 4'/4' for UE strengthening/endurance  Flexion walkaway stretch 10 x 10"  Pulleys (scaption) x 4'  Supine AA blue dowel flexion 20 x 5"   Supine ER dowel stretch 10 x 10"    Elena participated in neuromuscular re-education activities to improve: Coordination, Posture, and Neuromuscular Control for 18 minutes. The following activities were included:    Rhythmic stab wall ball " "ABC's (unweighted yellow ball) x 2  Ext GTB 3 x 12 x 3"   Standing handcuffs RTB 3 x 8-10    Elena participated in dynamic functional therapeutic activities to improve functional performance for 10 minutes, including:    Landmine 4# 3 x 10  Diagonal wall slides 3 x 8    Not Today:  S/L Deltoid 2# 3 x 10  S/L short lever delt 3 x 15  Overhead press for shelf reach 10x  Supine flexion to 90deg 2# 3 x 8-10        Home Exercises Provided and Patient Education Provided     Education provided:   - continue HEP     Written Home Exercises Provided: Patient instructed to cont prior HEP.  Exercises were reviewed and Elena was able to demonstrate them prior to the end of the session.  Elena demonstrated good  understanding of the education provided.     See EMR under Patient Instructions for exercises provided from Initial Evaluation.    Assessment     Elena did well today. She was challenged by progressed OH activities, especially diagonal wall slides. Pt was fatigued by end of tx. Encouraged continued compliance with HEP and activity modification; pt voiced understanding. No adverse effects reported p tx.     Elena is progressing towards her goals.   Pt prognosis is good    Pt will continue to benefit from skilled outpatient physical therapy to address the deficits listed in the problem list box on initial evaluation, provide pt/family education and to maximize pt's level of independence in the home and community environment.     Pt's spiritual, cultural and educational needs considered and pt agreeable to plan of care and goals.     Anticipated barriers to physical therapy: none identified    Goals:   Short Term Goals: 6 weeks - MET   1. Independent with HEP - MET   2. Increase pain-free flexion PROM to 90 degrees - MET   3. Increase pain-free ER PROM to 20 degrees - MET   4. Pt reports R shoulder pain is </= 7/10 - MET      Long Term Goals: 10+ weeks   1. Increase pain-free R shoulder ER ROM to >/=30 degrees - " MET   2. Increase pain-free R shoulder flexion AROM to >/=130 degrees - MET  3. Improve deltoid strength to >/=3+/5  4. Pt reports R shoulder pain is </= 2/10  5. Increase IR behind back to = LUE     Plan     Continue with PT POC    Yane Longoria, PTA

## 2023-04-06 ENCOUNTER — CLINICAL SUPPORT (OUTPATIENT)
Dept: REHABILITATION | Facility: HOSPITAL | Age: 78
End: 2023-04-06
Payer: MEDICARE

## 2023-04-06 DIAGNOSIS — M25.611 DECREASED RANGE OF MOTION OF RIGHT SHOULDER: Primary | ICD-10-CM

## 2023-04-06 PROCEDURE — 97110 THERAPEUTIC EXERCISES: CPT

## 2023-04-06 NOTE — PROGRESS NOTES
DISCHARGE  &  Physical Therapy Treatment Note  [place] 1st Floor     Name: Elena Willingham  Clinic Number: [id number]    Therapy Diagnosis:   Encounter Diagnosis   Name Primary?    Decreased range of motion of right shoulder Yes     Physician: Richard Hoffman, *    Visit Date: 4/6/2023    Physician Orders: PT Eval and Treat   Medical Diagnosis from Referral: Primary osteoarthritis of right shoulder   Right shoulder pain, unspecified chronicity   Bicipital tendinitis of right shoulder   Evaluation Date: 12/15/2022  Authorization Period Expiration: 12/29/23  Plan of Care Expiration: 5/28/23  Visit # / Visits authorized: 22 / 40     FOTO IE 12/15/22: 41%  FOTO 1/20/23: 42%  FOTO 2/28/23: 65%  FOTO DC: 80%    Time In: 1055  Time Out: 1115  Total Billable Time: 20 minutes     Precautions: Standard     Procedure: 12/7/22 Chintan  Right reverse shoulder arthroplasty  Right shoulder open biceps tenodesis    Subjective     Pt reports: she is doing great and is ready to discharge.     She was partially compliant with home exercise program.  Response to previous treatment: tolerated well  Functional change: stiffness    Pain: 0/10  Location: L shoulder    Objective     DOS: 12/7/22  POD: 16 weeks, 2 days as of 4/6    Functional Shoulder ROM:    Right Left   ER overhead T3 T3   IR behind back T8 T8     Range of Motion:   Shoulder Right   Flexion 150   Abduction 110   ER at 45 Abd 30   ER at 90 Abd  45   ER at 0 Abd 60      STRENGTH MMT in RUE = 3+/5    Elena received therapeutic exercises to develop strength, endurance, ROM, flexibility, posture and core stabilization for 15 minutes including:     REVIEW OF HEP  Re-assessment  UBE lvl 4 x 4'/4' for UE strengthening/endurance    Home Exercises Provided and Patient Education Provided     Education provided:   - continue HEP     Written Home Exercises Provided: Patient instructed to cont prior HEP.  Exercises were reviewed and Elena was able to demonstrate them prior  to the end of the session.  Elena demonstrated good  understanding of the education provided.     See EMR under Patient Instructions for exercises provided from Initial Evaluation.    Assessment     Patient able to do everything she wants to do and has met PT goals. She will DC today and scored an 80% on FOTO.     Elena is progressing towards her goals.   Pt prognosis is good    Pt will continue to benefit from skilled outpatient physical therapy to address the deficits listed in the problem list box on initial evaluation, provide pt/family education and to maximize pt's level of independence in the home and community environment.     Pt's spiritual, cultural and educational needs considered and pt agreeable to plan of care and goals.     Anticipated barriers to physical therapy: none identified    Goals:   Short Term Goals: 6 weeks - MET   1. Independent with HEP - MET   2. Increase pain-free flexion PROM to 90 degrees - MET   3. Increase pain-free ER PROM to 20 degrees - MET   4. Pt reports R shoulder pain is </= 7/10 - MET      Long Term Goals: 10+ weeks  - MET   1. Increase pain-free R shoulder ER ROM to >/=30 degrees - MET   2. Increase pain-free R shoulder flexion AROM to >/=130 degrees - MET  3. Improve deltoid strength to >/=3+/5 - MET   4. Pt reports R shoulder pain is </= 2/10 - MET   5. Increase IR behind back to = LUE - MET     Plan     Continue with PT AUSTYN Marie, PT

## 2023-04-25 ENCOUNTER — OFFICE VISIT (OUTPATIENT)
Dept: SPORTS MEDICINE | Facility: CLINIC | Age: 78
End: 2023-04-25
Payer: MEDICARE

## 2023-04-25 ENCOUNTER — HOSPITAL ENCOUNTER (OUTPATIENT)
Dept: RADIOLOGY | Facility: HOSPITAL | Age: 78
Discharge: HOME | End: 2023-04-25
Attending: ORTHOPAEDIC SURGERY
Payer: MEDICARE

## 2023-04-25 VITALS
HEART RATE: 110 BPM | DIASTOLIC BLOOD PRESSURE: 77 MMHG | HEIGHT: 66 IN | SYSTOLIC BLOOD PRESSURE: 114 MMHG | BODY MASS INDEX: 24.56 KG/M2 | WEIGHT: 152.81 LBS

## 2023-04-25 DIAGNOSIS — R29.898 SHOULDER WEAKNESS: Primary | ICD-10-CM

## 2023-04-25 DIAGNOSIS — Z96.611 S/P REVERSE TOTAL SHOULDER ARTHROPLASTY, RIGHT: ICD-10-CM

## 2023-04-25 DIAGNOSIS — R29.898 SHOULDER WEAKNESS: ICD-10-CM

## 2023-04-25 PROCEDURE — 3078F PR MOST RECENT DIASTOLIC BLOOD PRESSURE < 80 MM HG: ICD-10-PCS | Mod: CPTII,S$GLB,, | Performed by: ORTHOPAEDIC SURGERY

## 2023-04-25 PROCEDURE — 3078F DIAST BP <80 MM HG: CPT | Mod: CPTII,S$GLB,, | Performed by: ORTHOPAEDIC SURGERY

## 2023-04-25 PROCEDURE — 99214 OFFICE O/P EST MOD 30 MIN: CPT | Mod: S$GLB,,, | Performed by: ORTHOPAEDIC SURGERY

## 2023-04-25 PROCEDURE — 1101F PR PT FALLS ASSESS DOC 0-1 FALLS W/OUT INJ PAST YR: ICD-10-PCS | Mod: CPTII,S$GLB,, | Performed by: ORTHOPAEDIC SURGERY

## 2023-04-25 PROCEDURE — 3288F PR FALLS RISK ASSESSMENT DOCUMENTED: ICD-10-PCS | Mod: CPTII,S$GLB,, | Performed by: ORTHOPAEDIC SURGERY

## 2023-04-25 PROCEDURE — 1159F MED LIST DOCD IN RCRD: CPT | Mod: CPTII,S$GLB,, | Performed by: ORTHOPAEDIC SURGERY

## 2023-04-25 PROCEDURE — 73030 X-RAY EXAM OF SHOULDER: CPT | Mod: TC,RT

## 2023-04-25 PROCEDURE — 73030 XR SHOULDER COMPLETE 2 OR MORE VIEWS RIGHT: ICD-10-PCS | Mod: 26,RT,, | Performed by: RADIOLOGY

## 2023-04-25 PROCEDURE — 1126F AMNT PAIN NOTED NONE PRSNT: CPT | Mod: CPTII,S$GLB,, | Performed by: ORTHOPAEDIC SURGERY

## 2023-04-25 PROCEDURE — 1159F PR MEDICATION LIST DOCUMENTED IN MEDICAL RECORD: ICD-10-PCS | Mod: CPTII,S$GLB,, | Performed by: ORTHOPAEDIC SURGERY

## 2023-04-25 PROCEDURE — 99214 PR OFFICE/OUTPT VISIT, EST, LEVL IV, 30-39 MIN: ICD-10-PCS | Mod: S$GLB,,, | Performed by: ORTHOPAEDIC SURGERY

## 2023-04-25 PROCEDURE — 73030 X-RAY EXAM OF SHOULDER: CPT | Mod: 26,RT,, | Performed by: RADIOLOGY

## 2023-04-25 PROCEDURE — 3288F FALL RISK ASSESSMENT DOCD: CPT | Mod: CPTII,S$GLB,, | Performed by: ORTHOPAEDIC SURGERY

## 2023-04-25 PROCEDURE — 3074F SYST BP LT 130 MM HG: CPT | Mod: CPTII,S$GLB,, | Performed by: ORTHOPAEDIC SURGERY

## 2023-04-25 PROCEDURE — 99999 PR PBB SHADOW E&M-EST. PATIENT-LVL IV: CPT | Mod: PBBFAC,,, | Performed by: ORTHOPAEDIC SURGERY

## 2023-04-25 PROCEDURE — 3074F PR MOST RECENT SYSTOLIC BLOOD PRESSURE < 130 MM HG: ICD-10-PCS | Mod: CPTII,S$GLB,, | Performed by: ORTHOPAEDIC SURGERY

## 2023-04-25 PROCEDURE — 99999 PR PBB SHADOW E&M-EST. PATIENT-LVL IV: ICD-10-PCS | Mod: PBBFAC,,, | Performed by: ORTHOPAEDIC SURGERY

## 2023-04-25 PROCEDURE — 1126F PR PAIN SEVERITY QUANTIFIED, NO PAIN PRESENT: ICD-10-PCS | Mod: CPTII,S$GLB,, | Performed by: ORTHOPAEDIC SURGERY

## 2023-04-25 PROCEDURE — 1101F PT FALLS ASSESS-DOCD LE1/YR: CPT | Mod: CPTII,S$GLB,, | Performed by: ORTHOPAEDIC SURGERY

## 2023-04-25 NOTE — PROGRESS NOTES
CC: Right shoulder recent episode of transient soreness    DATE OF PROCEDURE: 12/7/2022    PROCEDURES PERFORMED:   Right reverse shoulder arthroplasty(CPT 41802)  Right shoulder open biceps tenodesis (CPT 60878)    She reports to be doing well 4.5 mos s/p the above-mentioned procedure. Denies any pain in the shoulder. States shoulder feels much better compared to preop.  Overall, patient has been doing very well following surgery. She does have some occasional shoulder and bicep soreness on the right side - one recent self limited episode.     PAST MEDICAL HISTORY:   Past Medical History:   Diagnosis Date    Acid reflux     Detached retina, left     Hypertension      PAST SURGICAL HISTORY:  Past Surgical History:   Procedure Laterality Date    ARTHROPLASTY Right 12/7/2022    Procedure: ARTHROPLASTY shoulder reverse;  Surgeon: GAEL Woodson MD;  Location: [facility];  Service: Orthopedics;  Laterality: Right;  interscalene  regional w/o catheter    mass removed Left     shoulder (benign)    PARTIAL HYSTERECTOMY      RETINAL DETACHMENT SURGERY Left      FAMILY HISTORY:  No family history on file.    MEDICATIONS:    Current Outpatient Medications:     amLODIPine (NORVASC) 10 MG tablet, Take 10 mg by mouth nightly., Disp: , Rfl: 1    ascorbic acid, vitamin C, (VITAMIN C) 500 MG tablet, Take 500 mg by mouth once daily., Disp: , Rfl:     aspirin (ECOTRIN) 81 MG EC tablet, Take 1 tablet twice a day with food starting after surgery (breakfast and dinner)., Disp: 28 tablet, Rfl: 0    benzonatate (TESSALON PERLES) 100 MG capsule, Take 1 capsule (100 mg total) by mouth 3 (three) times daily as needed for Cough., Disp: 30 capsule, Rfl: 1    carboxymethylcellulose (REFRESH PLUS) 0.5 % Dpet, Place 1 drop into both eyes 2 (two) times daily as needed., Disp: , Rfl:     celecoxib (CELEBREX) 200 MG capsule, Take 1 capsule (200 mg total) by mouth once daily. (Patient not taking: Reported on 3/9/2023), Disp: 30 capsule, Rfl: 0     cholecalciferol, vitamin D3, (VITAMIN D3 ORAL), Take 5,000 Units by mouth once daily., Disp: , Rfl:     cranberry conc/ascorbic acid (CRANBERRY PLUS VITAMIN C ORAL), Take 500 mg by mouth once daily., Disp: , Rfl:     cyanocobalamin (VITAMIN B-12) 1000 MCG tablet, Take 100 mcg by mouth once daily., Disp: , Rfl:     diclofenac sodium (VOLTAREN) 1 % Gel, APPLY 2 GRAMS EXTERNALLY TO THE AFFECTED AREA FOUR TIMES DAILY AS NEEDED, Disp: 100 g, Rfl: 4    dorzolamide-timolol 2-0.5% (COSOPT) 22.3-6.8 mg/mL ophthalmic solution, INSTILL 1 DROP INTO LEFT EYE TWICE DAILY, Disp: , Rfl:     esomeprazole (NEXIUM) 40 MG capsule, , Disp: , Rfl:     fluticasone propionate (FLONASE) 50 mcg/actuation nasal spray, 1 spray (50 mcg total) by Each Nostril route once daily., Disp: 11.1 mL, Rfl: 0    folic acid (FOLVITE) 800 MCG Tab, Take 800 mcg by mouth once daily., Disp: , Rfl:     gabapentin (NEURONTIN) 300 MG capsule, Take 300 mg by mouth nightly as needed., Disp: , Rfl: 0    HYDROcodone-acetaminophen (NORCO)  mg per tablet, Take 1 tablet by mouth every 12 (twelve) hours as needed for Pain. (Patient not taking: Reported on 3/9/2023), Disp: 14 tablet, Rfl: 0    montelukast (SINGULAIR) 10 mg tablet, Take 10 mg by mouth daily as needed., Disp: , Rfl:     ondansetron (ZOFRAN) 4 MG tablet, Take 1 tablet (4 mg total) by mouth every 8 (eight) hours as needed for Nausea., Disp: 30 tablet, Rfl: 0    vitamin E 400 UNIT capsule, Take 400 Units by mouth once daily., Disp: , Rfl:     ALLERGIES:  Review of patient's allergies indicates:   Allergen Reactions    Darvocet a500 [propoxyphene n-acetaminophen] Hallucinations    Percocet [oxycodone-acetaminophen] Hallucinations    Percodan [oxycodone-aspirin] Hallucinations    Tramadol Other (See Comments)     Insomnia     REVIEW OF SYSTEMS:  Constitution: Negative. Negative for chills, fever and night sweats.    Hematologic/Lymphatic: Negative for bleeding problem. Does not bruise/bleed easily.   Skin:  Negative for dry skin, itching and rash.   Musculoskeletal: Negative for falls. Neg for right shoulder pain and muscle weakness.     All other review of symptoms were reviewed and found to be noncontributory.     PHYSICAL EXAMINATION:  Vitals:  There were no vitals taken for this visit.   General: Well-developed well-nourished 77 y.o. femalein no acute distress   Cardiovascular: Regular rhythm by palpation of distal pulse, normal color and temperature, no concerning varicosities on symptomatic side   Lungs: No labored breathing or wheezing appreciated   Neuro: Alert and oriented ×3   Psychiatric: well oriented to person, place and time, demonstrates normal mood and affect   Skin: No rashes, lesions or ulcers, normal temperature, turgor, and texture on uninvolved extremity    Ortho/SPM Exam  Exam of the right shoulder demonstrates a well-healed incision.  No signs of infection.  Sensation intact to light touch over the axillary nerve distribution.  Active forward elevation in the scapular plane to 130 with ease.  Able to easily get hand to the top and back of her head.  No pain.  Passive forward elevation to 150 degrees.  Passive external rotation to 55 degrees. Active external rotation with arm at side to 40. IR to back pocket. No scapular spine pain or acromial pain    IMAGING:  Xrays including AP, Outlet and Axillary Lateral of RIGHT shoulder are ordered / images reviewed by me:   Stable reverse shoulder prosthesis with no fracture seen.  No signs of loosening of the prosthesis.    ASSESSMENT:      ICD-10-CM ICD-9-CM   1. Shoulder weakness  R29.898 719.61     PLAN:     -Doing quite well. Reassured that she is doing as expected. Her strength will continue to improve as her deltoid gets stronger. lateral shoulder soreness as her deltoid is under more tension due to the design of the prosthesis  -She can RTC in 2 months for routine check. Repeat xrays.    Procedures

## 2023-06-20 ENCOUNTER — HOSPITAL ENCOUNTER (OUTPATIENT)
Dept: RADIOLOGY | Facility: HOSPITAL | Age: 78
Discharge: HOME | End: 2023-06-20
Attending: ORTHOPAEDIC SURGERY
Payer: MEDICARE

## 2023-06-20 ENCOUNTER — OFFICE VISIT (OUTPATIENT)
Dept: SPORTS MEDICINE | Facility: CLINIC | Age: 78
End: 2023-06-20
Payer: MEDICARE

## 2023-06-20 VITALS
WEIGHT: 157 LBS | HEART RATE: 75 BPM | HEIGHT: 66 IN | BODY MASS INDEX: 25.23 KG/M2 | DIASTOLIC BLOOD PRESSURE: 75 MMHG | SYSTOLIC BLOOD PRESSURE: 115 MMHG

## 2023-06-20 DIAGNOSIS — M25.511 RIGHT SHOULDER PAIN, UNSPECIFIED CHRONICITY: ICD-10-CM

## 2023-06-20 DIAGNOSIS — Z96.611 S/P REVERSE TOTAL SHOULDER ARTHROPLASTY, RIGHT: ICD-10-CM

## 2023-06-20 DIAGNOSIS — R29.898 SHOULDER WEAKNESS: Primary | ICD-10-CM

## 2023-06-20 PROCEDURE — 99999 PR PBB SHADOW E&M-EST. PATIENT-LVL IV: CPT | Mod: PBBFAC,,, | Performed by: ORTHOPAEDIC SURGERY

## 2023-06-20 PROCEDURE — 1159F MED LIST DOCD IN RCRD: CPT | Mod: CPTII,S$GLB,, | Performed by: ORTHOPAEDIC SURGERY

## 2023-06-20 PROCEDURE — 1101F PT FALLS ASSESS-DOCD LE1/YR: CPT | Mod: CPTII,S$GLB,, | Performed by: ORTHOPAEDIC SURGERY

## 2023-06-20 PROCEDURE — 73030 X-RAY EXAM OF SHOULDER: CPT | Mod: 26,RT,, | Performed by: RADIOLOGY

## 2023-06-20 PROCEDURE — 99214 PR OFFICE/OUTPT VISIT, EST, LEVL IV, 30-39 MIN: ICD-10-PCS | Mod: S$GLB,,, | Performed by: ORTHOPAEDIC SURGERY

## 2023-06-20 PROCEDURE — 73030 X-RAY EXAM OF SHOULDER: CPT | Mod: TC,RT

## 2023-06-20 PROCEDURE — 99999 PR PBB SHADOW E&M-EST. PATIENT-LVL IV: ICD-10-PCS | Mod: PBBFAC,,, | Performed by: ORTHOPAEDIC SURGERY

## 2023-06-20 PROCEDURE — 73030 XR SHOULDER COMPLETE 2 OR MORE VIEWS RIGHT: ICD-10-PCS | Mod: 26,RT,, | Performed by: RADIOLOGY

## 2023-06-20 PROCEDURE — 1101F PR PT FALLS ASSESS DOC 0-1 FALLS W/OUT INJ PAST YR: ICD-10-PCS | Mod: CPTII,S$GLB,, | Performed by: ORTHOPAEDIC SURGERY

## 2023-06-20 PROCEDURE — 3078F DIAST BP <80 MM HG: CPT | Mod: CPTII,S$GLB,, | Performed by: ORTHOPAEDIC SURGERY

## 2023-06-20 PROCEDURE — 3074F SYST BP LT 130 MM HG: CPT | Mod: CPTII,S$GLB,, | Performed by: ORTHOPAEDIC SURGERY

## 2023-06-20 PROCEDURE — 3074F PR MOST RECENT SYSTOLIC BLOOD PRESSURE < 130 MM HG: ICD-10-PCS | Mod: CPTII,S$GLB,, | Performed by: ORTHOPAEDIC SURGERY

## 2023-06-20 PROCEDURE — 1126F PR PAIN SEVERITY QUANTIFIED, NO PAIN PRESENT: ICD-10-PCS | Mod: CPTII,S$GLB,, | Performed by: ORTHOPAEDIC SURGERY

## 2023-06-20 PROCEDURE — 1126F AMNT PAIN NOTED NONE PRSNT: CPT | Mod: CPTII,S$GLB,, | Performed by: ORTHOPAEDIC SURGERY

## 2023-06-20 PROCEDURE — 99214 OFFICE O/P EST MOD 30 MIN: CPT | Mod: S$GLB,,, | Performed by: ORTHOPAEDIC SURGERY

## 2023-06-20 PROCEDURE — 3288F FALL RISK ASSESSMENT DOCD: CPT | Mod: CPTII,S$GLB,, | Performed by: ORTHOPAEDIC SURGERY

## 2023-06-20 PROCEDURE — 1159F PR MEDICATION LIST DOCUMENTED IN MEDICAL RECORD: ICD-10-PCS | Mod: CPTII,S$GLB,, | Performed by: ORTHOPAEDIC SURGERY

## 2023-06-20 PROCEDURE — 3288F PR FALLS RISK ASSESSMENT DOCUMENTED: ICD-10-PCS | Mod: CPTII,S$GLB,, | Performed by: ORTHOPAEDIC SURGERY

## 2023-06-20 PROCEDURE — 3078F PR MOST RECENT DIASTOLIC BLOOD PRESSURE < 80 MM HG: ICD-10-PCS | Mod: CPTII,S$GLB,, | Performed by: ORTHOPAEDIC SURGERY

## 2023-06-20 NOTE — PROGRESS NOTES
CC: Right shoulder f/u    DATE OF PROCEDURE: 12/7/2022    PROCEDURES PERFORMED:   Right reverse shoulder arthroplasty(CPT 81237)  Right shoulder open biceps tenodesis (CPT 28367)    She reports to be doing well 6 mos s/p the above-mentioned procedure.  She reports her shoulder has no pain which is significantly improved from severe pain she had preop.  She is very pleased with the results of her surgery.  She does report some mild subjective decreased shoulder strength compared to preop.    PAST MEDICAL HISTORY:   Past Medical History:   Diagnosis Date    Acid reflux     Detached retina, left     Hypertension      PAST SURGICAL HISTORY:  Past Surgical History:   Procedure Laterality Date    ARTHROPLASTY Right 12/7/2022    Procedure: ARTHROPLASTY shoulder reverse;  Surgeon: GAEL Woodson MD;  Location: [facility];  Service: Orthopedics;  Laterality: Right;  interscalene  regional w/o catheter    mass removed Left     shoulder (benign)    PARTIAL HYSTERECTOMY      RETINAL DETACHMENT SURGERY Left      FAMILY HISTORY:  History reviewed. No pertinent family history.    MEDICATIONS:    Current Outpatient Medications:     amLODIPine (NORVASC) 10 MG tablet, Take 10 mg by mouth nightly., Disp: , Rfl: 1    ascorbic acid, vitamin C, (VITAMIN C) 500 MG tablet, Take 500 mg by mouth once daily., Disp: , Rfl:     carboxymethylcellulose (REFRESH PLUS) 0.5 % Dpet, Place 1 drop into both eyes 2 (two) times daily as needed., Disp: , Rfl:     cholecalciferol, vitamin D3, (VITAMIN D3 ORAL), Take 5,000 Units by mouth once daily., Disp: , Rfl:     cranberry conc/ascorbic acid (CRANBERRY PLUS VITAMIN C ORAL), Take 500 mg by mouth once daily., Disp: , Rfl:     cyanocobalamin (VITAMIN B-12) 1000 MCG tablet, Take 100 mcg by mouth once daily., Disp: , Rfl:     dorzolamide-timolol 2-0.5% (COSOPT) 22.3-6.8 mg/mL ophthalmic solution, INSTILL 1 DROP INTO LEFT EYE TWICE DAILY, Disp: , Rfl:     esomeprazole (NEXIUM) 40 MG capsule, , Disp: , Rfl:      fluticasone propionate (FLONASE) 50 mcg/actuation nasal spray, 1 spray (50 mcg total) by Each Nostril route once daily., Disp: 11.1 mL, Rfl: 0    folic acid (FOLVITE) 800 MCG Tab, Take 800 mcg by mouth once daily., Disp: , Rfl:     gabapentin (NEURONTIN) 300 MG capsule, Take 300 mg by mouth nightly as needed., Disp: , Rfl: 0    montelukast (SINGULAIR) 10 mg tablet, Take 10 mg by mouth daily as needed., Disp: , Rfl:     vitamin E 400 UNIT capsule, Take 400 Units by mouth once daily., Disp: , Rfl:     aspirin (ECOTRIN) 81 MG EC tablet, Take 1 tablet twice a day with food starting after surgery (breakfast and dinner). (Patient not taking: Reported on 4/25/2023), Disp: 28 tablet, Rfl: 0    benzonatate (TESSALON PERLES) 100 MG capsule, Take 1 capsule (100 mg total) by mouth 3 (three) times daily as needed for Cough. (Patient not taking: Reported on 4/25/2023), Disp: 30 capsule, Rfl: 1    celecoxib (CELEBREX) 200 MG capsule, Take 1 capsule (200 mg total) by mouth once daily. (Patient not taking: Reported on 6/20/2023), Disp: 30 capsule, Rfl: 0    diclofenac sodium (VOLTAREN) 1 % Gel, APPLY 2 GRAMS EXTERNALLY TO THE AFFECTED AREA FOUR TIMES DAILY AS NEEDED (Patient not taking: Reported on 4/25/2023), Disp: 100 g, Rfl: 4    HYDROcodone-acetaminophen (NORCO)  mg per tablet, Take 1 tablet by mouth every 12 (twelve) hours as needed for Pain. (Patient not taking: Reported on 3/9/2023), Disp: 14 tablet, Rfl: 0    ondansetron (ZOFRAN) 4 MG tablet, Take 1 tablet (4 mg total) by mouth every 8 (eight) hours as needed for Nausea. (Patient not taking: Reported on 4/25/2023), Disp: 30 tablet, Rfl: 0    ALLERGIES:  Review of patient's allergies indicates:   Allergen Reactions    Darvocet a500 [propoxyphene n-acetaminophen] Hallucinations    Percocet [oxycodone-acetaminophen] Hallucinations    Percodan [oxycodone-aspirin] Hallucinations    Tramadol Other (See Comments)     Insomnia     REVIEW OF SYSTEMS:  Constitution: Negative.  "Negative for chills, fever and night sweats.    Hematologic/Lymphatic: Negative for bleeding problem. Does not bruise/bleed easily.   Skin: Negative for dry skin, itching and rash.   Musculoskeletal: Negative for falls. Neg for right shoulder pain and muscle weakness.     All other review of symptoms were reviewed and found to be noncontributory.     PHYSICAL EXAMINATION:  Vitals:  /75   Pulse 75   Ht 5' 6" (1.676 m)   Wt 71.2 kg (157 lb)   BMI 25.34 kg/m²    General: Well-developed well-nourished 77 y.o. femalein no acute distress   Cardiovascular: Regular rhythm by palpation of distal pulse, normal color and temperature, no concerning varicosities on symptomatic side   Lungs: No labored breathing or wheezing appreciated   Neuro: Alert and oriented ×3   Psychiatric: well oriented to person, place and time, demonstrates normal mood and affect   Skin: No rashes, lesions or ulcers, normal temperature, turgor, and texture on uninvolved extremity    Ortho/SPM Exam  Exam of the right shoulder again demonstrates a well-healed incision.  No signs of infection.  Sensation intact to light touch over the axillary nerve distribution.  Active forward elevation in the scapular plane to 140 with ease.  Able to easily get hand to the top and back of her head.  No pain.  Passive forward elevation to 150 degrees.  Passive external rotation to 55 degrees. Active external rotation with arm at side to 40. IR to back pocket.  4 out of 5 resisted scaption compared to the other side, which is 5/5.  No scapular spine pain or acromial pain    IMAGING:  Xrays including AP, Outlet and Axillary Lateral of RIGHT shoulder are ordered / images reviewed by me:   Stable reverse shoulder prosthesis with no fracture seen.  No signs of loosening of the prosthesis.  Excellent positioning of components.    ASSESSMENT:      ICD-10-CM ICD-9-CM   1. Shoulder weakness  R29.898 719.61   2. S/P reverse total shoulder arthroplasty, right  Z96.611 " V43.61     PLAN:     Patient has done very well following her right reverse total shoulder arthroplasty with no pain and good range of motion on exam today.  Encouraged patient that she will continue to improve as she does more and strengthens her deltoid. Follow-up p.r.n..    Procedures

## 2023-11-14 ENCOUNTER — OFFICE VISIT (OUTPATIENT)
Dept: PODIATRY | Facility: CLINIC | Age: 78
End: 2023-11-14
Payer: MEDICARE

## 2023-11-14 VITALS
WEIGHT: 156.94 LBS | HEIGHT: 66 IN | DIASTOLIC BLOOD PRESSURE: 82 MMHG | SYSTOLIC BLOOD PRESSURE: 120 MMHG | HEART RATE: 77 BPM | BODY MASS INDEX: 25.22 KG/M2

## 2023-11-14 DIAGNOSIS — M62.461 GASTROCNEMIUS EQUINUS OF RIGHT LOWER EXTREMITY: Primary | ICD-10-CM

## 2023-11-14 PROCEDURE — 99203 OFFICE O/P NEW LOW 30 MIN: CPT | Mod: S$GLB,,, | Performed by: PODIATRIST

## 2023-11-14 PROCEDURE — 3074F PR MOST RECENT SYSTOLIC BLOOD PRESSURE < 130 MM HG: ICD-10-PCS | Mod: CPTII,S$GLB,, | Performed by: PODIATRIST

## 2023-11-14 PROCEDURE — 99999 PR PBB SHADOW E&M-EST. PATIENT-LVL II: CPT | Mod: PBBFAC,,, | Performed by: PODIATRIST

## 2023-11-14 PROCEDURE — 3079F PR MOST RECENT DIASTOLIC BLOOD PRESSURE 80-89 MM HG: ICD-10-PCS | Mod: CPTII,S$GLB,, | Performed by: PODIATRIST

## 2023-11-14 PROCEDURE — 3079F DIAST BP 80-89 MM HG: CPT | Mod: CPTII,S$GLB,, | Performed by: PODIATRIST

## 2023-11-14 PROCEDURE — 99203 PR OFFICE/OUTPT VISIT, NEW, LEVL III, 30-44 MIN: ICD-10-PCS | Mod: S$GLB,,, | Performed by: PODIATRIST

## 2023-11-14 PROCEDURE — 1126F AMNT PAIN NOTED NONE PRSNT: CPT | Mod: CPTII,S$GLB,, | Performed by: PODIATRIST

## 2023-11-14 PROCEDURE — 1126F PR PAIN SEVERITY QUANTIFIED, NO PAIN PRESENT: ICD-10-PCS | Mod: CPTII,S$GLB,, | Performed by: PODIATRIST

## 2023-11-14 PROCEDURE — 3074F SYST BP LT 130 MM HG: CPT | Mod: CPTII,S$GLB,, | Performed by: PODIATRIST

## 2023-11-14 PROCEDURE — 99999 PR PBB SHADOW E&M-EST. PATIENT-LVL II: ICD-10-PCS | Mod: PBBFAC,,, | Performed by: PODIATRIST

## 2023-11-14 NOTE — PROGRESS NOTES
Subjective:      Patient ID: Elena Willingham is a 78 y.o. female.    Chief Complaint: Ankle Pain (Right ankle pain. Mostly when resting.)    Pt presents today c/o pain in right ankle heel around the achilles tendon area. Pt denies any trauma. Pt states the pain is worse when standing/weight bearing. Pt rates pain 5/10 Treatment tried none. Pt has x-rays which showed enthesopathy of AT.       Review of Systems   Constitutional: Negative for chills, fever and malaise/fatigue.   HENT:  Negative for hearing loss.    Cardiovascular:  Negative for claudication.   Respiratory:  Negative for shortness of breath.    Skin:  Negative for flushing and rash.   Musculoskeletal:  Negative for joint pain and myalgias.   Neurological:  Negative for loss of balance, numbness, paresthesias and sensory change.   Psychiatric/Behavioral:  Negative for altered mental status.            Objective:      Physical Exam  Vitals reviewed.   Cardiovascular:      Pulses:           Dorsalis pedis pulses are 2+ on the right side and 2+ on the left side.        Posterior tibial pulses are 2+ on the right side and 2+ on the left side.      Comments: No edema noted b/L  Musculoskeletal:         General: Tenderness present.      Comments:        Feet:      Right foot:      Protective Sensation: 5 sites tested.  5 sites sensed.      Left foot:      Protective Sensation: 5 sites tested.  5 sites sensed.   Skin:     Capillary Refill: Capillary refill takes 2 to 3 seconds.      Comments: Normal skin tugor noted.   No open lesion noted b/L  Skin temp is warm to warm from proximal to distal b/L.  Webspaces clean, dry, and intact     Neurological:      Mental Status: She is alert and oriented to person, place, and time.      Comments: Gross sensation intact b/L       Decreased right ankle joint ROM, pain with palpation of posterior 1/3 calcaneus at region of Achilles tendon insertion. moderat pain with ankle joint ROM   No palpable dell noted, pain noted  at AT, muscle junction          Assessment:       Encounter Diagnosis   Name Primary?    Gastrocnemius equinus of right lower extremity Yes         Plan:       Elena was seen today for ankle pain.    Diagnoses and all orders for this visit:    Gastrocnemius equinus of right lower extremity  -     HME - OTHER      I counseled the patient on her conditions, their implications and medical management.    Pt was advised on the etiologies and issues associated with achilles tendinitis.  Night splint dispensed with instructions on use    Pt instructed to purchase OTC Voltaren gel 1%, use on affected area. Pt advised discontinue usage if any adverse effects should occur.       RTC in 6 weeks or sooner if symptoms don't improve or persists      .

## 2024-06-05 ENCOUNTER — TELEPHONE (OUTPATIENT)
Dept: DERMATOLOGY | Facility: CLINIC | Age: 79
End: 2024-06-05
Payer: MEDICARE

## 2024-06-05 NOTE — TELEPHONE ENCOUNTER
Scheduled pt to come in tomorrow @ 815am        Cristy       ----- Message from Sari Krishnamurthy sent at 6/5/2024  8:50 AM CDT -----  Regarding: [facility] APPOINTMENT  Contact: [phone]  Calling to schedule an appointment TODAY for itching under neck and chest as soon as possible. Please call patient to schedule today.

## 2024-06-06 ENCOUNTER — OFFICE VISIT (OUTPATIENT)
Dept: DERMATOLOGY | Facility: CLINIC | Age: 79
End: 2024-06-06
Payer: MEDICARE

## 2024-06-06 DIAGNOSIS — Z76.89 ENCOUNTER FOR SKIN CARE: Primary | ICD-10-CM

## 2024-06-06 DIAGNOSIS — L25.9 CONTACT DERMATITIS, UNSPECIFIED CONTACT DERMATITIS TYPE, UNSPECIFIED TRIGGER: ICD-10-CM

## 2024-06-06 DIAGNOSIS — L29.9 ITCH: ICD-10-CM

## 2024-06-06 DIAGNOSIS — L82.1 SK (SEBORRHEIC KERATOSIS): ICD-10-CM

## 2024-06-06 PROCEDURE — 1101F PT FALLS ASSESS-DOCD LE1/YR: CPT | Mod: CPTII,S$GLB,, | Performed by: DERMATOLOGY

## 2024-06-06 PROCEDURE — G2211 COMPLEX E/M VISIT ADD ON: HCPCS | Mod: S$GLB,,, | Performed by: DERMATOLOGY

## 2024-06-06 PROCEDURE — 99999 PR PBB SHADOW E&M-EST. PATIENT-LVL III: CPT | Mod: PBBFAC,,, | Performed by: DERMATOLOGY

## 2024-06-06 PROCEDURE — 3288F FALL RISK ASSESSMENT DOCD: CPT | Mod: CPTII,S$GLB,, | Performed by: DERMATOLOGY

## 2024-06-06 PROCEDURE — 1160F RVW MEDS BY RX/DR IN RCRD: CPT | Mod: CPTII,S$GLB,, | Performed by: DERMATOLOGY

## 2024-06-06 PROCEDURE — 1159F MED LIST DOCD IN RCRD: CPT | Mod: CPTII,S$GLB,, | Performed by: DERMATOLOGY

## 2024-06-06 PROCEDURE — 99214 OFFICE O/P EST MOD 30 MIN: CPT | Mod: S$GLB,,, | Performed by: DERMATOLOGY

## 2024-06-06 PROCEDURE — 1126F AMNT PAIN NOTED NONE PRSNT: CPT | Mod: CPTII,S$GLB,, | Performed by: DERMATOLOGY

## 2024-06-06 RX ORDER — FLUOCINONIDE 0.5 MG/G
CREAM TOPICAL 2 TIMES DAILY
Qty: 45 G | Refills: 0 | Status: SHIPPED | OUTPATIENT
Start: 2024-06-06

## 2024-06-06 NOTE — PATIENT INSTRUCTIONS
Good skin care regimen discussed including limiting to one bath or shower per day, using lukewarm water with mild soap and moisturization to skin once to twice daily.  Consider glycerin bar soap or Dove.  Consider organic coconut oil.

## 2024-06-06 NOTE — PROGRESS NOTES
Subjective:      Patient ID:  Elena Willingham is a 78 y.o. female who presents for   Chief Complaint   Patient presents with    Itching     Uppper chest and neck      Itching - Initial  Affected locations: Upper chest and neck.  Duration: 1 week  Signs / symptoms: itching  Severity: mild to moderate  Treatments tried: Benadryl cream and ice pack.  Improvement on treatment: mild      Review of Systems   Skin:  Positive for itching, activity-related sunscreen use and wears hat (rarely). Negative for rash, daily sunscreen use and recent sunburn.   Hematologic/Lymphatic: Bruises/bleeds easily.       Objective:   Physical Exam   Constitutional: She appears well-developed and well-nourished. No distress.   Neurological: She is alert and oriented to person, place, and time. She is not disoriented.   Psychiatric: She has a normal mood and affect.   Skin:                    Diagram Legend     Erythematous scaling macule/papule c/w actinic keratosis       Vascular papule c/w angioma      Pigmented verrucoid papule/plaque c/w seborrheic keratosis      Yellow umbilicated papule c/w sebaceous hyperplasia      Irregularly shaped tan macule c/w lentigo     1-2 mm smooth white papules consistent with Milia      Movable subcutaneous cyst with punctum c/w epidermal inclusion cyst      Subcutaneous movable cyst c/w pilar cyst      Firm pink to brown papule c/w dermatofibroma      Pedunculated fleshy papule(s) c/w skin tag(s)      Evenly pigmented macule c/w junctional nevus     Mildly variegated pigmented, slightly irregular-bordered macule c/w mildly atypical nevus      Flesh colored to evenly pigmented papule c/w intradermal nevus       Pink pearly papule/plaque c/w basal cell carcinoma      Erythematous hyperkeratotic cursted plaque c/w SCC      Surgical scar with no sign of skin cancer recurrence      Open and closed comedones      Inflammatory papules and pustules      Verrucoid papule consistent consistent with wart      Erythematous eczematous patches and plaques     Dystrophic onycholytic nail with subungual debris c/w onychomycosis     Umbilicated papule    Erythematous-base heme-crusted tan verrucoid plaque consistent with inflamed seborrheic keratosis     Erythematous Silvery Scaling Plaque c/w Psoriasis     See annotation      Assessment / Plan:        Encounter for skin care  No hot water bathing reviewed.  Patient instructed in importance in daily sun protection. Sun avoidance and topical protection discussed.     Patient encouraged to wear hat for all outdoor exposure.     Also discussed sun protective clothing.    Contact dermatitis, unspecified contact dermatitis type, unspecified trigger  -     fluocinonide 0.05% (LIDEX) 0.05 % cream; Apply topically 2 (two) times daily. Prn neck rash.Stop using steroid topical when skin is smooth and non itchy.  Do not treat dark or red coloring.  Dispense: 45 g; Refill: 0  This is suspected.  Reviewed with patient different treatment options and associated risks.  Proper application of medications and or care for affected area(s) and condition(s) reviewed.  Patient to watch for recurrence or flares or worsening and to call the clinic for a follow up appointment for such.  Pt to avoid necklaces.  Could be metal allergy vs  used on jewelry.  Also watch for photoderm but less likely.    SK (seborrheic keratosis)  Discussed with patient the benign nature of these lesions and that no treatment is indicated.  Chronic nature of this condition discussed with patient.    Itch  -     fluocinonide 0.05% (LIDEX) 0.05 % cream; Apply topically 2 (two) times daily. Prn neck rash.Stop using steroid topical when skin is smooth and non itchy.  Do not treat dark or red coloring.  Dispense: 45 g; Refill: 0  Reviewed with patient different treatment options and associated risks.  Proper application of medications and or care for affected area(s) and condition(s) reviewed.             Follow up if  symptoms worsen or fail to improve.

## 2025-01-27 ENCOUNTER — TELEPHONE (OUTPATIENT)
Dept: SPORTS MEDICINE | Facility: CLINIC | Age: 80
End: 2025-01-27
Payer: MEDICARE

## 2025-01-27 NOTE — TELEPHONE ENCOUNTER
----- Message from [employer] sent at 1/24/2025  1:21 PM CST -----  Contact: FELIPE CRUZ [[id number]]  ..Type:  Patient Requesting Call    Who Called:FELIPE CRUZ [[id number]]  Would the patient rather a call back or a response via [facility]? Call  Best Call Back Number:.[phone] (home)   Additional Information: Patient called to discuss antibiotics for her teeth cleaning

## 2025-01-27 NOTE — TELEPHONE ENCOUNTER
----- Message from [employer] sent at 1/24/2025  1:21 PM CST -----  Contact: FELIPE CRUZ [[id number]]  ..Type:  Patient Requesting Call    Who Called:FELIPE CRUZ [[id number]]  Would the patient rather a call back or a response via [facility]? Call  Best Call Back Number:.[phone] (home)   Additional Information: Patient called to discuss antibiotics for her teeth cleaning

## (undated) DEVICE — SYR 30CC LUER LOCK

## (undated) DEVICE — APPLICATOR CHLORAPREP ORN 26ML

## (undated) DEVICE — DRESSING AQUACEL AG ADV 3.5X12

## (undated) DEVICE — SUT VICRYL PLUS 0 CT1 36IN

## (undated) DEVICE — BIT DRILL PERIPH SCREW 3.2MM

## (undated) DEVICE — SUPPORT SLING SHOT II MEDIUM

## (undated) DEVICE — SYS CLSR DERMABOND PRINEO 22CM

## (undated) DEVICE — SUT 3-0 MONOCRYL PLUS PS-2

## (undated) DEVICE — Device

## (undated) DEVICE — DRAPE INCISE IOBAN 2 23X33IN

## (undated) DEVICE — WRAP SHLDR HIP ACCU THRM PACK

## (undated) DEVICE — MASK FLYTE HOOD PEEL AWAY

## (undated) DEVICE — SKIN MARKER DEVON 160

## (undated) DEVICE — GOWN ECLIPSE REINF LV4 XLNG XL

## (undated) DEVICE — DRAPE STERI-DRAPE 1000 17X11IN

## (undated) DEVICE — YANKAUER OPEN TIP W/O VENT

## (undated) DEVICE — DRAPE STERI INSTRUMENT 1018

## (undated) DEVICE — GLOVE BIOGEL SKINSENSE PI 8.0

## (undated) DEVICE — SUT VICRYL 3-0 27 SH

## (undated) DEVICE — DRESSING AQUACEL AG 3.5X10IN

## (undated) DEVICE — SET DECANTER MEDICHOICE

## (undated) DEVICE — KIT IRR SUCTION HND PIECE

## (undated) DEVICE — TAPE SURG DURAPORE 2 X10YD

## (undated) DEVICE — PAD DERMAPROX THCK 11X15X1IN

## (undated) DEVICE — SOL BETADINE 5%

## (undated) DEVICE — BNDG COFLEX FOAM LF2 ST 6X5YD

## (undated) DEVICE — DRAPE U SPLIT SHEET 54X76IN

## (undated) DEVICE — UNDERGLOVES BIOGEL PI SIZE 8.5

## (undated) DEVICE — SUT 38 FIBERWIRE #2

## (undated) DEVICE — PIN GUIDE AEQUALIS 2.5X220MM
Type: IMPLANTABLE DEVICE | Site: SHOULDER | Status: NON-FUNCTIONAL
Removed: 2022-12-07

## (undated) DEVICE — PIN GUIDE SIMPLICITI 3X75
Type: IMPLANTABLE DEVICE | Site: SHOULDER | Status: NON-FUNCTIONAL
Removed: 2022-12-07

## (undated) DEVICE — DRAPE THREE-QTR REINF 53X77IN

## (undated) DEVICE — DRAPE STERI U-SHAPED 47X51IN

## (undated) DEVICE — SEE MEDLINE ITEM 157216

## (undated) DEVICE — SPONGE LAP 18X18 PREWASHED

## (undated) DEVICE — ELECTRODE REM PLYHSV RETURN 9

## (undated) DEVICE — SUT 0 18IN SILK BLK BRAIDE

## (undated) DEVICE — SYR IRRIGATION BULB STER 60ML

## (undated) DEVICE — COVER PROXIMA MAYO STAND

## (undated) DEVICE — DRESSING AQUACEL AG ADV 3.5X6

## (undated) DEVICE — SOL NACL IRR 3000ML